# Patient Record
Sex: FEMALE | Race: WHITE | Employment: PART TIME | URBAN - METROPOLITAN AREA
[De-identification: names, ages, dates, MRNs, and addresses within clinical notes are randomized per-mention and may not be internally consistent; named-entity substitution may affect disease eponyms.]

---

## 2017-04-25 ENCOUNTER — TELEPHONE (OUTPATIENT)
Dept: PAIN MANAGEMENT | Age: 62
End: 2017-04-25

## 2017-05-24 ENCOUNTER — HOSPITAL ENCOUNTER (OUTPATIENT)
Dept: OTHER | Age: 62
Discharge: HOME | End: 2017-05-24
Attending: PHYSICAL MEDICINE & REHABILITATION | Admitting: PHYSICAL MEDICINE & REHABILITATION

## 2017-05-24 DIAGNOSIS — M47.816 SPONDYLOSIS WITHOUT MYELOPATHY OR RADICULOPATHY, LUMBAR REGION: ICD-10-CM

## 2017-07-03 ENCOUNTER — HOSPITAL ENCOUNTER (OUTPATIENT)
Dept: OTHER | Age: 62
Discharge: HOME | End: 2017-07-03
Attending: PHYSICAL MEDICINE & REHABILITATION | Admitting: PHYSICAL MEDICINE & REHABILITATION

## 2017-07-03 DIAGNOSIS — M25.561 PAIN IN BOTH KNEES, UNSPECIFIED CHRONICITY: ICD-10-CM

## 2017-07-03 DIAGNOSIS — M25.562 PAIN IN BOTH KNEES, UNSPECIFIED CHRONICITY: ICD-10-CM

## 2021-09-10 ENCOUNTER — OFFICE VISIT (OUTPATIENT)
Dept: ORTHOPEDIC SURGERY | Age: 66
End: 2021-09-10
Payer: MEDICARE

## 2021-09-10 VITALS — WEIGHT: 164 LBS | HEIGHT: 63 IN | BODY MASS INDEX: 29.06 KG/M2 | RESPIRATION RATE: 16 BRPM

## 2021-09-10 DIAGNOSIS — M67.88 RIGHT PERONEAL TENDONOSIS: Primary | ICD-10-CM

## 2021-09-10 DIAGNOSIS — M79.671 RIGHT FOOT PAIN: ICD-10-CM

## 2021-09-10 DIAGNOSIS — F17.200 CURRENT SMOKER: ICD-10-CM

## 2021-09-10 DIAGNOSIS — M25.571 RIGHT ANKLE PAIN, UNSPECIFIED CHRONICITY: ICD-10-CM

## 2021-09-10 PROCEDURE — L1902 AFO ANKLE GAUNTLET PRE OTS: HCPCS | Performed by: ORTHOPAEDIC SURGERY

## 2021-09-10 PROCEDURE — 99406 BEHAV CHNG SMOKING 3-10 MIN: CPT | Performed by: ORTHOPAEDIC SURGERY

## 2021-09-10 PROCEDURE — 99203 OFFICE O/P NEW LOW 30 MIN: CPT | Performed by: ORTHOPAEDIC SURGERY

## 2021-09-10 NOTE — PROGRESS NOTES
CHIEF COMPLAINT: Right posterior-lateral ankle pain/ peroneal tendenosis. HISTORY:  Ms. Nicki Matthews 60 y.o.  female presents today for consultation request from Chelle Jimenez MD for evaluation of right posterior-lateral ankle pain which started to worsen in June 2021 when she slipped and twisted her ankle getting into a truck. She had increased pain at the time, but it improved some. She then did yard work in August and her right foot and ankle started swelling with increased pain on the lateral ankle.  She is complaining of achy  pain. Pain is increase with standing and walking, and decrease with rest. Rates her pain a 3/10VAS, moderate achy burning and worsening. Pain is sharp with first few steps, dull achy pain by the end of the day. The pain radiates to lateral leg, and no numbness and tingling sensation. Treatment to date consisted of Medrol dose pack, Ibuprofen and Naproxen with some improvement. No other complaint. She is a smoker. She has a standing job and has not missed work, but is finding it hard to complete her day. She was told when she was in her 25s that she had a lesion in her tibia and it has been stable since. Denies tibial pain. Past Medical History:   Diagnosis Date    Arthritis     Cancer ([address]     Chronic back pain     Hyperlipidemia     Osteoarthritis        Past Surgical History:   Procedure Laterality Date    CERVIX SURGERY      HERNIA REPAIR      HYSTERECTOMY         Social History     Socioeconomic History    Marital status: Single     Spouse name: Not on file    Number of children: Not on file    Years of education: Not on file    Highest education level: Not on file   Occupational History    Occupation: cook   Tobacco Use    Smoking status: Light Tobacco Smoker     Types: Cigarettes    Smokeless tobacco: Never Used   Substance and Sexual Activity    Alcohol use:  Yes     Alcohol/week: 0.0 standard drinks    Drug use: No    Sexual activity: Never Other Topics Concern    Not on file   Social History Narrative    Not on file     Social Determinants of Health     Financial Resource Strain:     Difficulty of Paying Living Expenses:    Food Insecurity:     Worried About Running Out of Food in the Last Year:     [address] in the Last Year:    Transportation Needs:     Lack of Transportation (Medical):  Lack of Transportation (Non-Medical):    Physical Activity:     Days of Exercise per Week:     Minutes of Exercise per Session:    Stress:     Feeling of Stress :    Social Connections:     Frequency of Communication with Friends and Family:     Frequency of Social Gatherings with Friends and Family:     Attends Jain Services:     Active Member of Clubs or Organizations:     Attends Club or Organization Meetings:     Marital Status:    Intimate Partner Violence:     Fear of Current or Ex-Partner:     Emotionally Abused:     Physically Abused:     Sexually Abused:        Family History   Problem Relation Age of Onset    High Blood Pressure Mother     High Blood Pressure Father     High Blood Pressure Other        Current Outpatient Medications on File Prior to Visit   Medication Sig Dispense Refill    methylPREDNISolone (MEDROL, CARLOTTA,) 4 MG tablet Take by mouth. 1 kit 0    naproxen (NAPROSYN) 500 MG tablet Take 1 tablet by mouth 2 times daily (with meals) 60 tablet 3     No current facility-administered medications on file prior to visit. Pertinent items are noted in HPI  Review of systems reviewed from Patient History Form dated on 9/10/2021 and available in the patient's chart under the Media tab. No change noted. PHYSICAL EXAMINATION:  Ms. Dwayne Umaña is a very pleasant 60 y.o.  female who presents today in no acute distress, awake, alert, and oriented. She is well dressed, nourished and  groomed. Patient with normal affect.   Height is  5' 3\" (1.6 m), weight is 164 lb (74.4 kg), Body mass index is 29.05 kg/m². Resting respiratory rate is 16. Examination of the gait, showed that the patient walks heel-toe with a minimal limp.  Examination of both ankles showing dorsiflexion to about 10 degrees bilaterally, which increased with knee flexion. She has intact sensation and good pedal pulses.  She has weak eversion, and has moderate tenderness on deep palpation over the peroneal tendon with swelling compared to the other side.  The ankles are stable to drawer test bilaterally, equally.   Bilateral bunion. IMAGING: Mackenzie Cortes were reviewed, 3 views of the right ankle and foot taken in office today, and showed no acute fracture. There is a bony lesion in the left distal tibia. IMPRESSION: Right peroneal tendenosis. PLAN: I discussed with the patient all the treatment options, and natural history of peroneal tendenitis. We recommended stretching exercises of the calf which was taught to the patient today. She will take NSAIDS as needed, rx sent. I believe she will benefit from ankle brace, and that was applied in the office today and instructed her in care. We discussed the risk of progression. We will see her  back in 6 weeks and may consider PT if needed. The patient smokes, and we discussed with the patient the risks of smoking on general health and also on bone and soft tissue healing (delay and non-union), and promised to cut down or stop smoking. Smoking: Educated the patient regarding the hazards of smoking and that it harms their body in many ways. It increases the chance of developing heart disease, lung disease, cancer, and other health problems including poor bone and wound healing. The importance of smoking cessation for optimal bone and wound healing was stressed. This was communicated verbally, 5 Minutes. Procedures    Mark Albertsr Ankle Brace     Patient was prescribed a Swedo Ankle Brace.   The right ankle will require stabilization / immobilization from this semi-rigid / rigid orthosis to improve their function. The orthosis will assist in protecting the affected area, provide functional support and facilitate healing. Patient was instructed to progress ambulation weight bearing as tolerated in the device. The patient was educated and fit by a healthcare professional with expert knowledge and specialization in brace application while under the direct supervision of the treating physician. Verbal and written instructions for the use of and application of this item were provided. They were instructed to contact the office immediately should the brace result in increased pain, decreased sensation, increased swelling or worsening of the condition. Thank you very much for the kind consultation and allowing me to participate in this patient's care. I will continue to keep you apprised of her progress.         Fernando Deleon MD

## 2021-09-11 PROBLEM — M67.88: Status: ACTIVE | Noted: 2021-09-11

## 2021-09-11 PROBLEM — F17.200 CURRENT SMOKER: Status: ACTIVE | Noted: 2021-09-11

## 2021-09-17 ENCOUNTER — HOSPITAL ENCOUNTER (OUTPATIENT)
Dept: MRI IMAGING | Age: 66
Discharge: HOME | End: 2021-09-17
Payer: MEDICARE

## 2021-09-17 ENCOUNTER — HOSPITAL ENCOUNTER (OUTPATIENT)
Dept: WOMENS IMAGING | Age: 66
Discharge: HOME | End: 2021-09-17
Payer: MEDICARE

## 2021-09-17 DIAGNOSIS — M25.511 ACUTE PAIN OF RIGHT SHOULDER: ICD-10-CM

## 2021-09-17 DIAGNOSIS — Z12.31 ENCOUNTER FOR SCREENING MAMMOGRAM FOR BREAST CANCER: ICD-10-CM

## 2021-09-17 DIAGNOSIS — M25.571 ACUTE RIGHT ANKLE PAIN: ICD-10-CM

## 2021-09-17 DIAGNOSIS — Z12.31 OTHER SCREENING MAMMOGRAM: ICD-10-CM

## 2021-09-17 DIAGNOSIS — E78.00 HIGH CHOLESTEROL: ICD-10-CM

## 2021-09-17 PROCEDURE — 73221 MRI JOINT UPR EXTREM W/O DYE: CPT

## 2021-09-17 PROCEDURE — 77067 SCR MAMMO BI INCL CAD: CPT

## 2021-09-22 ENCOUNTER — OFFICE VISIT (OUTPATIENT)
Dept: ORTHOPEDIC SURGERY | Age: 66
End: 2021-09-22
Payer: MEDICARE

## 2021-09-22 VITALS — WEIGHT: 164 LBS | HEIGHT: 63 IN | BODY MASS INDEX: 29.06 KG/M2

## 2021-09-22 DIAGNOSIS — M25.511 ACUTE PAIN OF RIGHT SHOULDER: ICD-10-CM

## 2021-09-22 DIAGNOSIS — M19.211 SECONDARY OSTEOARTHRITIS OF RIGHT SHOULDER DUE TO ROTATOR CUFF TEAR: Primary | ICD-10-CM

## 2021-09-22 DIAGNOSIS — M75.101 SECONDARY OSTEOARTHRITIS OF RIGHT SHOULDER DUE TO ROTATOR CUFF TEAR: Primary | ICD-10-CM

## 2021-09-22 DIAGNOSIS — F17.200 CURRENT SMOKER: ICD-10-CM

## 2021-09-22 PROCEDURE — 99213 OFFICE O/P EST LOW 20 MIN: CPT | Performed by: ORTHOPAEDIC SURGERY

## 2021-09-22 PROCEDURE — 99406 BEHAV CHNG SMOKING 3-10 MIN: CPT | Performed by: ORTHOPAEDIC SURGERY

## 2021-09-22 PROCEDURE — G8419 CALC BMI OUT NRM PARAM NOF/U: HCPCS | Performed by: ORTHOPAEDIC SURGERY

## 2021-09-22 PROCEDURE — G8427 DOCREV CUR MEDS BY ELIG CLIN: HCPCS | Performed by: ORTHOPAEDIC SURGERY

## 2021-09-22 NOTE — PROGRESS NOTES
CHIEF COMPLAINT: Right shoulder pain/ cuff tendinopathy/ impingement syndrome. HISTORY:  Ms. Leola Krishna 60 y.o. female right handed presents today for consultation request from Joaquin Grider MD for evaluation of right shoulder pain which started June 2021 with no injuy. She is complaining of achy pain. Rates pain a 8/10 VAS. Pain is increase with elevation and decrease with rest. No radiation and no numbness and tingling sensation. No other complaint. No h/o trauma or gout. She has had no treatment for this problem. She works in a kitchen as a cook and . She is a smoker. She is known to me for right ankle peroneal tendinitis. Past Medical History:   Diagnosis Date    Arthritis     Cancer ([address]     Chronic back pain     Hyperlipidemia     Osteoarthritis        Past Surgical History:   Procedure Laterality Date    CERVIX SURGERY      HERNIA REPAIR      HYSTERECTOMY         Social History     Socioeconomic History    Marital status: Single     Spouse name: Not on file    Number of children: Not on file    Years of education: Not on file    Highest education level: Not on file   Occupational History    Occupation: cook   Tobacco Use    Smoking status: Light Tobacco Smoker     Types: Cigarettes    Smokeless tobacco: Never Used    Tobacco comment: Party smoker, once a week    Vaping Use    Vaping Use: Never used   Substance and Sexual Activity    Alcohol use:  Yes     Alcohol/week: 0.0 standard drinks     Comment: occansionally     Drug use: Yes     Comment: THC gummies to sleep at night not on a regular basis     Sexual activity: Never   Other Topics Concern    Not on file   Social History Narrative    Not on file     Social Determinants of Health     Financial Resource Strain:     Difficulty of Paying Living Expenses:    Food Insecurity:     Worried About Running Out of Food in the Last Year:     [address] in the Last Year:    Transportation Needs:     Lack of Transportation (Medical):  Lack of Transportation (Non-Medical):    Physical Activity:     Days of Exercise per Week:     Minutes of Exercise per Session:    Stress:     Feeling of Stress :    Social Connections:     Frequency of Communication with Friends and Family:     Frequency of Social Gatherings with Friends and Family:     Attends Temple Services:     Active Member of Clubs or Organizations:     Attends Club or Organization Meetings:     Marital Status:    Intimate Partner Violence:     Fear of Current or Ex-Partner:     Emotionally Abused:     Physically Abused:     Sexually Abused:        Family History   Problem Relation Age of Onset    High Blood Pressure Mother     High Blood Pressure Father     High Blood Pressure Other        Current Outpatient Medications on File Prior to Visit   Medication Sig Dispense Refill    methylPREDNISolone (MEDROL, CARLOTTA,) 4 MG tablet Take by mouth. (Patient not taking: Reported on 9/22/2021) 1 kit 0    naproxen (NAPROSYN) 500 MG tablet Take 1 tablet by mouth 2 times daily (with meals) (Patient not taking: Reported on 9/22/2021) 60 tablet 3     No current facility-administered medications on file prior to visit. Pertinent items are noted in HPI  Review of systems reviewed from Patient History Form dated on 9/10/2021 and available in the patient's chart under the Media tab. No change noted. PHYSICAL EXAMINATION:  Ms. Apolonia Gaytan is a very pleasant 60 y.o.  female who presents today in no acute distress, awake, alert, and oriented. She is well dressed, nourished and  groomed. Patient with normal affect. Height is  5' 3\" (1.6 m), weight is 164 lb (74.4 kg), Body mass index is 29.05 kg/m². Resting respiratory rate is 16. Examination of the gait, showed that the patient walks heel-toe with a non-antalgic gait and no limp. On evaluation of the right shoulder, range of motion is 130 degree in flexion and 120 degree in abduction.   There is positve Minutes. Thank you very much for the kind consultation and allowing me to participate in this patient's care. I will continue to keep you apprised of her progress.         Yenny Blum MD

## 2021-09-23 ENCOUNTER — TELEPHONE (OUTPATIENT)
Dept: ORTHOPEDIC SURGERY | Age: 66
End: 2021-09-23

## 2021-09-23 NOTE — TELEPHONE ENCOUNTER
Appointment Request     Patient requesting earlier appointment: Yes  Appointment offered to patient: Yes  Patient Contact Number: [phone]    Patient is requesting a sooner apt at the [facility]. She is being referred by Dr. Romina Marcial.

## 2021-09-25 PROBLEM — M75.101 SECONDARY OSTEOARTHRITIS OF RIGHT SHOULDER DUE TO ROTATOR CUFF TEAR: Status: ACTIVE | Noted: 2021-09-25

## 2021-09-25 PROBLEM — M19.211 SECONDARY OSTEOARTHRITIS OF RIGHT SHOULDER DUE TO ROTATOR CUFF TEAR: Status: ACTIVE | Noted: 2021-09-25

## 2021-10-07 ENCOUNTER — HOSPITAL ENCOUNTER (OUTPATIENT)
Dept: CT IMAGING | Age: 66
Discharge: HOME | End: 2021-10-07
Payer: MEDICARE

## 2021-10-07 DIAGNOSIS — D72.820 LYMPHOCYTOSIS: ICD-10-CM

## 2021-10-07 PROCEDURE — 74176 CT ABD & PELVIS W/O CONTRAST: CPT

## 2021-10-07 PROCEDURE — 71250 CT THORAX DX C-: CPT

## 2021-10-07 PROCEDURE — 6360000004 HC RX CONTRAST MEDICATION: Performed by: INTERNAL MEDICINE

## 2021-10-07 RX ADMIN — IOHEXOL 50 ML: 240 INJECTION, SOLUTION INTRATHECAL; INTRAVASCULAR; INTRAVENOUS; ORAL at 13:30

## 2021-10-21 ENCOUNTER — TELEPHONE (OUTPATIENT)
Dept: ORTHOPEDIC SURGERY | Age: 66
End: 2021-10-21

## 2021-10-28 ENCOUNTER — TELEPHONE (OUTPATIENT)
Dept: ORTHOPEDIC SURGERY | Age: 66
End: 2021-10-28

## 2021-10-28 NOTE — TELEPHONE ENCOUNTER
General Question     Subject: NO SHOW  Patient and /or Facility Request: PATIENT IS UPSET ABOUT HIS NO SHOW STATUS APPOINTMENT ON 10/20, STATES THAT SHE WAS IN THE OFFICE  Contact Number: [phone]

## 2021-10-29 ENCOUNTER — TELEPHONE (OUTPATIENT)
Dept: ORTHOPEDIC SURGERY | Age: 66
End: 2021-10-29

## 2021-10-29 NOTE — TELEPHONE ENCOUNTER
Auth: NPR  Date: 11/16/2021  Reference # None  Spoke with: None  Type of SX: Outpatient  Location: [facility]  CPT: 58530, 46603   DX: M25.511, M75.101, M19.211, M75.101  SX area: Rt shoulder  Insurance: Medicare A&B

## 2021-11-09 ENCOUNTER — OFFICE VISIT (OUTPATIENT)
Dept: ORTHOPEDIC SURGERY | Age: 66
End: 2021-11-09
Payer: MEDICARE

## 2021-11-09 VITALS — BODY MASS INDEX: 30.12 KG/M2 | HEIGHT: 63 IN | WEIGHT: 170 LBS

## 2021-11-09 DIAGNOSIS — M25.511 ACUTE PAIN OF RIGHT SHOULDER: Primary | ICD-10-CM

## 2021-11-09 DIAGNOSIS — M75.101 TEAR OF RIGHT SUPRASPINATUS TENDON: ICD-10-CM

## 2021-11-09 DIAGNOSIS — M75.121 COMPLETE TEAR OF RIGHT ROTATOR CUFF, UNSPECIFIED WHETHER TRAUMATIC: ICD-10-CM

## 2021-11-09 PROBLEM — C91.10 CLL (CHRONIC LYMPHOCYTIC LEUKEMIA) (HCC): Status: ACTIVE | Noted: 2021-11-09

## 2021-11-09 PROCEDURE — G8484 FLU IMMUNIZE NO ADMIN: HCPCS | Performed by: ORTHOPAEDIC SURGERY

## 2021-11-09 PROCEDURE — G8417 CALC BMI ABV UP PARAM F/U: HCPCS | Performed by: ORTHOPAEDIC SURGERY

## 2021-11-09 PROCEDURE — 1123F ACP DISCUSS/DSCN MKR DOCD: CPT | Performed by: ORTHOPAEDIC SURGERY

## 2021-11-09 PROCEDURE — G8400 PT W/DXA NO RESULTS DOC: HCPCS | Performed by: ORTHOPAEDIC SURGERY

## 2021-11-09 PROCEDURE — 4040F PNEUMOC VAC/ADMIN/RCVD: CPT | Performed by: ORTHOPAEDIC SURGERY

## 2021-11-09 PROCEDURE — G8427 DOCREV CUR MEDS BY ELIG CLIN: HCPCS | Performed by: ORTHOPAEDIC SURGERY

## 2021-11-09 PROCEDURE — 99214 OFFICE O/P EST MOD 30 MIN: CPT | Performed by: ORTHOPAEDIC SURGERY

## 2021-11-09 PROCEDURE — 3017F COLORECTAL CA SCREEN DOC REV: CPT | Performed by: ORTHOPAEDIC SURGERY

## 2021-11-09 PROCEDURE — 4004F PT TOBACCO SCREEN RCVD TLK: CPT | Performed by: ORTHOPAEDIC SURGERY

## 2021-11-09 PROCEDURE — L3670 SO ACRO/CLAV CAN WEB PRE OTS: HCPCS | Performed by: ORTHOPAEDIC SURGERY

## 2021-11-09 PROCEDURE — 1090F PRES/ABSN URINE INCON ASSESS: CPT | Performed by: ORTHOPAEDIC SURGERY

## 2021-11-09 NOTE — PROGRESS NOTES
[facility]  History and Physical  Shoulder Pain    Date:  2021    Name:  Catarina Copeland  Address:  [facility] [id number]    :  1955      Age:   73 y.o.    SSN:  [id number]      Medical Record Number:  <[id number]>    Reason for Visit:    Shoulder Pain (pre-op )      HPI:   Catarina Copeland is a 73 y.o. female who presents to our office today for follow up of the right shoulder pain. She is here for her pre op visit. She has been found to have a massive full-thickness rotator cuff tear with very little muscle atrophy. The patient reports that she is still able to move along quite well but just is not having the strength. Also reports that she has a fair amount of pain with any sort of movement especially overhead. She denies any new injuries since her last visit with us. Pain Assessment  Location of Pain: Shoulder  Severity of Pain: 6  Quality of Pain: Other (Comment)  Duration of Pain: Persistent        Review of Systems:  A 14 point review of systems available in the scanned medical record as documented by the patient on 9/10/21. The review is negative with the exception of those things mentioned in the History of Present Illness and Past Medical History. Past Medical History:  Patient's medications, allergies, past medical, surgical, social and family histories were reviewed and updated as appropriate. Allergies: Allergies   Allergen Reactions    Amoxicillin     Iodine        Physical Exam:  There were no vitals filed for this visit. General: Catarina Copeland is a healthy and well appearing 73 y.o. female who is sitting comfortably in our office in acute distress. Skin:  There are no skin lesions, cellulitis, or extreme edema. The patient has warm and well-perfused Bilateral upper extremities with brisk capillary refill.     Eyes: Extra-ocular muscles intact  Mouth: Oral mucosa moist. No perioral lesions  Pulm: Respirations unlabored and regular. Neuro: Alert and oriented x3    right Shoulder Exam:  Inspection:  No gross deformities, no signs of infection. Palpation: He has subacromial crepitus present. She does have tenderness over the rotator cuff footprint. Active Range of Motion: Forward elevation of 160 with struggle and has pain on arm descent. She also has pain at end range. , abduction of 160 with pain on arm descent, external rotation with elbow at the side 50, internal rotation to the back is T12 and with assistance she is able to go to T6    Passive Range of Motion: Similar to active. Strength: External rotation with resistance with elbow at the side -4/5, internal rotation with resistance with elbow at the side 4/5, supraspinatus testing -4/5    Special Tests:   No Taras muscle deformity. Neurovascular: Sensation to light touch is intact, no motor deficits, palpable radial pulses 2+    Additional Examinations:    Examination of the contralateral extremity does not show any tenderness, deformity or injury. Range of motion is unremarkable. There is no gross instability. There are no rashes, ulcerations or lesions. Strength and tone are normal.      Radiographic:  X-rays not obtained today. MRI right shoulder          1. Full-thickness full width supraspinatus tendon tear extending into the   anterior infraspinatus.  Approximately 4 cm retraction.  Mild fatty atrophy   of the muscle bellies. 2. Moderate acromioclavicular degenerative changes.  Significant subacromial   narrowing secondary to superior migration of the humeral head. 3. Mild glenohumeral degenerative changes with a small effusion. 4. Nonvisualization of the long head biceps tendon compatible with a tear and   distal retraction.           Assessment:  Ariadna Boudreaux is a 73 y.o. female with massive rotator cuff tear in the right shoulder. MRI shows very mild muscle atrophy. The tear is worth repairing.     Impression:  Encounter Diagnoses   Name Primary?  Complete tear of right rotator cuff, unspecified whether traumatic     Acute pain of right shoulder Yes    Tear of right supraspinatus tendon        Office Procedures:  Orders Placed This Encounter   Procedures    DJO ultrasling IV Shoulder Sling     Patient was prescribed a DJO Ultrasling IV Shoulder Brace. The right shoulder will require stabilization / immobilization from this orthosis. The orthosis will assist in protecting the affected area, provide functional support and facilitate healing. The device was ordered and fit on 11/9/21. The patient was educated and fit by a healthcare professional with expert knowledge and specialization in brace application while under the direct supervision of the treating physician. Verbal and written instructions for the use of and application of this item were provided. They were instructed to contact the office immediately should the brace result in increased pain, decreased sensation, increased swelling or worsening of the condition. Plan: Mary Alice Miguel is currently scheduled to undergo a right shoulder arthroscopy for repair of massive rotator cuff tear along with biceps tenodesis. The patient will need to be fitted with a sling today. She has already met with her family physician for surgical clearance. We discussed the surgery in complete detail along with risks and benefits along with the postoperative recovery. Risks, benefits and potential complications of arthroscopic shoulder surgery were discussed with the patient. Risks discussed include but are not limited to bleeding, infection, anesthetic risk, injury to nerves and blood vessels, deep vein thrombosis, residual stiffness and weakness, and the need for revision surgery. The patient also understands that anesthetic risks include cardiopulmonary issues, drug reactions and even death.  The patient voices an understanding of the importance of physical therapy and home exercises after surgery. All questions were answered and written informed consent for surgery was obtained today. 11/9/2021  1:30 PM      Tobi De Leon PA-C  Orthopaedic Sports Medicine Physician Assistant    During this examination, I, Tobi De Leon PA-C, functioned as a scribe for Dr. Boo Pena. This dictation was performed with a verbal recognition program (DRAGON) and it was checked for errors. It is possible that there are still dictated errors within this office note. If so, please bring any errors to my attention for an addendum. All efforts were made to ensure that this office note is accurate.  ______________  I, Dr. Boo Pena, personally performed the services described in this documentation as described by Tobi De Leon PA-C in my presence, and it is both accurate and complete. Tennille Prado MD, PhD  11/9/2021

## 2021-11-12 ENCOUNTER — HOSPITAL ENCOUNTER (OUTPATIENT)
Dept: WOMENS IMAGING | Age: 66
Discharge: HOME | End: 2021-11-12
Payer: MEDICARE

## 2021-11-12 DIAGNOSIS — Z78.0 POST-MENOPAUSAL: ICD-10-CM

## 2021-11-12 PROCEDURE — 77080 DXA BONE DENSITY AXIAL: CPT

## 2021-11-12 NOTE — PROGRESS NOTES
Place patient label inside box (if no patient label, complete below)  Name:  :  MR#:   Gabriela Shirley / PROCEDURE  1. I (we), Shena Gerard (Patient Name) authorize [facility] (Provider / Raven Cosme) and/or such assistants as may be selected by him/her, to perform the following operation/procedure(s): RIGHT SHOULDER ARTHROSCOPIC, ROTATOR CUFF REPAIR WITH POSSIBLE PATCH AUGMENTATION, BICEPS TENODESIS        Note: If unable to obtain consent prior to an emergent procedure, document the emergent reason in the medical record. This procedure has been explained to my (our) satisfaction and included in the explanation was:  A) The intended benefit, nature, and extent of the procedure to be performed;  B) The significant risks involved and the probability of success;  C) Alternative procedures and methods of treatment;  D) The dangers and probable consequences of such alternatives (including no procedure or treatment); E) The expected consequences of the procedure on my future health;  F) Whether other qualified individuals would be performing important surgical tasks and/or whether  would be present to advise or support the procedure. I (we) understand that there are other risks of infection and other serious complications in the pre-operative/procedural and postoperative/procedural stages of my (our) care. I (we) have asked all of the questions which I (we) thought were important in deciding whether or not to undergo treatment or diagnosis. These questions have been answered to my (our) satisfaction. I (we) understand that no assurance can be given that the procedure will be a success, and no guarantee or warranty of success has been given to me (us).     2. It has been explained to me (us) that during the course of the operation/procedure, unforeseen conditions may be revealed that necessitate extension of the original procedure(s) or different procedure(s) than those set forth in Paragraph 1. I (we) authorize and request that the above-named physician, his/her assistants or his/her designees, perform procedures as necessary and desirable if deemed to be in my (our) best interest.     Revised 8/2/2021                                                                          Page 1 of 2           3. I acknowledge that health care personnel may be observing this procedure for the purpose of medical education or other specified purposes as may be necessary as requested and/or approved by my (our) physician. 4. I (we) consent to the disposal by the hospital Pathologist of the removed tissue, parts or organs in accordance with hospital policy. 5. I do ____ do not ____ consent to the use of a local infiltration pain blocking agent that will be used by my provider/surgical provider to help alleviate pain during my procedure. 6. I do ____ do not ____ consent to an emergent blood transfusion in the case of a life-threatening situation that requires blood components to be administered. This consent is valid for 24 hours from the beginning of the procedure. 7. This patient does ____ or does not ____ currently have a DNR status/order. If DNR order is in place, obtain Addendum to the Surgical Consent for ALL Patients with a DNR Order to address ruslan-operative status for limited intervention or DNR suspension.      8. I have read and fully understand the above Consent for Operation/Procedure and that all blanks were completed before I signed the consent.   _____________________________       _____________________      ____/____am/pm  Signature of Patient or legal representative      Printed Name / Relationship            Date / Time   ____________________________       _____________________      ____/____am/pm  Witness to Signature                                    Printed Name                    Date / Time     If patient is unable to sign or is a minor,

## 2021-11-15 ENCOUNTER — ANESTHESIA EVENT (OUTPATIENT)
Dept: OPERATING ROOM | Age: 66
End: 2021-11-15
Payer: MEDICARE

## 2021-11-16 ENCOUNTER — ANESTHESIA (OUTPATIENT)
Dept: OPERATING ROOM | Age: 66
End: 2021-11-16
Payer: MEDICARE

## 2021-11-16 ENCOUNTER — HOSPITAL ENCOUNTER (OUTPATIENT)
Age: 66
Setting detail: OUTPATIENT SURGERY
Discharge: HOME | End: 2021-11-16
Attending: ORTHOPAEDIC SURGERY | Admitting: ORTHOPAEDIC SURGERY
Payer: MEDICARE

## 2021-11-16 VITALS
TEMPERATURE: 97.1 F | DIASTOLIC BLOOD PRESSURE: 74 MMHG | WEIGHT: 165 LBS | BODY MASS INDEX: 29.23 KG/M2 | SYSTOLIC BLOOD PRESSURE: 127 MMHG | HEIGHT: 63 IN | OXYGEN SATURATION: 94 % | RESPIRATION RATE: 16 BRPM | HEART RATE: 72 BPM

## 2021-11-16 VITALS — OXYGEN SATURATION: 90 % | TEMPERATURE: 93.2 F | DIASTOLIC BLOOD PRESSURE: 83 MMHG | SYSTOLIC BLOOD PRESSURE: 130 MMHG

## 2021-11-16 DIAGNOSIS — Z98.890 S/P ARTHROSCOPY OF SHOULDER: Primary | ICD-10-CM

## 2021-11-16 LAB
ABO/RH: NORMAL
ANTIBODY SCREEN: NORMAL
HCT VFR BLD CALC: 38.2 % (ref 36–48)
HEMOGLOBIN: 12.6 G/DL (ref 12–16)
MCH RBC QN AUTO: 32.3 PG (ref 26–34)
MCHC RBC AUTO-ENTMCNC: 33 G/DL (ref 31–36)
MCV RBC AUTO: 97.8 FL (ref 80–100)
PDW BLD-RTO: 13.8 % (ref 12.4–15.4)
PLATELET # BLD: 286 K/UL (ref 135–450)
PMV BLD AUTO: 8.3 FL (ref 5–10.5)
RBC # BLD: 3.91 M/UL (ref 4–5.2)
WBC # BLD: 29.9 K/UL (ref 4–11)

## 2021-11-16 PROCEDURE — 7100000010 HC PHASE II RECOVERY - FIRST 15 MIN: Performed by: ORTHOPAEDIC SURGERY

## 2021-11-16 PROCEDURE — 2709999900 HC NON-CHARGEABLE SUPPLY: Performed by: ORTHOPAEDIC SURGERY

## 2021-11-16 PROCEDURE — 2500000003 HC RX 250 WO HCPCS: Performed by: NURSE ANESTHETIST, CERTIFIED REGISTERED

## 2021-11-16 PROCEDURE — 85027 COMPLETE CBC AUTOMATED: CPT

## 2021-11-16 PROCEDURE — 86850 RBC ANTIBODY SCREEN: CPT

## 2021-11-16 PROCEDURE — 64415 NJX AA&/STRD BRCH PLXS IMG: CPT | Performed by: ANESTHESIOLOGY

## 2021-11-16 PROCEDURE — 2720000010 HC SURG SUPPLY STERILE: Performed by: ORTHOPAEDIC SURGERY

## 2021-11-16 PROCEDURE — 2500000003 HC RX 250 WO HCPCS: Performed by: ANESTHESIOLOGY

## 2021-11-16 PROCEDURE — C1763 CONN TISS, NON-HUMAN: HCPCS | Performed by: ORTHOPAEDIC SURGERY

## 2021-11-16 PROCEDURE — 2580000003 HC RX 258: Performed by: ORTHOPAEDIC SURGERY

## 2021-11-16 PROCEDURE — 3700000000 HC ANESTHESIA ATTENDED CARE: Performed by: ORTHOPAEDIC SURGERY

## 2021-11-16 PROCEDURE — 3700000001 HC ADD 15 MINUTES (ANESTHESIA): Performed by: ORTHOPAEDIC SURGERY

## 2021-11-16 PROCEDURE — 7100000000 HC PACU RECOVERY - FIRST 15 MIN: Performed by: ORTHOPAEDIC SURGERY

## 2021-11-16 PROCEDURE — 2580000003 HC RX 258: Performed by: NURSE ANESTHETIST, CERTIFIED REGISTERED

## 2021-11-16 PROCEDURE — 7100000011 HC PHASE II RECOVERY - ADDTL 15 MIN: Performed by: ORTHOPAEDIC SURGERY

## 2021-11-16 PROCEDURE — 6360000002 HC RX W HCPCS: Performed by: ANESTHESIOLOGY

## 2021-11-16 PROCEDURE — 3600000004 HC SURGERY LEVEL 4 BASE: Performed by: ORTHOPAEDIC SURGERY

## 2021-11-16 PROCEDURE — 3600000014 HC SURGERY LEVEL 4 ADDTL 15MIN: Performed by: ORTHOPAEDIC SURGERY

## 2021-11-16 PROCEDURE — C1713 ANCHOR/SCREW BN/BN,TIS/BN: HCPCS | Performed by: ORTHOPAEDIC SURGERY

## 2021-11-16 PROCEDURE — 86900 BLOOD TYPING SEROLOGIC ABO: CPT

## 2021-11-16 PROCEDURE — 2500000003 HC RX 250 WO HCPCS: Performed by: ORTHOPAEDIC SURGERY

## 2021-11-16 PROCEDURE — 86901 BLOOD TYPING SEROLOGIC RH(D): CPT

## 2021-11-16 PROCEDURE — 6360000002 HC RX W HCPCS: Performed by: NURSE ANESTHETIST, CERTIFIED REGISTERED

## 2021-11-16 PROCEDURE — 6360000002 HC RX W HCPCS: Performed by: ORTHOPAEDIC SURGERY

## 2021-11-16 PROCEDURE — 2580000003 HC RX 258: Performed by: ANESTHESIOLOGY

## 2021-11-16 PROCEDURE — C9290 INJ, BUPIVACAINE LIPOSOME: HCPCS | Performed by: ANESTHESIOLOGY

## 2021-11-16 PROCEDURE — 7100000001 HC PACU RECOVERY - ADDTL 15 MIN: Performed by: ORTHOPAEDIC SURGERY

## 2021-11-16 DEVICE — IMPLANTABLE DEVICE
Type: IMPLANTABLE DEVICE | Site: SHOULDER | Status: FUNCTIONAL
Brand: BIOINDUCTIVE IMPLANT WITH ARTHROSCOPIC DELIVERY SYSTEM - LARGE

## 2021-11-16 DEVICE — ANCHOR SUT L14.7MM DIA5.5MM BIOCOMPOSITE W/ 3 SZ 2: Type: IMPLANTABLE DEVICE | Site: SHOULDER | Status: FUNCTIONAL

## 2021-11-16 DEVICE — ANCHOR TEND 8 FOR REGENETEN BIOINDUCTIVE IMPL SYS: Type: IMPLANTABLE DEVICE | Site: SHOULDER | Status: FUNCTIONAL

## 2021-11-16 DEVICE — BONE ANCHORS 3 WITH ARTHROSCOPIC DELIVERY SYSTEM ADVANCED
Type: IMPLANTABLE DEVICE | Site: SHOULDER | Status: FUNCTIONAL
Brand: BONE ANCHORS WITH ARTHROSCOPIC DELIVERY SYSTEM - ADVANCED

## 2021-11-16 RX ORDER — PROPOFOL 10 MG/ML
INJECTION, EMULSION INTRAVENOUS PRN
Status: DISCONTINUED | OUTPATIENT
Start: 2021-11-16 | End: 2021-11-16

## 2021-11-16 RX ORDER — BUPIVACAINE HYDROCHLORIDE 5 MG/ML
30 INJECTION, SOLUTION EPIDURAL; INTRACAUDAL ONCE
Status: DISCONTINUED | OUTPATIENT
Start: 2021-11-16 | End: 2021-11-16 | Stop reason: HOSPADM

## 2021-11-16 RX ORDER — HYDRALAZINE HYDROCHLORIDE 20 MG/ML
5 INJECTION INTRAMUSCULAR; INTRAVENOUS EVERY 10 MIN PRN
Status: DISCONTINUED | OUTPATIENT
Start: 2021-11-16 | End: 2021-11-16 | Stop reason: HOSPADM

## 2021-11-16 RX ORDER — ONDANSETRON 2 MG/ML
4 INJECTION INTRAMUSCULAR; INTRAVENOUS
Status: DISCONTINUED | OUTPATIENT
Start: 2021-11-16 | End: 2021-11-16 | Stop reason: HOSPADM

## 2021-11-16 RX ORDER — ASPIRIN 325 MG
325 TABLET, DELAYED RELEASE (ENTERIC COATED) ORAL 2 TIMES DAILY
Qty: 28 TABLET | Refills: 0 | Status: SHIPPED | OUTPATIENT
Start: 2021-11-16 | End: 2022-01-06

## 2021-11-16 RX ORDER — DIPHENHYDRAMINE HYDROCHLORIDE 50 MG/ML
12.5 INJECTION INTRAMUSCULAR; INTRAVENOUS
Status: DISCONTINUED | OUTPATIENT
Start: 2021-11-16 | End: 2021-11-16 | Stop reason: HOSPADM

## 2021-11-16 RX ORDER — FENTANYL CITRATE 50 UG/ML
25 INJECTION, SOLUTION INTRAMUSCULAR; INTRAVENOUS EVERY 5 MIN PRN
Status: DISCONTINUED | OUTPATIENT
Start: 2021-11-16 | End: 2021-11-16 | Stop reason: HOSPADM

## 2021-11-16 RX ORDER — ONDANSETRON 4 MG/1
4 TABLET, FILM COATED ORAL 3 TIMES DAILY PRN
Qty: 15 TABLET | Refills: 0 | Status: SHIPPED | OUTPATIENT
Start: 2021-11-16 | End: 2021-11-23

## 2021-11-16 RX ORDER — SODIUM CHLORIDE, SODIUM LACTATE, POTASSIUM CHLORIDE, CALCIUM CHLORIDE 600; 310; 30; 20 MG/100ML; MG/100ML; MG/100ML; MG/100ML
INJECTION, SOLUTION INTRAVENOUS CONTINUOUS PRN
Status: DISCONTINUED | OUTPATIENT
Start: 2021-11-16 | End: 2021-11-16 | Stop reason: SDUPTHER

## 2021-11-16 RX ORDER — SENNA PLUS 8.6 MG/1
1 TABLET ORAL 2 TIMES DAILY PRN
Qty: 10 TABLET | Refills: 0 | Status: SHIPPED | OUTPATIENT
Start: 2021-11-16 | End: 2021-11-21

## 2021-11-16 RX ORDER — VANCOMYCIN HYDROCHLORIDE 1 G/20ML
INJECTION, POWDER, LYOPHILIZED, FOR SOLUTION INTRAVENOUS PRN
Status: DISCONTINUED | OUTPATIENT
Start: 2021-11-16 | End: 2021-11-16 | Stop reason: SDUPTHER

## 2021-11-16 RX ORDER — ROCURONIUM BROMIDE 10 MG/ML
INJECTION, SOLUTION INTRAVENOUS PRN
Status: DISCONTINUED | OUTPATIENT
Start: 2021-11-16 | End: 2021-11-16 | Stop reason: SDUPTHER

## 2021-11-16 RX ORDER — OXYCODONE HYDROCHLORIDE AND ACETAMINOPHEN 5; 325 MG/1; MG/1
2 TABLET ORAL PRN
Status: DISCONTINUED | OUTPATIENT
Start: 2021-11-16 | End: 2021-11-16 | Stop reason: HOSPADM

## 2021-11-16 RX ORDER — MIDAZOLAM HYDROCHLORIDE 1 MG/ML
2 INJECTION INTRAMUSCULAR; INTRAVENOUS ONCE
Status: COMPLETED | OUTPATIENT
Start: 2021-11-16 | End: 2021-11-16

## 2021-11-16 RX ORDER — ESMOLOL HYDROCHLORIDE 10 MG/ML
INJECTION INTRAVENOUS PRN
Status: DISCONTINUED | OUTPATIENT
Start: 2021-11-16 | End: 2021-11-16 | Stop reason: SDUPTHER

## 2021-11-16 RX ORDER — CLINDAMYCIN PHOSPHATE 900 MG/50ML
900 INJECTION INTRAVENOUS ONCE
Status: COMPLETED | OUTPATIENT
Start: 2021-11-16 | End: 2021-11-16

## 2021-11-16 RX ORDER — LABETALOL HYDROCHLORIDE 5 MG/ML
5 INJECTION, SOLUTION INTRAVENOUS EVERY 10 MIN PRN
Status: DISCONTINUED | OUTPATIENT
Start: 2021-11-16 | End: 2021-11-16 | Stop reason: HOSPADM

## 2021-11-16 RX ORDER — LIDOCAINE HYDROCHLORIDE 20 MG/ML
INJECTION, SOLUTION INTRAVENOUS PRN
Status: DISCONTINUED | OUTPATIENT
Start: 2021-11-16 | End: 2021-11-16 | Stop reason: SDUPTHER

## 2021-11-16 RX ORDER — BUPIVACAINE HYDROCHLORIDE 5 MG/ML
INJECTION, SOLUTION EPIDURAL; INTRACAUDAL
Status: COMPLETED | OUTPATIENT
Start: 2021-11-16 | End: 2021-11-16

## 2021-11-16 RX ORDER — OXYCODONE HYDROCHLORIDE AND ACETAMINOPHEN 5; 325 MG/1; MG/1
1 TABLET ORAL EVERY 6 HOURS PRN
Qty: 35 TABLET | Refills: 0 | Status: SHIPPED | OUTPATIENT
Start: 2021-11-16 | End: 2021-11-23

## 2021-11-16 RX ORDER — DOXYCYCLINE HYCLATE 100 MG
100 TABLET ORAL 2 TIMES DAILY
Qty: 6 TABLET | Refills: 0 | Status: SHIPPED | OUTPATIENT
Start: 2021-11-16 | End: 2021-11-19

## 2021-11-16 RX ORDER — GLYCOPYRROLATE 1 MG/5 ML
SYRINGE (ML) INTRAVENOUS PRN
Status: DISCONTINUED | OUTPATIENT
Start: 2021-11-16 | End: 2021-11-16 | Stop reason: SDUPTHER

## 2021-11-16 RX ORDER — PROPOFOL 10 MG/ML
INJECTION, EMULSION INTRAVENOUS PRN
Status: DISCONTINUED | OUTPATIENT
Start: 2021-11-16 | End: 2021-11-16 | Stop reason: SDUPTHER

## 2021-11-16 RX ORDER — SODIUM CHLORIDE, SODIUM LACTATE, POTASSIUM CHLORIDE, CALCIUM CHLORIDE 600; 310; 30; 20 MG/100ML; MG/100ML; MG/100ML; MG/100ML
INJECTION, SOLUTION INTRAVENOUS CONTINUOUS
Status: DISCONTINUED | OUTPATIENT
Start: 2021-11-16 | End: 2021-11-16 | Stop reason: HOSPADM

## 2021-11-16 RX ORDER — OXYCODONE HYDROCHLORIDE AND ACETAMINOPHEN 5; 325 MG/1; MG/1
1 TABLET ORAL PRN
Status: DISCONTINUED | OUTPATIENT
Start: 2021-11-16 | End: 2021-11-16 | Stop reason: HOSPADM

## 2021-11-16 RX ORDER — FENTANYL CITRATE 50 UG/ML
100 INJECTION, SOLUTION INTRAMUSCULAR; INTRAVENOUS ONCE
Status: COMPLETED | OUTPATIENT
Start: 2021-11-16 | End: 2021-11-16

## 2021-11-16 RX ORDER — MEPERIDINE HYDROCHLORIDE 25 MG/ML
12.5 INJECTION INTRAMUSCULAR; INTRAVENOUS; SUBCUTANEOUS EVERY 5 MIN PRN
Status: DISCONTINUED | OUTPATIENT
Start: 2021-11-16 | End: 2021-11-16 | Stop reason: HOSPADM

## 2021-11-16 RX ORDER — ONDANSETRON 2 MG/ML
INJECTION INTRAMUSCULAR; INTRAVENOUS PRN
Status: DISCONTINUED | OUTPATIENT
Start: 2021-11-16 | End: 2021-11-16 | Stop reason: SDUPTHER

## 2021-11-16 RX ORDER — LORAZEPAM 2 MG/ML
0.5 INJECTION INTRAMUSCULAR
Status: COMPLETED | OUTPATIENT
Start: 2021-11-16 | End: 2021-11-16

## 2021-11-16 RX ORDER — DEXAMETHASONE SODIUM PHOSPHATE 4 MG/ML
INJECTION, SOLUTION INTRA-ARTICULAR; INTRALESIONAL; INTRAMUSCULAR; INTRAVENOUS; SOFT TISSUE PRN
Status: DISCONTINUED | OUTPATIENT
Start: 2021-11-16 | End: 2021-11-16 | Stop reason: SDUPTHER

## 2021-11-16 RX ORDER — DOCUSATE SODIUM 100 MG/1
100 CAPSULE, LIQUID FILLED ORAL 2 TIMES DAILY PRN
Qty: 10 CAPSULE | Refills: 0 | Status: SHIPPED | OUTPATIENT
Start: 2021-11-16 | End: 2021-11-21

## 2021-11-16 RX ORDER — FENTANYL CITRATE 50 UG/ML
50 INJECTION, SOLUTION INTRAMUSCULAR; INTRAVENOUS EVERY 5 MIN PRN
Status: DISCONTINUED | OUTPATIENT
Start: 2021-11-16 | End: 2021-11-16 | Stop reason: HOSPADM

## 2021-11-16 RX ORDER — PROCHLORPERAZINE EDISYLATE 5 MG/ML
5 INJECTION INTRAMUSCULAR; INTRAVENOUS
Status: DISCONTINUED | OUTPATIENT
Start: 2021-11-16 | End: 2021-11-16 | Stop reason: HOSPADM

## 2021-11-16 RX ADMIN — VANCOMYCIN HYDROCHLORIDE 1250 MG: 1 INJECTION, POWDER, LYOPHILIZED, FOR SOLUTION INTRAVENOUS at 13:01

## 2021-11-16 RX ADMIN — DEXAMETHASONE SODIUM PHOSPHATE 8 MG: 4 INJECTION, SOLUTION INTRAMUSCULAR; INTRAVENOUS at 13:09

## 2021-11-16 RX ADMIN — PHENYLEPHRINE HYDROCHLORIDE 100 MCG: 10 INJECTION, SOLUTION INTRAMUSCULAR; INTRAVENOUS; SUBCUTANEOUS at 13:33

## 2021-11-16 RX ADMIN — PHENYLEPHRINE HYDROCHLORIDE 50 MCG: 10 INJECTION, SOLUTION INTRAMUSCULAR; INTRAVENOUS; SUBCUTANEOUS at 13:59

## 2021-11-16 RX ADMIN — ROCURONIUM BROMIDE 100 MG: 10 INJECTION INTRAVENOUS at 13:09

## 2021-11-16 RX ADMIN — LIDOCAINE HYDROCHLORIDE 100 MG: 20 INJECTION, SOLUTION INTRAVENOUS at 13:09

## 2021-11-16 RX ADMIN — PROPOFOL 200 MG: 10 INJECTION, EMULSION INTRAVENOUS at 13:09

## 2021-11-16 RX ADMIN — FENTANYL CITRATE 100 MCG: 50 INJECTION INTRAMUSCULAR; INTRAVENOUS at 11:57

## 2021-11-16 RX ADMIN — ONDANSETRON 4 MG: 2 INJECTION INTRAMUSCULAR; INTRAVENOUS at 13:09

## 2021-11-16 RX ADMIN — BUPIVACAINE 10 ML: 13.3 INJECTION, SUSPENSION, LIPOSOMAL INFILTRATION at 12:07

## 2021-11-16 RX ADMIN — FENTANYL CITRATE 50 MCG: 50 INJECTION INTRAMUSCULAR; INTRAVENOUS at 14:59

## 2021-11-16 RX ADMIN — PHENYLEPHRINE HYDROCHLORIDE 200 MCG: 10 INJECTION, SOLUTION INTRAMUSCULAR; INTRAVENOUS; SUBCUTANEOUS at 14:01

## 2021-11-16 RX ADMIN — PHENYLEPHRINE HYDROCHLORIDE 100 MCG: 10 INJECTION, SOLUTION INTRAMUSCULAR; INTRAVENOUS; SUBCUTANEOUS at 14:06

## 2021-11-16 RX ADMIN — FENTANYL CITRATE 100 MCG: 50 INJECTION INTRAMUSCULAR; INTRAVENOUS at 13:56

## 2021-11-16 RX ADMIN — CLINDAMYCIN PHOSPHATE 900 MG: 900 INJECTION, SOLUTION INTRAVENOUS at 13:16

## 2021-11-16 RX ADMIN — SODIUM CHLORIDE, SODIUM LACTATE, POTASSIUM CHLORIDE, AND CALCIUM CHLORIDE: .6; .31; .03; .02 INJECTION, SOLUTION INTRAVENOUS at 14:40

## 2021-11-16 RX ADMIN — SODIUM CHLORIDE, POTASSIUM CHLORIDE, SODIUM LACTATE AND CALCIUM CHLORIDE: 600; 310; 30; 20 INJECTION, SOLUTION INTRAVENOUS at 11:30

## 2021-11-16 RX ADMIN — LORAZEPAM 0.5 MG: 2 INJECTION INTRAMUSCULAR; INTRAVENOUS at 16:52

## 2021-11-16 RX ADMIN — FENTANYL CITRATE 50 MCG: 50 INJECTION INTRAMUSCULAR; INTRAVENOUS at 15:01

## 2021-11-16 RX ADMIN — SUGAMMADEX 200 MG: 100 INJECTION, SOLUTION INTRAVENOUS at 15:20

## 2021-11-16 RX ADMIN — Medication 0.2 MG: at 14:05

## 2021-11-16 RX ADMIN — SODIUM CHLORIDE, SODIUM LACTATE, POTASSIUM CHLORIDE, AND CALCIUM CHLORIDE: .6; .31; .03; .02 INJECTION, SOLUTION INTRAVENOUS at 13:01

## 2021-11-16 RX ADMIN — ESMOLOL HYDROCHLORIDE 40 MG: 10 INJECTION, SOLUTION INTRAVENOUS at 14:53

## 2021-11-16 RX ADMIN — PHENYLEPHRINE HYDROCHLORIDE 100 MCG: 10 INJECTION, SOLUTION INTRAMUSCULAR; INTRAVENOUS; SUBCUTANEOUS at 14:14

## 2021-11-16 RX ADMIN — ROCURONIUM BROMIDE 20 MG: 10 INJECTION INTRAVENOUS at 14:51

## 2021-11-16 RX ADMIN — MIDAZOLAM HYDROCHLORIDE 2 MG: 2 INJECTION, SOLUTION INTRAMUSCULAR; INTRAVENOUS at 11:57

## 2021-11-16 RX ADMIN — BUPIVACAINE HYDROCHLORIDE 15 ML: 5 INJECTION, SOLUTION EPIDURAL; INTRACAUDAL; PERINEURAL at 12:07

## 2021-11-16 ASSESSMENT — PULMONARY FUNCTION TESTS
PIF_VALUE: 25
PIF_VALUE: 25
PIF_VALUE: 23
PIF_VALUE: 26
PIF_VALUE: 24
PIF_VALUE: 22
PIF_VALUE: 1
PIF_VALUE: 22
PIF_VALUE: 24
PIF_VALUE: 26
PIF_VALUE: 17
PIF_VALUE: 25
PIF_VALUE: 24
PIF_VALUE: 27
PIF_VALUE: 1
PIF_VALUE: 27
PIF_VALUE: 26
PIF_VALUE: 11
PIF_VALUE: 26
PIF_VALUE: 26
PIF_VALUE: 3
PIF_VALUE: 24
PIF_VALUE: 23
PIF_VALUE: 22
PIF_VALUE: 25
PIF_VALUE: 22
PIF_VALUE: 24
PIF_VALUE: 27
PIF_VALUE: 0
PIF_VALUE: 26
PIF_VALUE: 24
PIF_VALUE: 25
PIF_VALUE: 26
PIF_VALUE: 25
PIF_VALUE: 26
PIF_VALUE: 26
PIF_VALUE: 22
PIF_VALUE: 23
PIF_VALUE: 26
PIF_VALUE: 16
PIF_VALUE: 22
PIF_VALUE: 25
PIF_VALUE: 25
PIF_VALUE: 26
PIF_VALUE: 21
PIF_VALUE: 26
PIF_VALUE: 26
PIF_VALUE: 24
PIF_VALUE: 25
PIF_VALUE: 26
PIF_VALUE: 23
PIF_VALUE: 26
PIF_VALUE: 25
PIF_VALUE: 24
PIF_VALUE: 24
PIF_VALUE: 0
PIF_VALUE: 26
PIF_VALUE: 25
PIF_VALUE: 22
PIF_VALUE: 23
PIF_VALUE: 25
PIF_VALUE: 25
PIF_VALUE: 27
PIF_VALUE: 24
PIF_VALUE: 25
PIF_VALUE: 25
PIF_VALUE: 2
PIF_VALUE: 27
PIF_VALUE: 26
PIF_VALUE: 23
PIF_VALUE: 26
PIF_VALUE: 23
PIF_VALUE: 26
PIF_VALUE: 0
PIF_VALUE: 25
PIF_VALUE: 26
PIF_VALUE: 35
PIF_VALUE: 25
PIF_VALUE: 25
PIF_VALUE: 26
PIF_VALUE: 22
PIF_VALUE: 26
PIF_VALUE: 3
PIF_VALUE: 26
PIF_VALUE: 23
PIF_VALUE: 28
PIF_VALUE: 25
PIF_VALUE: 22
PIF_VALUE: 25
PIF_VALUE: 24
PIF_VALUE: 22
PIF_VALUE: 0
PIF_VALUE: 22
PIF_VALUE: 25
PIF_VALUE: 26
PIF_VALUE: 23
PIF_VALUE: 25
PIF_VALUE: 25
PIF_VALUE: 22
PIF_VALUE: 25
PIF_VALUE: 22
PIF_VALUE: 25
PIF_VALUE: 24
PIF_VALUE: 24
PIF_VALUE: 11
PIF_VALUE: 26
PIF_VALUE: 23
PIF_VALUE: 22
PIF_VALUE: 22
PIF_VALUE: 24
PIF_VALUE: 26
PIF_VALUE: 25
PIF_VALUE: 37
PIF_VALUE: 22
PIF_VALUE: 24
PIF_VALUE: 24
PIF_VALUE: 23
PIF_VALUE: 22
PIF_VALUE: 25
PIF_VALUE: 25
PIF_VALUE: 21
PIF_VALUE: 25
PIF_VALUE: 23
PIF_VALUE: 24
PIF_VALUE: 26
PIF_VALUE: 24
PIF_VALUE: 25
PIF_VALUE: 25
PIF_VALUE: 24
PIF_VALUE: 3
PIF_VALUE: 25
PIF_VALUE: 24
PIF_VALUE: 22
PIF_VALUE: 23
PIF_VALUE: 26
PIF_VALUE: 21
PIF_VALUE: 26
PIF_VALUE: 22
PIF_VALUE: 25
PIF_VALUE: 24
PIF_VALUE: 25
PIF_VALUE: 26
PIF_VALUE: 25
PIF_VALUE: 1
PIF_VALUE: 27
PIF_VALUE: 26

## 2021-11-16 ASSESSMENT — PAIN DESCRIPTION - DESCRIPTORS: DESCRIPTORS: ACHING;SHARP

## 2021-11-16 ASSESSMENT — PAIN SCALES - GENERAL
PAINLEVEL_OUTOF10: 0
PAINLEVEL_OUTOF10: 3
PAINLEVEL_OUTOF10: 0
PAINLEVEL_OUTOF10: 0

## 2021-11-16 ASSESSMENT — PAIN DESCRIPTION - LOCATION
LOCATION: SHOULDER

## 2021-11-16 ASSESSMENT — PAIN - FUNCTIONAL ASSESSMENT: PAIN_FUNCTIONAL_ASSESSMENT: 0-10

## 2021-11-16 ASSESSMENT — ENCOUNTER SYMPTOMS: SHORTNESS OF BREATH: 0

## 2021-11-16 ASSESSMENT — LIFESTYLE VARIABLES: SMOKING_STATUS: 1

## 2021-11-16 ASSESSMENT — PAIN DESCRIPTION - PAIN TYPE
TYPE: ACUTE PAIN;SURGICAL PAIN

## 2021-11-16 ASSESSMENT — PAIN DESCRIPTION - ORIENTATION
ORIENTATION: RIGHT

## 2021-11-16 NOTE — ANESTHESIA POSTPROCEDURE EVALUATION
Department of Anesthesiology  Postprocedure Note    Patient: Caty Dumont  MRN: [id number]  YOB: 1955  Date of evaluation: 11/16/2021  Time:  5:52 PM     Procedure Summary     Date: 11/16/21 Room / Location: [facility] [address]  / [facility]    Anesthesia Start: 1301 Anesthesia Stop: 4381    Procedure: RIGHT SHOULDER EXAM UNDER ANESTHESIA, DIAGNOSTIC ARTHROSCOPY, ARTHROSCOPIC EXTENSIVE DEBRIDEMENT, LYSIS OF ADHESIONS, ARTHROSCOPIC ROTATOR CUFF REPAIR WITH COLLAGEN PATCH AUGMENTATION, (Right Shoulder) Diagnosis:       Tear of right supraspinatus tendon      (Tear of right supraspinatus [M75.101])    Surgeons: Sarah Baum MD Responsible Provider: Suad Bull MD    Anesthesia Type: general, regional ASA Status: 3          Anesthesia Type: general, regional    Jorge Phase I: Jorge Score: 10    Jorge Phase II:      Last vitals: Reviewed and per EMR flowsheets.        Anesthesia Post Evaluation    Patient location during evaluation: PACU  Patient participation: complete - patient participated  Level of consciousness: awake and alert  Pain score: 0  Airway patency: patent  Nausea & Vomiting: no nausea and no vomiting  Cardiovascular status: blood pressure returned to baseline  Respiratory status: acceptable  Hydration status: euvolemic

## 2021-11-16 NOTE — BRIEF OP NOTE
Brief Postoperative Note      Patient: Cheryl Moe  YOB: 1955  MRN: [id number]    Date of Procedure: 11/16/2021    Pre-Op Diagnosis: Tear of right supraspinatus [M75.101]    Post-Op Diagnosis: Same       Procedure(s):  RIGHT SHOULDER EXAM UNDER ANESTHESIA, DIAGNOSTIC ARTHROSCOPY, ARTHROSCOPIC EXTENSIVE DEBRIDEMENT, LYSIS OF ADHESIONS, ARTHROSCOPIC ROTATOR CUFF REPAIR WITH COLLAGEN PATCH AUGMENTATION,    Surgeon(s):  Jenise Sprague MD    Assistant:  Surgical Assistant: Raheem Restrepo;  Angeline Ellis RN  Fellow: Donte Rolle MD    Anesthesia: General    Estimated Blood Loss (mL): 814     Complications: None    Specimens:   * No specimens in log *    Implants:  * No implants in log *      Drains: * No LDAs found *    Findings: see the op note     Electronically signed by Lianna Fish MD on 11/16/2021 at 3:12 PM

## 2021-11-16 NOTE — ANESTHESIA PROCEDURE NOTES
Peripheral Block    Patient location during procedure: pre-op  Start time: 11/16/2021 11:57 AM  End time: 11/16/2021 12:00 PM  Staffing  Performed: anesthesiologist   Anesthesiologist: Margaret Mina MD  Preanesthetic Checklist  Completed: patient identified, IV checked, site marked, risks and benefits discussed, surgical consent, monitors and equipment checked, pre-op evaluation, timeout performed, anesthesia consent given, oxygen available and patient being monitored  Peripheral Block  Patient position: sitting  Prep: ChloraPrep  Patient monitoring: cardiac monitor, continuous pulse ox, frequent blood pressure checks and IV access  Block type: Brachial plexus  Laterality: right  Injection technique: single-shot  Guidance: ultrasound guided  Local infiltration: bupivacaine  Infiltration strength: 1 %  Dose: 3 mL  Supraclavicular  Provider prep: mask and sterile gloves  Local infiltration: bupivacaine  Needle  Needle type: combined needle/nerve stimulator   Needle gauge: 21 G  Needle length: 10 cm  Needle localization: ultrasound guidance  Assessment  Injection assessment: negative aspiration for heme, no paresthesia on injection and local visualized surrounding nerve on ultrasound  Paresthesia pain: none  Slow fractionated injection: yes  Hemodynamics: stable  Additional Notes  Immediately prior to procedure a \"time out\" was called to verify the correct patient, allergies, laterality, procedure and equipment. Time out performed with kurt RN    Local Anesthetic: 0.5 %  Bupivacaine/exparel   Amount: 25 ml  in 5 ml increments after negative aspiration each time. Anterior scalene and middle scalene muscles, 1st rib and pleura, brachial plexus (upper, middle and lower trunk) and Subclavian artery are identified, the tip of the needle and the spread of the local anesthetic around the brachial plexus are visualized.  The Brachial plexus appeared to be anatomically normal and there were no abnormal pathologically findings seen.          Medications Administered  Bupivacaine (MARCAINE) PF injection 0.5%, 15 mL  bupivacaine liposome (EXPAREL) injection 1.3%, 10 mL  Reason for block: post-op pain management and at surgeon's request

## 2021-11-16 NOTE — ANESTHESIA PRE PROCEDURE
Department of Anesthesiology  Preprocedure Note       Name:  Devante Ellington   Age:  73 y.o.  :  1955                                          MRN:  [id number]         Date:  2021      Surgeon: Kip Ochoa):  Quincy Lloyd MD    Procedure: Procedure(s):  RIGHT SHOULDER ARTHROSCOPIC, ROTATOR CUFF REPAIR WITH POSSIBLE PATCH AUGMENTATION, BICEPS TENODESIS    Medications prior to admission:   Prior to Admission medications    Medication Sig Start Date End Date Taking? Authorizing Provider   atorvastatin (LIPITOR) 20 MG tablet TAKE 1 TABLET BY MOUTH DAILY 10/5/21   Louis Velasquez MD   naproxen (NAPROSYN) 500 MG tablet Take 1 tablet by mouth 2 times daily (with meals) 21   Louis Velasquez MD       Current medications:    Current Facility-Administered Medications   Medication Dose Route Frequency Provider Last Rate Last Admin    vancomycin (VANCOCIN) 1,250 mg in dextrose 5 % 250 mL IVPB  15 mg/kg IntraVENous Once Quincy Lloyd MD        lactated ringers infusion   IntraVENous Continuous Fran Dash DO           Allergies: Allergies   Allergen Reactions    Amoxicillin     Iodine        Problem List:    Patient Active Problem List   Diagnosis Code    Primary osteoarthritis of right knee M17.11    Sprain of right knee S83. 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 M75.101, M19.211    CLL (chronic lymphocytic leukemia) (HCC) C91.10       Past Medical History:        Diagnosis Date    Arthritis     Cancer ([address]     Chronic back pain     Hyperlipidemia     Osteoarthritis        Past Surgical History:        Procedure Laterality Date    CERVIX SURGERY      HERNIA REPAIR      HYSTERECTOMY         Social History:    Social History     Tobacco Use    Smoking status: Light Tobacco Smoker     Types: Cigarettes    Smokeless tobacco: Never Used    Tobacco comment: Party smoker, once a week    Substance Use Topics    Alcohol use: Yes     Alcohol/week: 0.0 standard drinks     Comment: occansionally                                 Ready to quit: Not Answered  Counseling given: Not Answered  Comment: Party smoker, once a week       Vital Signs (Current): There were no vitals filed for this visit. BP Readings from Last 3 Encounters:   11/08/21 130/70   10/05/21 130/80   09/02/21 120/70       NPO Status:                                                                                 BMI:   Wt Readings from Last 3 Encounters:   11/09/21 170 lb (77.1 kg)   11/08/21 170 lb (77.1 kg)   10/20/21 167 lb (75.8 kg)     There is no height or weight on file to calculate BMI.    CBC:   Lab Results   Component Value Date    WBC 28.7 10/04/2021    RBC 3.73 10/04/2021    HGB 12.1 10/04/2021    HCT 36.6 10/04/2021    MCV 98.1 10/04/2021    RDW 13.5 10/04/2021     10/04/2021       CMP:   Lab Results   Component Value Date     10/04/2021    K 4.2 10/04/2021     10/04/2021    CO2 20 10/04/2021    BUN 14 10/04/2021    CREATININE 0.6 10/04/2021    GFRAA >60 10/04/2021    AGRATIO 2.2 10/04/2021    LABGLOM >60 10/04/2021    GLUCOSE 100 10/04/2021    PROT 6.4 10/04/2021    CALCIUM 9.4 10/04/2021    BILITOT 0.3 10/04/2021    ALKPHOS 96 10/04/2021    AST 15 10/04/2021    ALT 13 10/04/2021       POC Tests: No results for input(s): POCGLU, POCNA, POCK, POCCL, POCBUN, POCHEMO, POCHCT in the last 72 hours.     Coags: No results found for: PROTIME, INR, APTT    HCG (If Applicable): No results found for: PREGTESTUR, PREGSERUM, HCG, HCGQUANT     ABGs: No results found for: PHART, PO2ART, IPH3WBL, NUT4DEE, BEART, Y3HHNNVL     Type & Screen (If Applicable):  No results found for: LABABO, LABRH    Drug/Infectious Status (If Applicable):  No results found for: HIV, HEPCAB    COVID-19 Screening (If Applicable): No results found for: COVID19        Anesthesia Evaluation    Airway: Mallampati: II  TM distance: >3 FB   Neck ROM: full  Mouth opening: > = 3 FB Dental:          Pulmonary:   (+) COPD:  current smoker    (-) shortness of breath                           Cardiovascular:  Exercise tolerance: good (>4 METS),       (-)  angina and  GANN                Neuro/Psych:      (-) seizures and CVA           GI/Hepatic/Renal:   (+) GERD:,           Endo/Other:    (+) blood dyscrasia::., malignancy/cancer. (-) diabetes mellitus               Abdominal:             Vascular: Other Findings:           Anesthesia Plan      general and regional     ASA 3     (-npo MN  -smoker, states she does not get sob  -CLL- new dx. States she has no trouble with bleeding   -discussed r/b of nerve block, including diaphragm weakness)  Induction: intravenous. MIPS: Postoperative opioids intended. Anesthetic plan and risks discussed with patient. Plan discussed with CRNA.                 Elizabeth Grider MD   11/16/2021

## 2021-11-16 NOTE — PROGRESS NOTES
Timeout for right interscalene block by Dr. Enrique Valle. Pt. On monitor in NSR and stable VS and O2 at 2L per N/C.  1157-Sedation meds. Per Dr. Craig Rubio completed and pt.  tolerated well. See flowsheet for VS.  Siderails up and call light in reach.

## 2021-11-16 NOTE — H&P
Jose Baez    [id number]    [facility] Same Day Surgery Update H & P  Department of General Surgery   Surgical Service   Pre-operative History and Physical  Last H & P within the last 30 days. DIAGNOSIS:   Tear of right supraspinatus tendon [M75.101]    Procedure(s):  RIGHT SHOULDER ARTHROSCOPIC, ROTATOR CUFF REPAIR WITH POSSIBLE PATCH AUGMENTATION, BICEPS TENODESIS     History obtained from: Patient interview and EHR     HISTORY OF PRESENT ILLNESS:   Patient with right shoulder pain, weakness and limited ROM in the setting of MRI demonstrated rotator cuff tear. The symptoms have been recalcitrant to conservative treatment and the patient presents today for the above procedure. Covid 19:  Patient denies fever, chills, worsening cough, or known exposure to Covid-19. Past Medical History:        Diagnosis Date    Arthritis     Cancer ([facility] [address] 11/09/2021    CLL    Chronic back pain     Hyperlipidemia     Osteoarthritis      Past Surgical History:        Procedure Laterality Date    CERVIX SURGERY      HERNIA REPAIR      HYSTERECTOMY       Past Social History:  Social History     Socioeconomic History    Marital status: Single     Spouse name: None    Number of children: None    Years of education: None    Highest education level: None   Occupational History    Occupation: [employer]   Tobacco Use    Smoking status: Light Tobacco Smoker     Types: Cigarettes    Smokeless tobacco: Never Used    Tobacco comment: Party smoker, once a week    Vaping Use    Vaping Use: Never used   Substance and Sexual Activity    Alcohol use:  Yes     Alcohol/week: 0.0 standard drinks     Comment: occansionally     Drug use: Yes     Comment: THC gummies to sleep at night not on a regular basis     Sexual activity: Never   Other Topics Concern    None   Social History Narrative    None     Social Determinants of Health     Financial Resource Strain:     Difficulty of Paying Living Expenses: Not on file   Food Insecurity:     Worried About Running Out of Food in the Last Year: Not on file    Celia of Food in the Last Year: Not on file   Transportation Needs:     Lack of Transportation (Medical): Not on file    Lack of Transportation (Non-Medical): Not on file   Physical Activity:     Days of Exercise per Week: Not on file    Minutes of Exercise per Session: Not on file   Stress:     Feeling of Stress : Not on file   Social Connections:     Frequency of Communication with Friends and Family: Not on file    Frequency of Social Gatherings with Friends and Family: Not on file    Attends Yazidi Services: Not on file    Active Member of [address] [place] [address] [email or web address] or Organizations: Not on file    Attends Club or Organization Meetings: Not on file    Marital Status: Not on file   Intimate Partner Violence:     Fear of Current or Ex-Partner: Not on file    Emotionally Abused: Not on file    Physically Abused: Not on file    Sexually Abused: Not on file   Housing Stability:     Unable to Pay for Housing in the Last Year: Not on file    Number of [place] in the Last Year: Not on file    Unstable Housing in the Last Year: Not on file         Medications Prior to Admission:      Prior to Admission medications    Medication Sig Start Date End Date Taking?  Authorizing Provider   atorvastatin (LIPITOR) 20 MG tablet TAKE 1 TABLET BY MOUTH DAILY 10/5/21  Yes Renée Erickson MD   naproxen (NAPROSYN) 500 MG tablet Take 1 tablet by mouth 2 times daily (with meals) 9/2/21   Renée Erickson MD         Allergies:  Amoxicillin and Iodine    PHYSICAL EXAM:      /85   Pulse 81   Temp 97.6 °F (36.4 °C) (Temporal)   Resp 16   Ht 5' 3\" (1.6 m)   Wt 165 lb (74.8 kg)   SpO2 94%   BMI 29.23 kg/m²      Airway:  Airway patent with no audible stridor    Heart:  Regular rate and rhythm, No murmur noted    Lungs:  No increased work of breathing, good air exchange, clear to auscultation bilaterally, no crackles or wheezing    Abdomen:  Soft, non-distended, non-tender, no masses palpated    ASSESSMENT AND PLAN    Patient is a 73 y.o. female with above specified procedure planned. 1.  The patients history and physical was obtained and signed off by the pre-admission testing department. Patient seen and focused exam done today- no new changes since last physical exam on 11/8/21      2. Access to ancillary services are available per request of the provider.     Alejos Ormond, APRN - [facility]OPE[facility]     11/16/2021

## 2021-11-16 NOTE — PROGRESS NOTES
PACU Transfer to [facility]  Pt's Current Allergies: Amoxicillin and Iodine    Pt meets criteria to transfer to next phase of care per ELSY SCORE and ASPAN standards    No results for input(s): POCGLU in the last 72 hours. Vitals:    11/16/21 1710   BP: 133/76   Pulse: 74   Resp: 18   Temp: 97.2 °F (36.2 °C)   SpO2: 90%         Intake/Output Summary (Last 24 hours) at 11/16/2021 1745  Last data filed at 11/16/2021 1710  Gross per 24 hour   Intake 1510 ml   Output 25 ml   Net 1485 ml     Sling in place on right arm. Right shoulder surgical site is covered with gauze & medipore tape. Right fingers are warm & dry. Cap refill is <3 seconds. Pain assessment:  none  Pain Level: 0    Patient was assessed for unknown alterations to skin integrity. There were no unknown alterations observed. Pt instructed on use of incentive spirometer. Patient transferred to care of Stephan Farris RN.   Family (granddaughter) updated and directed to Stephan Farris    11/16/2021 5:45 PM

## 2021-11-17 NOTE — OP NOTE
[address]                [address]                                OPERATIVE REPORT    PATIENT NAME: Shukri Barrow                         :        1955  MED REC NO:   [id number]                          ROOM:  ACCOUNT NO:   [de-identified]                           ADMIT DATE: 2021  PROVIDER:     Boo Pena MD    DATE OF PROCEDURE:  2021    PREOPERATIVE DIAGNOSES:  Right shoulder impingement, chronic rotator  cuff tear, biceps tendon tear. POSTOPERATIVE DIAGNOSES:  Right shoulder impingement, chronic rotator  cuff tear, biceps tendon tear with chronic biceps tendon tear, early  osteoarthritis, massive three-tendon rotator cuff tear with thinning of  the tendon. OPERATION PERFORMED:  Right shoulder examination under anesthesia;  diagnostic arthroscopy; arthroscopic extensive debridement of arthritis  labrum, rotator cuff, biceps stump, rotator interval subacromial bursa;  arthroscopic lysis of extensive adhesions; arthroscopic subacromial  decompression with acromioplasty; arthroscopic repair of massive  three-tendon rotator cuff tear using suture anchor repair and collagen  patch augmentation. Modifier 22 applies because the patient did have a  large tear requiring repair from separate portals of all three of the  four rotator cuff tendons, this added roughly double the work that would  take to repair an uncomplicated single tendon tear. ANESTHESIA:  General anesthesia, interscalene block. IV FLUIDS:  1000 mL of crystalloid. ESTIMATED BLOOD LOSS:  25 mL. COMPLICATIONS:  None. SURGEON:  Boo Pena MD    ASSISTANT:  Reinaldo Alcaraz MD    IMPLANTS:  Ar[employer]rex [employer] corkscrew anchor 5.5 fully threaded  anchor x5 and one large Smith and Nephew collagen patch graft. FINDINGS:  Examination under anesthesia revealed no focal motion  deficit.   Diagnostic arthroscopy revealed some early arthritis, some  labral fraying, there is a massive rotator cuff tear. Subscapularis is  involved. The comma tissue is medialized. We repaired subscapularis  supraspinatus and infraspinatus tendons using multiple sutures anchors,  a single row was employed. There was a type 2 acromion, amenable for  acromioplasty. Bone quality was fair. BRIEF HISTORY AND PRESENTING ILLNESS:  The patient is a 43-year-old  woman who presented with right shoulder pain and weakness. She had an  MRI showed large rotator cuff tear. Felt to be repairable. The patient  is a habitual smoker, was counseled extensively about smoking cessation. She was told that we would not do any revision surgery if she continues  to smoke; however, I really felt we had to do something. She was likely  going to head towards an irreparable cuff tear. She had a good chance  of healing if she complied with postoperative restrictions and cutting  back on smoking. She understood the potential risks and benefits of  surgery. She understood risks such as bleeding, infection, anesthetic  risks, injury to nerve and blood vessels, stiffness, weakness,  incomplete pain relief, and the need for further surgery. She  understood all of this and wished to proceed. She gave informed  consent. She was scheduled on an elective basis after appropriate  preoperative medical clearance. OPERATIVE PROCEDURE: On the date of the procedure, the patient was taken  to the operating room, placed on the operating room table. General  anesthesia was established. Preoperative antibiotics and interscalene  block had been given in the holding area. Under anesthesia, exam was  carried out with findings as noted above. The patient was positioned  using [employer] beach-chair position in sitting position. All pressure  points were padded. The patient's right shoulder and arm were prepped  and draped for standard arthroscopic shoulder surgery. We used [employer] for arm position. We began diagnostic arthroscopy. The  arthroscope was introduced into the glenohumeral joint through standard  posterior portal.  Systematic diagnostic arthroscopy was performed with  findings as noted above. We established an anterior portal over the  rotator interval.  We inserted our arthroscopic shaver across a metal  cannula. This was used to d'bride the structures noted above including  labrum, rotator cuff, rotator interval, glenoid and humeral cartilage. There were also some adhesions, these were meticulously released to  mobilize the rotator cuff. We released adhesions on the bursal side,  there were quite a bit of adhesions in the deltoid and the anterior  rotator cuff and also posteriorly there was thickened bursa and bursal  adhesions, these were all resected. Type 2 acromion, this was exposed  by resecting the acromion and gently teased off the coracoacromial  ligament. This exposed the type 2 acromion with osteophyte. While  viewing through the lateral portal, we used a cutting block technique,  begin to yoselyn from posterior to anterior. We smoothed out the  undersurface to a flat type 1 acromion and used the yoselyn in reverse to  polish the undersurface. We then placed the arthroscope posteriorly and  lightly debrided the rotator cuff footprint. We wanted to fix the  subscapularis first.  We established accessory anterolateral and  posterolateral portals for suture management and anchor placement. We  placed our first anchor lower than the subscapularis footprint, but  lateral.  We had good fixation with bone close to the bicipital groove  with second anchor placed above the first.  We made a total of six  sutures.   We placed a 30 mm long PassPort cannula anterolaterally to one  limb of one suture from the lower anchor and passed it antegrade using  the FastPass Scorpion suture passer, passed it through low and medial  within the subscapularis, second limb was passed just a little bit  higher up and the third suture had one limb passed above that. We now  had three simple sutures from the lower anchor and free to pass two to  three sutures from the second anchor which was a little bit more  proximal, thus we had a five simple sutures to repair the subscapularis. We brought the arm to maximal external rotation and tied the sutures  sequentially working away from low to high. We used a Revo knot  followed by alternating half hitches. A total of five half hitches or  six half hitches were used, and we cut the tail short. After tying all  five stitches, we still had one more suture from that anterior anchor. We now repositioned the PassPort cannula laterally instead of  anterolateral and started viewing posteriorly. Two additional anchors  were placed posterior and central in the footprint and also middle and  central about 7-8 mm in the articular margin. These were used to repair  the supraspinatus and infraspinatus tears. Again, these were passed in  simple fashion. We worked our way from posterior to anterior and then  our last stitch was using the remaining suture from that subscapularis  anchor. Thus, we had seven simple stitches to repair the supraspinatus  and infraspinatus complex. We tied the sutures sequentially, had very  good fixation bringing the tendon which was quite thin laterally out at  least to the middle of the footprint and without any tension. When we  brought the arm fully internally rotated, we could see there was small  dog ear and a small area of exposed footprint even further posterior  corresponding to the posterior half of the infraspinatus footprint, so  we put our fifth and final anchor and passed through the sutures  antegrade in simple stitch, tied our knots arthroscopically. Thus, we  had a total of nine sutures repairing the supraspinatus and  infraspinatus, five sutures repairing the subscapularis. We are very  happy with this construct. Laterally, the footprint was exposed, we had  already lightly abraded it. We felt that this would be excellent  indication for the Regeneten patch. We deployed the patch from lateral  and fixed it to the tendon using multiple PLLA staples and to bone using  two PEEK bone tendon staples, one anterolateral, one posterolateral and  this completed our repair. We documented our work with arthroscopic  images. We copiously irrigated the subacromial space, withdrew all  arthroscopic instruments. We closed the arthroscopic portals,  anterolateral and posterior as well as accessory anterolateral and  posterolateral portal.  These portals were closed using 4-0 Monocryl  closure and Prineo dressing. Sterile compressive dressing was applied. The arm was placed in a padded soft brace. The patient was repositioned  in the supine position before this and promptly awoken from anesthesia  having tolerated the procedure well and taken from the operating room to  the recovery room in satisfactory condition. PLAN:  The patient will be discharged on oral analgesics with  instructions to begin outpatient physical therapy and follow up in the  office in one week.         Shanti Bull MD    D: 11/16/2021 17:33:51       T: 11/16/2021 22:10:26     [email or web address]  Job#: [id number]     Doc#: [id number]    CC:

## 2021-11-19 ENCOUNTER — HOSPITAL ENCOUNTER (OUTPATIENT)
Dept: PHYSICAL THERAPY | Age: 66
Setting detail: THERAPIES SERIES
Discharge: HOME | End: 2021-11-19
Payer: MEDICARE

## 2021-11-19 PROCEDURE — 97110 THERAPEUTIC EXERCISES: CPT

## 2021-11-19 PROCEDURE — 97161 PT EVAL LOW COMPLEX 20 MIN: CPT

## 2021-11-19 NOTE — FLOWSHEET NOTE
[facility]  Outpatient Rehabilitation and Therapy, [address] [place] [address]  Phone: [phone]   Fax:     [phone]        Physical Therapy Treatment Note/ Progress Report:       Date:  2021    Patient Name:  Roger Buckley    :  1955  MRN: [id number]    Pertinent Medical History:     Medical/Treatment Diagnosis Information:  · Diagnosis: Repair massive tear with patch; delayed rehab; Right shoulder impingement, chronic rotatorcuff tear, biceps tendon tear with chronic biceps tendon tear, earlyosteoarthritis, massive three-tendon rotator cuff tear with thinning ofthe tendon. (F33.472)  · Treatment Diagnosis: Decreased ADL status    Insurance/Certification information:  PT Insurance Information: Medicare/[employer]  Physician Information:  Referring Practitioner: Dr. Jass Aiken of care signed (Y/N): N    Date of Patient follow up with Physician:      Progress Report: []  Yes  [x]  No     Date Range for reporting period:  Beginnin21  Ending:      Progress report due (10 Rx/or 30 days whichever is less):      Recertification due (POC duration/ or 90 days whichever is less):      Visit # POC/Insurance Allowable Auth Needed   1 4 []Yes    []No     Pain level:  /10     History of Injury:Patient stated she had surgery 21. Pt stated she has a lot of pain at times, but Percocet helps control pain. Pt stated she has had shoulder problems for \"a long time. \"  Pt stated she was instructed to wear her sling all the time. \"         SUBJECTIVE:  See eval   21 pt stated she was instructed to come to PT 1 session, wait 3 weeks, then return to PT for 1x/week for 3 weeks.       OBJECTIVE:    Observation:   Quick Dash: 21 49 raw; 86.4% dysfunction      Test measurements:    ROM Left Right   Shoulder Flex   NT   Shoulder Abd   NT   Shoulder ER   NT   Shoulder IR   NT                   Strength  Left Right Shoulder Flex   NT   Shoulder Scap   NT   Shoulder ER   NT   Shoulder IR   NT       RESTRICTIONS/PRECAUTIONS: follow protocol, pt has delayed protocol from Dr. Annabelle Sheridan    Exercises/Interventions:   Therapeutic Ex (29725)  Min: Resistance/Repetitions CUES/Notes                       Manual Intervention  ([id number])  Min:               NMR re-education (35746)  Min:               Therapeutic Activity (83791)  Min:               Modalities:  Min      PT offered pt CP, pt stated she will do at home           Other Therapeutic Activities:  Pt was educated on PT POC, Diagnosis, Prognosis, pathomechanics as well as frequency and duration of scheduling future physical therapy appointments. Time was also taken on this day to answer all patient questions and participation in PT. Reviewed appointment policy in detail with patient and patient verbalized understanding. Home Exercise Program:  Access Code: [facility]  URL: [email or web address]  Date: 11/19/2021  Prepared by: Jossie Still     Exercises  Seated Scapular Retraction - 3 x daily - 7 x weekly - 10 reps - 5 hold  Standing Shoulder Shrugs - 3 x daily - 7 x weekly - 10 reps - 5 hold  Isometric Shoulder Abduction at Wall - 3 x daily - 7 x weekly - 10 reps - 5 hold  Supported Elbow Flexion Extension PROM - 3 x daily - 7 x weekly - 10 reps       Therapeutic Exercise and NMR EXR  [] (73839) Provided verbal/tactile cueing for activities related to strengthening, flexibility, endurance, ROM  for improvements in scapular, scapulothoracic and UE control with self care, reaching, carrying, lifting, house/yardwork, driving/computer work.    [] (73290) Provided verbal/tactile cueing for activities related to improving balance, coordination, kinesthetic sense, posture, motor skill, proprioception  to assist with  scapular, scapulothoracic and UE control with self care, reaching, carrying, lifting, house/yardwork, driving/computer work.     Therapeutic Activities:    [] (70127 or 38200) Provided verbal/tactile cueing for activities related to improving balance, coordination, kinesthetic sense, posture, motor skill, proprioception and motor activation to allow for proper function of scapular, scapulothoracic and UE control with self care, carrying, lifting, driving/computer work.      Home Exercise Program:    [x] (11857) Reviewed/Progressed HEP activities related to strengthening, flexibility, endurance, ROM of scapular, scapulothoracic and UE control with self care, reaching, carrying, lifting, house/yardwork, driving/computer work  [] (50499) Reviewed/Progressed HEP activities related to improving balance, coordination, kinesthetic sense, posture, motor skill, proprioception of scapular, scapulothoracic and UE control with self care, reaching, carrying, lifting, house/yardwork, driving/computer work      Manual Treatments:  PROM / STM / Oscillations-Mobs:  G-I, II, III, IV (PA's, Inf., Post.)  [] (86491) Provided manual therapy to mobilize soft tissue/joints of cervical/CT, scapular GHJ and UE for the purpose of modulating pain, promoting relaxation,  increasing ROM, reducing/eliminating soft tissue swelling/inflammation/restriction, improving soft tissue extensibility and allowing for proper ROM for normal function with self care, reaching, carrying, lifting, house/yardwork, driving/computer work    Charges:  Timed Code Treatment Minutes: 20   Total Treatment Minutes: 40       [x] EVAL (LOW) 10724 (typically 20 minutes face-to-face)  [] EVAL (MOD) 55783 (typically 30 minutes face-to-face)  [] EVAL (HIGH) 41859 (typically 45 minutes face-to-face)  [] RE-EVAL     [x] DH(48418) x     [] Dry needle 1 or 2 Muscles (07903)  [] NMR (66881) x     [] Dry needle 3+ Muscles (45212)  [] Manual (51815) x     [] Ultrasound (34870) x  [] TA (66629) x     [] Mech Traction (32295)  [] ES(attended) (73410)     [] ES (un) (50525):   [] Vasopump (03074) [] Ionto (02261)   [] Other:        GOALS:  Patient stated goal: \"ability to move arm\"  []? Progressing: []? Met: []? Not Met: []? Adjusted     Therapist goals for Patient:   Short Term Goals: To be achieved in: 2 weeks  1. Independent in HEP and progression per patient tolerance, in order to prevent re-injury. []? Progressing: []? Met: []? Not Met: []? Adjusted  2. Patient will have a decrease in pain to facilitate improvement in movement, function, and ADLs as indicated by Functional Deficits. []? Progressing: []? Met: []? Not Met: []? Adjusted     Long Term Goals: To be achieved in: 12 weeks  1. Disability index score of 35% or less for the Quick DASH to assist with reaching prior level of function. []? Progressing: []? Met: []? Not Met: []? Adjusted  2. Patient will demonstrate increased AROM to 140 degrees to allow for proper joint functioning as indicated by Functional Deficits. []? Progressing: []? Met: []? Not Met: []? Adjusted  3. Patient will demonstrate an increase in NM recruitment/activation and overall GH and scapular strength to within n5lbs HHD or WNL for proper functional mobility as indicated by patients Functional Deficits. []? Progressing: []? Met: []? Not Met: []? Adjusted  4. Patient will return to house cleaning activities without increased symptoms or restriction. []? Progressing: []? Met: []? Not Met: []? Adjusted  5. Patient will be able to sleep at night without waking due to pain. []? Progressing: []? Met: []? Not Met: []? Adjusted    ASSESSMENT:  See eval    Treatment/Activity Tolerance:  [x] Patient tolerated treatment well [] Patient limited by fatique  [] Patient limited by pain  [] Patient limited by other medical complications  [] Other:     Overall Progression Towards Functional goals/ Treatment Progress Update:  [] Patient is progressing as expected towards functional goals listed. [] Progression is slowed due to complexities/Impairments listed. [] Progression has been slowed due to co-morbidities.   [x] Plan just

## 2021-11-19 NOTE — PROGRESS NOTES
[facility]  Outpatient Rehabilitation and Therapy, [address] [place]   Phone: [phone]   Fax:     [phone]                                                       Physical Therapy Certification    Dear Referring Practitioner: Dr. Sheridan Cueva,    We had the pleasure of evaluating the following patient for physical therapy services at [facility]. A summary of our findings can be found in the initial assessment below. This includes our plan of care. If you have any questions or concerns regarding these findings, please do not hesitate to contact me at the office phone number checked above. Thank you for the referral.       Physician Signature:_______________________________Date:__________________  By signing above (or electronic signature), therapists plan is approved by physician        Patient: Sara Barnes   : 1955   MRN: [id number]  Referring Physician: Referring Practitioner: Dr. Sheridan Cueva      Evaluation Date: 2021      Medical Diagnosis Information:  Diagnosis: Repair massive tear with patch; delayed rehab; Right shoulder impingement, chronic rotatorcuff tear, biceps tendon tear with chronic biceps tendon tear, earlyosteoarthritis, massive three-tendon rotator cuff tear with thinning ofthe tendon. (M75.120)   Treatment Diagnosis: Decreased ADL status                                         Insurance information:  Medicare    Precautions/ Contra-indications:  Delayed protocol  Latex Allergy:   [x]  NO      []YES  Preferred Language for Healthcare:   [x]English       []other:    C-SSRS Triggered by Intake questionnaire (Past 2 wk assessment ):   [x] No, Questionnaire did not trigger screening.   [] Yes, Patient intake triggered C-SSRS Screening      [] C-SSRS Screening completed  [] PCP notified via Epic     SUBJECTIVE: Patient stated she had surgery 21.   Pt stated she has a lot of pain at times, but Percocet helps control pain. Pt stated she has had shoulder problems for \"a long time. \"  Pt stated she was instructed to wear her sling all the time. \"      Relevant Medical History:see below  Functional Scale/Score: Quick Dash: 11/19/21 49 raw; 86.4% dysfunction    Pain Scale: 3/10-10/10 R shoulder, controlled by Percocet  Easing factors: medicine  Provocative factors: sleeping, position change, reaching, overhead activity     Type: []Constant   []Intermittent  []Radiating []Localized []other:     Numbness/Tingling: none    Occupation/School: retired     Living Status/Prior Level of Function: Independent with ADLs and IADLs    OBJECTIVE:   Posture: wore sling into clinic    Functional Mobility/Transfers: I sit to stand    Palpation: NT    Bandages/Dressings/Incisions: incisions covered with bandage; had lidocaine patch over R shoulder    Gait: (include devices/WB status): WNL     CERV ROM     Cervical Flexion     Cervical Extension     Cervical SB     Cervical rotation          ROM Left Right   Shoulder Flex  NT   Shoulder Abd  NT   Shoulder ER  NT   Shoulder IR  NT             Strength  Left Right   Shoulder Flex  NT   Shoulder Scap  NT   Shoulder ER  NT   Shoulder IR  NT                                  [x] Patient history, allergies, meds reviewed. Medical chart reviewed. See intake form. Review Of Systems (ROS):  [x]Performed Review of systems (Integumentary, CardioPulmonary, Neurological) by intake and observation. Intake form has been scanned into medical record. Patient has been instructed to contact their primary care physician regarding ROS issues if not already being addressed at this time.       Co-morbidities/Complexities (which will affect course of rehabilitation):   []None           Arthritic conditions   []Rheumatoid arthritis (M05.9)  [x]Osteoarthritis (M19.91)   Cardiovascular conditions   []Hypertension (I10)  [x]Hyperlipidemia (E78.5)  []Angina pectoris (I20)  []Atherosclerosis (I70)  []CVA Musculoskeletal conditions   []Disc pathology   []Congenital spine pathologies   []Prior surgical intervention  []Osteoporosis (M81.8)  []Osteopenia (M85.8)   Endocrine conditions   []Hypothyroid (E03.9)  []Hyperthyroid Gastrointestinal conditions   []Constipation (F92.58)   Metabolic conditions   []Morbid obesity (E66.01)  []Diabetes type 1(E10.65) or 2 (E11.65)   []Neuropathy (G60.9)     Pulmonary conditions   []Asthma (J45)  []Coughing   []COPD (J44.9)   Psychological Disorders  []Anxiety (F41.9)  []Depression (F32.9)   []Other:   [x]Other:     CA  Chronic back pain     Barriers to/and or personal factors that will affect rehab potential:              []Age  []Sex   []Smoker              []Motivation/Lack of Motivation                        []Co-Morbidities              []Cognitive Function, education/learning barriers              []Environmental, home barriers              []profession/work barriers  []past PT/medical experience  []other:  Justification:     Falls Risk Assessment (30 days):   [x] Falls Risk assessed and no intervention required.   [] Falls Risk assessed and Patient requires intervention due to being higher risk   TUG score (>12s at risk):     [] Falls education provided, including         ASSESSMENT:    Functional Impairments:     []Noted spinal or UE joint hypomobility   []Noted spinal or UE joint hypermobility   [x]Decreased spinal/UE functional ROM   []Abnormal reflexes/sensation/myotomal/dermatomal deficits   [x]Decreased RC/scapular/core strength and neuromuscular control    [x]Decreased UE functional strength   []other:      Functional Activity Limitations (from functional questionnaire and intake)   [x]Reduced ability to tolerate prolonged functional positions   [x]Reduced ability or difficulty with changes of positions or transfers between positions   [x]Reduced ability to maintain good posture and demonstrate good body mechanics with sitting, bending, and lifting   [x] Reduced ability or tolerance with driving and/or computer work   [x]Reduced ability to perform lifting, reaching, carrying tasks   [x]Reduced ability to reach behind back   [x]Reduced ability to sleep    [x]Reduced ability to tolerate any impact through UE or spine   [x]Reduced ability to  or hold objects   [x]Reduced ability to throw or toss an object   []other:    Participation Restrictions   [x]Reduced participation in self care activities   [x]Reduced participation in home management activities   []Reduced participation in work activities   [x]Reduced participation in social activities. [x]Reduced participation in sport/recreational activities. Classification/Subgrouping:   [x]signs/symptoms consistent with post-surgical status including decreased ROM, strength and function.     []signs/symptoms consistent with joint sprain/strain    []signs/symptoms consistent with shoulder impingement (internal, external, primary or secondary)   []signs/symptoms consistent with shoulder/elbow/wrist tendinopathy   []Signs/symptoms consistent with Rotator cuff tear   []sign/symptoms consistent with labral tear   []signs/symptoms consistent with rib dysfunction   []signs/symptoms consistent with postural dysfunction   []signs/symptoms consistent with Glenohumeral IR Deficit - <45 degrees   []signs/symptoms consistent with facet dysfunction of cervical/thoracic spine   []signs/symptoms consistent with pathology which may benefit from Dry Needling   []signs/symptoms which may limit the use of advanced manual therapy techniques: (Elevated CV risk profile, recent trauma, intolerance to end range positions, prior TIA, visual issues, UE neurological compromise )     Prognosis/Rehab Potential:      []Excellent   [x]Good    []Fair   []Poor    Tolerance of evaluation/treatment:    []Excellent   [x]Good    []Fair   []Poor    Physical Therapy Evaluation Complexity Justification  [x] A history of present problem with:  [] no personal factors and/or comorbidities that impact the plan of care;  [x]1-2 personal factors and/or comorbidities that impact the plan of care  []3 personal factors and/or comorbidities that impact the plan of care  [x] An examination of body systems using standardized tests and measures addressing any of the following: body structures and functions (impairments), activity limitations, and/or participation restrictions;:  [] a total of 1-2 or more elements   [] a total of 3 or more elements   [x] a total of 4 or more elements   [x] A clinical presentation with:  [x] stable and/or uncomplicated characteristics   [] evolving clinical presentation with changing characteristics  [] unstable and unpredictable characteristics;   [x] Clinical decision making of [] low, [] moderate, [] high complexity using standardized patient assessment instrument and/or measurable assessment of functional outcome. [x] EVAL (LOW) 39785 (typically 20 minutes face-to-face)  [] EVAL (MOD) 79336 (typically 30 minutes face-to-face)  [] EVAL (HIGH) 96851 (typically 45 minutes face-to-face)  [] RE-EVAL     PLAN: follow protocol  Frequency/Duration:  1 days per week for 4 Weeks, then additional PT as ordered by MD:  Interventions:  [x]  Therapeutic exercise including: strength training, ROM, for scapula, core and Upper extremity, including postural re-education. [x]  NMR activation and proprioception for UE, periscapular and RC muscles and Core, including postural re-education. [x]  Manual therapy as indicated for shoulder, scapula, spine and associated soft tissue including: Dry Needling/IASTM, STM, PROM, Gr I-IV mobilizations, manipulation. [x] Modalities as needed that may include: thermal agents, E-stim, Biofeedback, US, iontophoresis as indicated  [x] Patient education on joint protection, postural re-education, activity modification, progression of HEP.      HEP instruction:   Access Code: [facility]  URL: [email or web address]  Date: 11/19/2021  Prepared by: Ying Emmanuel    Exercises  Seated Scapular Retraction - 3 x daily - 7 x weekly - 10 reps - 5 hold  Standing Shoulder Shrugs - 3 x daily - 7 x weekly - 10 reps - 5 hold  Isometric Shoulder Abduction at Wall - 3 x daily - 7 x weekly - 10 reps - 5 hold  Supported Elbow Flexion Extension PROM - 3 x daily - 7 x weekly - 10 reps      GOALS:  Patient stated goal: \"ability to move arm\"  [] Progressing: [] Met: [] Not Met: [] Adjusted    Therapist goals for Patient:   Short Term Goals: To be achieved in: 2 weeks  1. Independent in HEP and progression per patient tolerance, in order to prevent re-injury. [] Progressing: [] Met: [] Not Met: [] Adjusted  2. Patient will have a decrease in pain to facilitate improvement in movement, function, and ADLs as indicated by Functional Deficits. [] Progressing: [] Met: [] Not Met: [] Adjusted    Long Term Goals: To be achieved in: 12 weeks  1. Disability index score of 35% or less for the Quick DASH to assist with reaching prior level of function. [] Progressing: [] Met: [] Not Met: [] Adjusted  2. Patient will demonstrate increased AROM to 140 degrees to allow for proper joint functioning as indicated by Functional Deficits. [] Progressing: [] Met: [] Not Met: [] Adjusted  3. Patient will demonstrate an increase in NM recruitment/activation and overall GH and scapular strength to within n5lbs HHD or WNL for proper functional mobility as indicated by patients Functional Deficits. [] Progressing: [] Met: [] Not Met: [] Adjusted  4. Patient will return to house cleaning activities without increased symptoms or restriction. [] Progressing: [] Met: [] Not Met: [] Adjusted  5. Patient will be able to sleep at night without waking due to pain.    [] Progressing: [] Met: [] Not Met: [] Adjusted    Electronically signed by:  Jules Matute PT      Note: If patient does not return for scheduled/recommended follow up visits, this note will serve as a discharge from care along with the most recent update on progress.

## 2021-11-23 ENCOUNTER — OFFICE VISIT (OUTPATIENT)
Dept: ORTHOPEDIC SURGERY | Age: 66
End: 2021-11-23

## 2021-11-23 VITALS — WEIGHT: 165 LBS | BODY MASS INDEX: 29.23 KG/M2 | HEIGHT: 63 IN

## 2021-11-23 DIAGNOSIS — Z98.890 S/P ROTATOR CUFF SURGERY: Primary | ICD-10-CM

## 2021-11-23 DIAGNOSIS — M75.121 COMPLETE TEAR OF RIGHT ROTATOR CUFF, UNSPECIFIED WHETHER TRAUMATIC: ICD-10-CM

## 2021-11-23 DIAGNOSIS — Z98.890 S/P ARTHROSCOPY OF RIGHT SHOULDER: Primary | ICD-10-CM

## 2021-11-23 PROCEDURE — 99024 POSTOP FOLLOW-UP VISIT: CPT | Performed by: ORTHOPAEDIC SURGERY

## 2021-11-23 RX ORDER — OXYCODONE HYDROCHLORIDE AND ACETAMINOPHEN 5; 325 MG/1; MG/1
1 TABLET ORAL EVERY 6 HOURS PRN
Qty: 30 TABLET | Refills: 0 | Status: SHIPPED | OUTPATIENT
Start: 2021-11-23 | End: 2021-11-30

## 2021-11-23 NOTE — LETTER
Shoulder Elbow Rehabilitation Referral    Patient Name: Gold Lyle      YOB: 1955    Diagnosis:   1. S/P rotator cuff surgery    2. Complete tear of right rotator cuff, unspecified whether traumatic        Precautions: RTC    Post Op Instructions:  [] Continuous passive motion (CPM)  [x] Elbow range of motion  [] Exercise in plane of scapula   []  Strengthening     [] Pulley and instruction    [x] Home exercise program (copy to patient)   [] Sling when arm at risk  [x] Sling or brace at all times   [] AAROM: Forward elevation to             [] AAROM: External rotation to     [] Isometric external rotator strengthening [] AAROM: internal rotation: up the back  [x] Isometric abductor strengthening  [] AAROM: Internal abduction     [] Isometric internal rotator strengthening [] AAROM: cross-body adduction             Stretching:     Strengthening:  [] Four quadrant (FE, ER, IR, CBA)  [] Rotator cuff (ER, IR, Abd)  [] Forward Elevation    [] External Rotators     [] External Rotation    [] Internal Rotators  [] Internal Rotation: up/back   [] Abductors     [] Internal Rotation: supine in abduction  [] Flexors  [] Cross-body abduction    [] Extensors  [x] Pendulum (FE, Abd/Add, cw/ccw)  [x] Scapular Stabilizers   [] Wall-walking (FE, Abd)    [x] Shoulder shrugs     [] Table slides      [x] Rhomboid pinch  [] Elbow (flex, ext, pron, sup)    [] Lat.  Pull downs     [] Medial epicondylitis program    [] Forward punch   [] Lateral epicondylitis program    [] Internal rotators     [] Progressive resistive exercises  [] Bench Press        [] Bench press plus  Activities:     [] Lateral pull-downs  [] Rowing     [] Progressive two-hand supine press  [] Stepper/Exercise bike   [] Biceps: curls/supination  [] Swimming  [] Water exercises    Modalities: PRN    Return to Sport:  [] Ultrasound     [] Plyometrics  [] Iontophoresis     [] Rhythmic stabilization  [] Moist heat     [] Core strengthening   [] Massage     [] Sports specific program:   [x] Cryotherapy      [] Electrical stimulation     [] Paraffin  [] Whirlpool  [] TENS    [x] Home exercise program (copy to patient). Perform exercises for:   15     minutes    2-3      times/day  [x] Supervised physical therapy  Frequency: []  1x week  [x] 2x week  [] 3x week  [] Other:   Duration: [] 2 weeks   [] 4 weeks  [x] 6 weeks  [] Other:     Additional Instructions:         Tennille Almanzar MD, PhD

## 2021-11-23 NOTE — PROGRESS NOTES
History of Present Illness: Jose Baez is a pleasant 73 y.o. female who presents for a post operative visit. She is 7 days out following a right shoulder arthroscopic debridement, rotator cuff repair with collagen patch on 11/16/21. Overall She is doing okay and feels that their pain is well controlled with current pain medications. She does say she was having difficulty breathing up until two days ago, however this has since resolved. She has been compliant with wearing the UltraSling brace at all times. She plans to do physical therapy at the [facility] office. She denies fevers, chills, numbness, tingling, and shortness of breath. Medical History:  Patient's medications, allergies, past medical, surgical, social and family histories were reviewed and updated as appropriate. Patient has had no medical changes since last evaluated      Review of Systems  A 14 point review of systems was completed by the patient on 9/10/21 and is available in the media section of the scanned medical record and was reviewed on 11/23/2021. Vital Signs: There were no vitals filed for this visit. General/Appearance: Alert and oriented and in no apparent distress. Skin:  There are no skin lesions, cellulitis, or extreme edema. The patient has warm and well-perfused Bilateral upper extremities with brisk capillary refill. Right Shoulder Exam:    Inspection: Shoulder incision portals are clean, dry and intact and well approximated. The Prineo dressing is still in place. Mild ecchymosis and swelling are present as can be expected. There is no erythema, drainage or other signs of infection    Palpation:  No crepitus to gentle motion    Active Range of Motion: Deferred    Passive Range of Motion:  Deferred    Strength:  Deferred    Special Tests:  Deferred. Neurovascular: Sensation to light touch is intact, no motor deficits, palpable radial pulses 2+    Radiology:     No new XR obtained at this time. Assessment :  Ms. Natan Gamez is a pleasant 73 y.o. patient who is 7 days out following a right shoulder arthroscopic debridement, rotator cuff repair with collagen patch on 11/16/21. She will be on a delayed rehab protocol because of the large tear size. Impression:  Encounter Diagnoses   Name Primary?  S/P rotator cuff surgery Yes    Complete tear of right rotator cuff, unspecified whether traumatic        Office Procedures:  Orders Placed This Encounter   Procedures    [facility] Physical Therapy Eugene Plummer     Referral Priority:   Routine     Referral Type:   Eval and Treat     Referral Reason:   Specialty Services Required     Requested Specialty:   Physical Therapy     Number of Visits Requested:   1       Treatment Plan:    Overall Natan Gamez is doing well. The pain is well-controlled. We recommend that She wear the UltraSling brace at all times with the exception of clothing, bathing and physical therapy. The patient was told that she is restricted from driving for at least 3 weeks postop. We will have her go ahead and begin formal physical therapy next week. We will prescribe Percocet 5mg due to continued postoperative pain. All of her questions were fully answered today. We would like to see Natan Gamez back in 2 weeks for follow-up visit. In addition, the patient was counseled with regard to the benefits of not smoking. Specifically, the adverse consequences that tobacco use has musculoskeletal healing were addressed. The patient was offered referral for resources to assist with these efforts. 11/23/2021  11:43 AM    Vinay Gurrola ATC  [employer] [address]    During this examination, I, Sreekanth Pollackhouse, functioned as a scribe for Dr. Nan Jasso. The history taking and physical examination were performed by Dr. Carmela Weir. All counseling during the appointment was performed between the patient and Dr. Carmela Weir. 11/23/2021  ______________________  I, Dr. William Castaneda, personally performed the services described in this documentation as described by Hayes Carver ATC in my presence, and it is both accurate and complete. Tennille Bird MD, PhD  11/23/2021

## 2021-11-24 ENCOUNTER — APPOINTMENT (OUTPATIENT)
Dept: PHYSICAL THERAPY | Age: 66
End: 2021-11-24
Payer: MEDICARE

## 2021-11-29 ENCOUNTER — APPOINTMENT (OUTPATIENT)
Dept: PHYSICAL THERAPY | Age: 66
End: 2021-11-29
Payer: MEDICARE

## 2021-12-01 ENCOUNTER — APPOINTMENT (OUTPATIENT)
Dept: PHYSICAL THERAPY | Age: 66
End: 2021-12-01
Payer: MEDICARE

## 2021-12-03 ENCOUNTER — HOSPITAL ENCOUNTER (OUTPATIENT)
Dept: PHYSICAL THERAPY | Age: 66
Setting detail: THERAPIES SERIES
Discharge: HOME | End: 2021-12-03
Payer: MEDICARE

## 2021-12-03 PROCEDURE — 97110 THERAPEUTIC EXERCISES: CPT

## 2021-12-03 PROCEDURE — 97140 MANUAL THERAPY 1/> REGIONS: CPT

## 2021-12-03 NOTE — FLOWSHEET NOTE
[facility]  Outpatient Rehabilitation and Therapy, [address] [address]  Phone: [phone]   Fax:     [phone]        Physical Therapy Treatment Note/ Progress Report:       Date:  12/3/2021    Patient Name:  Parvin Sheridan    :  1955  MRN: [id number]    Pertinent Medical History:     Medical/Treatment Diagnosis Information:  · Diagnosis: Repair massive tear with patch; delayed rehab; Right shoulder impingement, chronic rotatorcuff tear, biceps tendon tear with chronic biceps tendon tear, early osteoarthritis, massive three-tendon rotator cuff tear with thinning ofthe tendon. (M25.196)  · Treatment Diagnosis: Decreased ADL status    Insurance/Certification information:  PT Insurance Information: Medicare/[employer] [place]  Physician Information:  Referring Practitioner: Dr. Sherley Frank of care signed (Y/N): N    Date of Patient follow up with Physician:      Progress Report: []  Yes  [x]  No     Date Range for reporting period:  Beginnin21  Ending:      Progress report due (10 Rx/or 30 days whichever is less):      Recertification due (POC duration/ or 90 days whichever is less):      Visit # POC/Insurance Allowable Auth Needed   2 4 []Yes    []No     Pain level:  /10     History of Injury:Patient stated she had surgery 21. Pt stated she has a lot of pain at times, but Percocet helps control pain. Pt stated she has had shoulder problems for \"a long time. \"  Pt stated she was instructed to wear her sling all the time. \"         SUBJECTIVE:  See eval   21 pt stated she was instructed to come to PT 1 session, wait 3 weeks, then return to PT for 1x/week for 3 weeks. 12/3/21 Pt stated she returned to MD last week and \"he said that you look like all my patients should look a week after my surgery. \"  \"It hurts bad. \"  Pt stated she refuses to use percocet because \"it makes me sick. \"  Pt stated she is not using CP often at home. PT recommended CP 20', 4x/day. OBJECTIVE:    Observation:   Quick Dash: 11/19/21 49 raw; 86.4% dysfunction      Test measurements:    ROM Left Right   Shoulder Flex   NT   Shoulder Abd   NT   Shoulder ER   NT   Shoulder IR   NT                   Strength  Left Right   Shoulder Flex   NT   Shoulder Scap   NT   Shoulder ER   NT   Shoulder IR   NT       RESTRICTIONS/PRECAUTIONS: follow protocol, pt has delayed protocol from Dr. Lan Morris    Exercises/Interventions:   Therapeutic Ex (41902)  Min: 10 Resistance/Repetitions CUES/Notes   Shrug 10x    Rhomboid pinch 10x    Isometric ABD vs man resist Light, 10x    Isometric ABD vs wall Light, 10x    AAROM elbow flex/ext 10x    PROM elbow 10x    Manual Intervention  (39226)  Min: 27     STM R upper quarter STM upper trap, levator scap, scalenes         NMR re-education (03807)  Min:               Therapeutic Activity (49403)  Min:               Modalities:  Min     CP R shoulder 15', sitting           Other Therapeutic Activities:  Pt was educated on PT POC, Diagnosis, Prognosis, pathomechanics as well as frequency and duration of scheduling future physical therapy appointments. Time was also taken on this day to answer all patient questions and participation in PT. Reviewed appointment policy in detail with patient and patient verbalized understanding. Home Exercise Program:  Access Code: [facility]  URL: [email or web address]  Date: 11/19/2021  Prepared by: Riaz Aguilar     Exercises  Seated Scapular Retraction - 3 x daily - 7 x weekly - 10 reps - 5 hold  Standing Shoulder Shrugs - 3 x daily - 7 x weekly - 10 reps - 5 hold  Isometric Shoulder Abduction at Wall - 3 x daily - 7 x weekly - 10 reps - 5 hold  Supported Elbow Flexion Extension PROM - 3 x daily - 7 x weekly - 10 reps       Therapeutic Exercise and NMR EXR  [] (83726) Provided verbal/tactile cueing for activities related to strengthening, flexibility, endurance, ROM  for See eval    Treatment/Activity Tolerance:  [x] Patient tolerated treatment well [] Patient limited by fatique  [] Patient limited by pain  [] Patient limited by other medical complications  [] Other:     Overall Progression Towards Functional goals/ Treatment Progress Update:  [] Patient is progressing as expected towards functional goals listed. [] Progression is slowed due to complexities/Impairments listed. [] Progression has been slowed due to co-morbidities. [x] Plan just implemented, too soon to assess goals progression <30days   [] Goals require adjustment due to lack of progress  [] Patient is not progressing as expected and requires additional follow up with physician  [] Other    Prognosis for POC: [x] Good [] Fair  [] Poor    Patient requires continued skilled intervention: [x] Yes  [] No      PLAN: Follow protocol  [] Continue per plan of care [] Alter current plan (see comments)  [x] Plan of care initiated [] Hold pending MD visit [] Discharge    Electronically signed by: Libertad Burton PT     Note: If patient does not return for scheduled/recommended follow up visits, this note will serve as a discharge from care along with the most recent update on progress.

## 2021-12-06 ENCOUNTER — APPOINTMENT (OUTPATIENT)
Dept: PHYSICAL THERAPY | Age: 66
End: 2021-12-06
Payer: MEDICARE

## 2021-12-08 ENCOUNTER — APPOINTMENT (OUTPATIENT)
Dept: PHYSICAL THERAPY | Age: 66
End: 2021-12-08
Payer: MEDICARE

## 2021-12-13 ENCOUNTER — APPOINTMENT (OUTPATIENT)
Dept: PHYSICAL THERAPY | Age: 66
End: 2021-12-13
Payer: MEDICARE

## 2021-12-15 ENCOUNTER — OFFICE VISIT (OUTPATIENT)
Dept: ORTHOPEDIC SURGERY | Age: 66
End: 2021-12-15

## 2021-12-15 ENCOUNTER — APPOINTMENT (OUTPATIENT)
Dept: PHYSICAL THERAPY | Age: 66
End: 2021-12-15
Payer: MEDICARE

## 2021-12-15 VITALS — HEIGHT: 63 IN | WEIGHT: 165 LBS | BODY MASS INDEX: 29.23 KG/M2

## 2021-12-15 DIAGNOSIS — Z98.890 S/P ROTATOR CUFF SURGERY: Primary | ICD-10-CM

## 2021-12-15 PROCEDURE — 99024 POSTOP FOLLOW-UP VISIT: CPT | Performed by: PHYSICIAN ASSISTANT

## 2021-12-15 RX ORDER — HYDROCODONE BITARTRATE AND ACETAMINOPHEN 5; 325 MG/1; MG/1
1 TABLET ORAL EVERY 8 HOURS PRN
Qty: 28 TABLET | Refills: 0 | Status: SHIPPED | OUTPATIENT
Start: 2021-12-15 | End: 2021-12-22

## 2021-12-15 NOTE — LETTER
Physical Therapy Rehabilitation Referral    Patient Name:  Merritt Patel      YOB: 1955    Diagnosis:    1. S/P rotator cuff surgery        Precautions:     [x] Evaluate and Treat    Post Op Instructions:  [] Continuous passive motion (CPM) [x] Elbow ROM  [x] Exercise in plane of scapula  [x]  Strengthening     [x] Pulley and instruction   [x] Home exercise program (copy to patient)   [x] Sling when arm at risk  [] Sling or brace at all times   [x] AAROM: Forward elevation to  140            [x] AAROM: External rotation  To  40    [] Isometric external rotator strengthening [] AAROM: internal rotation: up the back  [x] Isometric abductor strengthening  [] AAROM: Internal abduction   [] Isometric internal rotator strengthening [] AAROM: cross-body adduction             Stretching:     Strengthening:  [] Four quadrant (FE, ER, IR, CBA)  [] Rotator cuff (ER, IR, Abd)  [] Forward Elevation    [] External Rotators     [] External Rotation    [] Internal Rotators  [] Internal Rotation: up/back   [] Abductors     [] Internal Rotation: supine in abduction  [] Sleeper Stretch    [] Flexors  [] Cross-body abduction    [] Extensors  [x] Pendulum (FE, Abd/Add, cw/ccw)  [x] Scapular Stabilizers   [x] Wall-walking (FE, Abd)        [x] Shoulder shrugs     [x] Table slides (FE)                [x] Rhomboid pinch  [] Elbow (flex, ext, pron, sup)        [] Lat.  Pull downs     [] Medial epicondylitis program       [] Forward punch   [] Lateral epicondylitis program       [] Internal rotators     [] Progressive resistive exercises  [] Bench Press        [] Bench press plus  Activities:     [] Lateral pull-downs  [] Rowing     [] Progressive two-hand supine press  [] Stepper/Exercise bike   [] Biceps: curls/supination  [] Swimming  [] Water exercises    Modalities:     Return to Sport:  [x] Of Choice      [] Plyometrics  [] Ultrasound     [] Rhythmic stabilization  [] Iontophoresis    [] Core strengthening   [] Moist heat     [] Sports specific program:   [] Massage         [x] Cryotherapy      [] Electrical stimulation     [] Paraffin  [] Whirlpool  [] TENS    [x] Home exercise program (copy to patient). Perform exercises for:   15     minutes    3      times/day  [x] Supervised physical therapy  Frequency: []  1x week  [x] 2x week  [] 3x week  [] Other:   Duration: [] 2 weeks   [] 4 weeks  [x] 6 weeks  [] Other:     Additional Instructions:     Tennille Walker MD, PhD

## 2021-12-17 ENCOUNTER — HOSPITAL ENCOUNTER (OUTPATIENT)
Dept: PHYSICAL THERAPY | Age: 66
Setting detail: THERAPIES SERIES
Discharge: HOME | End: 2021-12-17
Payer: MEDICARE

## 2021-12-17 PROCEDURE — 97140 MANUAL THERAPY 1/> REGIONS: CPT

## 2021-12-17 PROCEDURE — 97112 NEUROMUSCULAR REEDUCATION: CPT

## 2021-12-17 PROCEDURE — 97110 THERAPEUTIC EXERCISES: CPT

## 2021-12-17 NOTE — FLOWSHEET NOTE
[facility]  Outpatient Rehabilitation and Therapy, [address] [place] [address]  Phone: [phone]   Fax:     [phone]        Physical Therapy Treatment Note/ Progress Report:       Date:  2021    Patient Name:  Berto Rene    :  1955  MRN: [id number]    Pertinent Medical History:     Medical/Treatment Diagnosis Information:  · Diagnosis: Repair massive tear with patch; delayed rehab; Right shoulder impingement, chronic rotatorcuff tear, biceps tendon tear with chronic biceps tendon tear, early osteoarthritis, massive three-tendon rotator cuff tear with thinning ofthe tendon. (M75.120)  · Treatment Diagnosis: Decreased ADL status    Insurance/Certification information:  PT Insurance Information: Medicare/[employer]  Physician Information:  Referring Practitioner: Dr. Jessie Anderson of care signed (Y/N): N    Date of Patient follow up with Physician:      Progress Report: []  Yes  [x]  No     Date Range for reporting period:  Beginnin21  Ending:      Progress report due (10 Rx/or 30 days whichever is less):      Recertification due (POC duration/ or 90 days whichever is less):      Visit # POC/Insurance Allowable Auth Needed   3 14 []Yes    []No     Pain level:  /10     History of Injury:Patient stated she had surgery 21. Pt stated she has a lot of pain at times, but Percocet helps control pain. Pt stated she has had shoulder problems for \"a long time. \"  Pt stated she was instructed to wear her sling all the time. \"         SUBJECTIVE:  See eval   21 pt stated she was instructed to come to PT 1 session, wait 3 weeks, then return to PT for 1x/week for 3 weeks. 12/3/21 Pt stated she returned to MD last week and \"he said that you look like all my patients should look a week after my surgery. \"  \"It hurts bad. \"  Pt stated she refuses to use percocet because \"it makes me sick. \"  Pt stated she is not using CP often at home. PT recommended CP 20', 4x/day. 12/17/21 pt stated she was instructed she can stop wearing her sling unless she will be in a crowded area. OBJECTIVE:    Observation:   Quick Dash: 11/19/21 49 raw; 86.4% dysfunction      Test measurements:    ROM Right  11/19/21    Shoulder Flex NT    Shoulder Abd NT    Shoulder ER NT    Shoulder IR NT                  Strength  Right    Shoulder Flex NT    Shoulder Scap NT    Shoulder ER NT    Shoulder IR NT        RESTRICTIONS/PRECAUTIONS: follow protocol, pt has delayed protocol from Dr. Eva Estevez    Exercises/Interventions:   Therapeutic Ex (96677)  Min: 15 Resistance/Repetitions CUES/Notes   Overhead pulley 10x    Table slides flexion 10x    Pendulum 20x flexion, 30x cw, 30x ccw    Wall walking  Flexion  Scaption add              Isometric ABD vs man resist Light, 10x    Isometric ABD vs wall Light, 10x    AAROM elbow flex/ext 10x         Manual Intervention  (36512)  Min: 15     STM R upper quarter STM upper trap, levator scap, scalenes         NMR re-education (75995)  Min: 15     Roll ball on table Not yet    Shrug 10x    Rhomboid pinch 10x    Isometric ABD vs wall 10x    Marshall from floor     AAROM flexion to 140 10x    AAROM ER to 40 10x                        Therapeutic Activity (61302)  Min:               Modalities:  Min     CP R shoulder Pt elected to do at home           Other Therapeutic Activities:  Pt was educated on PT POC, Diagnosis, Prognosis, pathomechanics as well as frequency and duration of scheduling future physical therapy appointments. Time was also taken on this day to answer all patient questions and participation in PT. Reviewed appointment policy in detail with patient and patient verbalized understanding. Home Exercise Program:  Access Code: [facility]  URL: [email or web address]  Date: 11/19/2021  Prepared by: Keron Ramirez     Exercises  Seated Scapular Retraction - 3 x daily - 7 x weekly - 10 reps - 5 hold  Standing Shoulder Shrugs - 3 x daily - 7 x weekly - 10 reps - 5 hold  Isometric Shoulder Abduction at Wall - 3 x daily - 7 x weekly - 10 reps - 5 hold  Supported Elbow Flexion Extension PROM - 3 x daily - 7 x weekly - 10 reps    Access Code: [facility]  URL: [email or web address]  Date: 12/17/2021  Prepared by: Aarti Sandy    Exercises  Seated Scapular Retraction - 3 x daily - 7 x weekly - 10 reps - 5 hold  Standing Shoulder Shrugs - 3 x daily - 7 x weekly - 10 reps - 5 hold  Isometric Shoulder Abduction at Wall - 3 x daily - 7 x weekly - 10 reps - 5 hold  Supported Elbow Flexion Extension PROM - 3 x daily - 7 x weekly - 10 reps  Seated Shoulder Flexion Towel Slide at Table Top Full Range of Motion - 3 x daily - 7 x weekly - 10 reps - 5 hold  Circular Shoulder Pendulum with Table Support - 3 x daily - 7 x weekly - 10 reps  Supine Shoulder Flexion Extension AAROM with Dowel - 3 x daily - 7 x weekly - 10 reps         Therapeutic Exercise and NMR EXR  [] (38867) Provided verbal/tactile cueing for activities related to strengthening, flexibility, endurance, ROM  for improvements in scapular, scapulothoracic and UE control with self care, reaching, carrying, lifting, house/yardwork, driving/computer work.    [] (96469) Provided verbal/tactile cueing for activities related to improving balance, coordination, kinesthetic sense, posture, motor skill, proprioception  to assist with  scapular, scapulothoracic and UE control with self care, reaching, carrying, lifting, house/yardwork, driving/computer work. Therapeutic Activities:    [] (97762 or 05016) Provided verbal/tactile cueing for activities related to improving balance, coordination, kinesthetic sense, posture, motor skill, proprioception and motor activation to allow for proper function of scapular, scapulothoracic and UE control with self care, carrying, lifting, driving/computer work.      Home Exercise Program:    [x] (71753) Reviewed/Progressed HEP activities related to strengthening, flexibility, endurance, ROM of scapular, scapulothoracic and UE control with self care, reaching, carrying, lifting, house/yardwork, driving/computer work  [] (52466) Reviewed/Progressed HEP activities related to improving balance, coordination, kinesthetic sense, posture, motor skill, proprioception of scapular, scapulothoracic and UE control with self care, reaching, carrying, lifting, house/yardwork, driving/computer work      Manual Treatments:  PROM / STM / Oscillations-Mobs:  G-I, II, III, IV (PA's, Inf., Post.)  [] (56538) Provided manual therapy to mobilize soft tissue/joints of cervical/CT, scapular GHJ and UE for the purpose of modulating pain, promoting relaxation,  increasing ROM, reducing/eliminating soft tissue swelling/inflammation/restriction, improving soft tissue extensibility and allowing for proper ROM for normal function with self care, reaching, carrying, lifting, house/yardwork, driving/computer work    Charges:  Timed Code Treatment Minutes: 40   Total Treatment Minutes: 55       [] EVAL (LOW) 69032 (typically 20 minutes face-to-face)  [] EVAL (MOD) 66528 (typically 30 minutes face-to-face)  [] EVAL (HIGH) 38852 (typically 45 minutes face-to-face)  [] RE-EVAL     [x] WO(67864) x     [] Dry needle 1 or 2 Muscles (61889)  [] NMR (05080) x     [] Dry needle 3+ Muscles (71878)  [x] Manual (44552) x  2   [] Ultrasound (33536) x  [] TA (70078) x     [] Mech Traction (94295)  [] ES(attended) (40976)     [] ES (un) (41469):   [] Vasopump (79571) [] Ionto (29443)   [] Other:        GOALS:  Patient stated goal: \"ability to move arm\"  []? Progressing: []? Met: []? Not Met: []? Adjusted     Therapist goals for Patient:   Short Term Goals: To be achieved in: 2 weeks  1. Independent in HEP and progression per patient tolerance, in order to prevent re-injury. []? Progressing: []? Met: []? Not Met: []? Adjusted  2.  Patient will have a decrease in pain to facilitate improvement in movement, function, and ADLs as indicated by Functional Deficits. []? Progressing: []? Met: []? Not Met: []? Adjusted     Long Term Goals: To be achieved in: 12 weeks  1. Disability index score of 35% or less for the Quick DASH to assist with reaching prior level of function. []? Progressing: []? Met: []? Not Met: []? Adjusted  2. Patient will demonstrate increased AROM to 140 degrees to allow for proper joint functioning as indicated by Functional Deficits. []? Progressing: []? Met: []? Not Met: []? Adjusted  3. Patient will demonstrate an increase in NM recruitment/activation and overall GH and scapular strength to within n5lbs HHD or WNL for proper functional mobility as indicated by patients Functional Deficits. []? Progressing: []? Met: []? Not Met: []? Adjusted  4. Patient will return to house cleaning activities without increased symptoms or restriction. []? Progressing: []? Met: []? Not Met: []? Adjusted  5. Patient will be able to sleep at night without waking due to pain. []? Progressing: []? Met: []? Not Met: []? Adjusted    ASSESSMENT:  See eval    Treatment/Activity Tolerance:  [x] Patient tolerated treatment well [] Patient limited by fatique  [] Patient limited by pain  [] Patient limited by other medical complications  [] Other:     Overall Progression Towards Functional goals/ Treatment Progress Update:  [] Patient is progressing as expected towards functional goals listed. [] Progression is slowed due to complexities/Impairments listed. [] Progression has been slowed due to co-morbidities.   [x] Plan just implemented, too soon to assess goals progression <30days   [] Goals require adjustment due to lack of progress  [] Patient is not progressing as expected and requires additional follow up with physician  [] Other    Prognosis for POC: [x] Good [] Fair  [] Poor    Patient requires continued skilled intervention: [x] Yes  [] No      PLAN: Follow protocol  [] Continue per plan of care [] Alter current plan (see comments)  [x] Plan of care initiated [] Hold pending MD visit [] Discharge    Electronically signed by: Дмитрий Mckeon PT     Note: If patient does not return for scheduled/recommended follow up visits, this note will serve as a discharge from care along with the most recent update on progress.

## 2021-12-20 ENCOUNTER — APPOINTMENT (OUTPATIENT)
Dept: PHYSICAL THERAPY | Age: 66
End: 2021-12-20
Payer: MEDICARE

## 2021-12-22 ENCOUNTER — HOSPITAL ENCOUNTER (OUTPATIENT)
Dept: PHYSICAL THERAPY | Age: 66
Setting detail: THERAPIES SERIES
Discharge: HOME | End: 2021-12-22
Payer: MEDICARE

## 2021-12-22 PROCEDURE — 97140 MANUAL THERAPY 1/> REGIONS: CPT

## 2021-12-22 PROCEDURE — 97112 NEUROMUSCULAR REEDUCATION: CPT

## 2021-12-22 PROCEDURE — 97110 THERAPEUTIC EXERCISES: CPT

## 2021-12-22 NOTE — FLOWSHEET NOTE
[place] [facility] [place] [address] [address]  Phone: [phone]   Fax:     [phone]        Physical Therapy Treatment Note/ Progress Report:       Date:  2021    Patient Name:  Khris Rhoades   \"Lisa\" :  1955  MRN: [id number]    Pertinent Medical History:     Medical/Treatment Diagnosis Information:  · Diagnosis: Repair massive tear with patch; delayed rehab; Right shoulder impingement, chronic rotatorcuff tear, biceps tendon tear with chronic biceps tendon tear, early osteoarthritis, massive three-tendon rotator cuff tear with thinning ofthe tendon. (G08.952)  · Treatment Diagnosis: Decreased ADL status    Insurance/Certification information:  PT Insurance Information: Medicare/[employer]  Physician Information:  Referring Practitioner: Dr. Rivera [facility] of care signed (Y/N): Y    Date of Patient follow up with Physician:      Progress Report: []  Yes  [x]  No     Date Range for reporting period:  Beginnin21  Ending:      Progress report due (10 Rx/or 30 days whichever is less):      Recertification due (POC duration/ or 90 days whichever is less):      Visit # POC/Insurance Allowable Auth Needed   4 14 []Yes    []No     Pain level:  /10     History of Injury:Patient stated she had surgery 21. Pt stated she has a lot of pain at times, but Percocet helps control pain. Pt stated she has had shoulder problems for \"a long time. \"  Pt stated she was instructed to wear her sling all the time. \"         SUBJECTIVE:  See eval   21 pt stated she was instructed to come to PT 1 session, wait 3 weeks, then return to PT for 1x/week for 3 weeks. 12/3/21 Pt stated she returned to MD last week and \"he said that you look like all my patients should look a week after my surgery. \"  \"It hurts bad. \"  Pt stated she refuses to use percocet because \"it makes me sick. \"  Pt stated she is not using CP often at home. PT recommended CP 20', 4x/day. 12/17/21 pt stated she was instructed she can stop wearing her sling unless she will be in a crowded area. 12/22/21 did \"great\" after last PT session, \"its doing really well actually\". HEP is going well. Pt has pain at the shoulder blade    OBJECTIVE:    Observation:   Quick Dash: 11/19/21 49 raw; 86.4% dysfunction      Test measurements:    PROM Right  11/19/21 Right  12/22/21   Shoulder Flex    Shoulder Abd NT    Shoulder ER NT 40   Shoulder IR NT                  Strength  Right    Shoulder Flex NT NT   Shoulder Scap NT NT   Shoulder ER NT NT   Shoulder IR NT NT       RESTRICTIONS/PRECAUTIONS: follow protocol, pt has delayed protocol from Dr. Joanna Davis    Exercises/Interventions:   Therapeutic Ex (21815)  Min: 15 Resistance/Repetitions CUES/Notes   Overhead pulley 10x    Table slides flexion 10x    Pendulum 30x cw, 30x ccw    Wall walking  Flexion  Scaption   10x flexion  Not yet              Isometric ABD vs man resist Light, 10x    Isometric ABD vs wall Light, 10x    AAROM elbow flex/ext 10x         Manual Intervention  (50443)  Min: 15     STM R upper quarter STM rhomboid, levator scap, scalenes         NMR re-education (42873)  Min: 15     Roll ball on table  Flexion Green ball  20x    Shrug 10x    Rhomboid pinch 10x    Isometric ABD vs wall 10x    [place] from floor 15x flexion    AAROM flexion to 140 10x2    AAROM ER to 40 10x2                        Therapeutic Activity (71608)  Min:               Modalities:  Min     CP R shoulder Pt elected to do at home           Other Therapeutic Activities:  Pt was educated on PT POC, Diagnosis, Prognosis, pathomechanics as well as frequency and duration of scheduling future physical therapy appointments. Time was also taken on this day to answer all patient questions and participation in PT. Reviewed appointment policy in detail with patient and patient verbalized understanding.      Home Exercise Program:  Access Code: [facility]  URL: Ma[employer]/  Date: 11/19/2021  Prepared by: Momo Pearson     Exercises  Seated Scapular Retraction - 3 x daily - 7 x weekly - 10 reps - 5 hold  Standing Shoulder Shrugs - 3 x daily - 7 x weekly - 10 reps - 5 hold  Isometric Shoulder Abduction at Wall - 3 x daily - 7 x weekly - 10 reps - 5 hold  Supported Elbow Flexion Extension PROM - 3 x daily - 7 x weekly - 10 reps    Access Code: [facility]  URL: MaPS/  Date: 12/17/2021  Prepared by: Momo Pearson  Seated Scapular Retraction - 3 x daily - 7 x weekly - 10 reps - 5 hold  Standing Shoulder Shrugs - 3 x daily - 7 x weekly - 10 reps - 5 hold  Isometric Shoulder Abduction at Wall - 3 x daily - 7 x weekly - 10 reps - 5 hold  Supported Elbow Flexion Extension PROM - 3 x daily - 7 x weekly - 10 reps  Seated Shoulder Flexion Towel Slide at Table Top Full Range of Motion - 3 x daily - 7 x weekly - 10 reps - 5 hold  Circular Shoulder Pendulum with Table Support - 3 x daily - 7 x weekly - 10 reps  Supine Shoulder Flexion Extension AAROM with Dowel - 3 x daily - 7 x weekly - 10 reps         Therapeutic Exercise and NMR EXR  [] (97327) Provided verbal/tactile cueing for activities related to strengthening, flexibility, endurance, ROM  for improvements in scapular, scapulothoracic and UE control with self care, reaching, carrying, lifting, house/yardwork, driving/computer work.    [] (65132) Provided verbal/tactile cueing for activities related to improving balance, coordination, kinesthetic sense, posture, motor skill, proprioception  to assist with  scapular, scapulothoracic and UE control with self care, reaching, carrying, lifting, house/yardwork, driving/computer work.     Therapeutic Activities:    [] (03511 or 35745) Provided verbal/tactile cueing for activities related to improving balance, coordination, kinesthetic sense, posture, motor skill, proprioception and motor activation to allow for proper function of scapular, scapulothoracic and UE control with self care, carrying, lifting, driving/computer work. Home Exercise Program:    [x] (50270) Reviewed/Progressed HEP activities related to strengthening, flexibility, endurance, ROM of scapular, scapulothoracic and UE control with self care, reaching, carrying, lifting, house/yardwork, driving/computer work  [] (47443) Reviewed/Progressed HEP activities related to improving balance, coordination, kinesthetic sense, posture, motor skill, proprioception of scapular, scapulothoracic and UE control with self care, reaching, carrying, lifting, house/yardwork, driving/computer work      Manual Treatments:  PROM / STM / Oscillations-Mobs:  G-I, II, III, IV (PA's, Inf., Post.)  [] (84815) Provided manual therapy to mobilize soft tissue/joints of cervical/CT, scapular GHJ and UE for the purpose of modulating pain, promoting relaxation,  increasing ROM, reducing/eliminating soft tissue swelling/inflammation/restriction, improving soft tissue extensibility and allowing for proper ROM for normal function with self care, reaching, carrying, lifting, house/yardwork, driving/computer work    Charges:  Timed Code Treatment Minutes: 40   Total Treatment Minutes: 40       [] EVAL (LOW) 91980 (typically 20 minutes face-to-face)  [] EVAL (MOD) 34785 (typically 30 minutes face-to-face)  [] EVAL (HIGH) 46111 (typically 45 minutes face-to-face)  [] RE-EVAL     [x] QQ(70915) x     [] Dry needle 1 or 2 Muscles (77396)  [x] NMR (41965) x     [] Dry needle 3+ Muscles (24973)  [x] Manual (18445) x     [] Ultrasound (53593) x  [] TA (22223) x     [] Mech Traction (66277)  [] ES(attended) (67647)     [] ES (un) (09374):   [] Vasopump (73192) [] Ionto (29432)   [] Other:        GOALS:  Patient stated goal: \"ability to move arm\"  []? Progressing: []? Met: []? Not Met: []? Adjusted     Therapist goals for Patient:   Short Term Goals: To be achieved in: 2 weeks  1.  Independent in up with physician  [] Other    Prognosis for POC: [x] Good [] Fair  [] Poor    Patient requires continued skilled intervention: [x] Yes  [] No      PLAN: Follow protocol  [] Continue per plan of care [] Alter current plan (see comments)  [x] Plan of care initiated [] Hold pending MD visit [] Discharge    Electronically signed by: Cam Chester PT     Note: If patient does not return for scheduled/recommended follow up visits, this note will serve as a discharge from care along with the most recent update on progress.

## 2021-12-27 ENCOUNTER — APPOINTMENT (OUTPATIENT)
Dept: PHYSICAL THERAPY | Age: 66
End: 2021-12-27
Payer: MEDICARE

## 2021-12-29 ENCOUNTER — HOSPITAL ENCOUNTER (OUTPATIENT)
Dept: PHYSICAL THERAPY | Age: 66
Setting detail: THERAPIES SERIES
Discharge: HOME | End: 2021-12-29
Payer: MEDICARE

## 2021-12-29 PROCEDURE — 97140 MANUAL THERAPY 1/> REGIONS: CPT

## 2021-12-29 PROCEDURE — 97110 THERAPEUTIC EXERCISES: CPT

## 2021-12-29 PROCEDURE — 97112 NEUROMUSCULAR REEDUCATION: CPT

## 2021-12-29 NOTE — FLOWSHEET NOTE
[facility]  Outpatient Rehabilitation and Therapy, [place] [address]  Phone: [phone]   Fax:     [phone]        Physical Therapy Treatment Note/ Progress Report:       Date:  2021    Patient Name:  Rosalva Marroquin   \"Lisa\" :  1955  MRN: [id number]    Pertinent Medical History:     Medical/Treatment Diagnosis Information:  · Diagnosis: Repair massive tear with patch; delayed rehab; Right shoulder impingement, chronic rotatorcuff tear, biceps tendon tear with chronic biceps tendon tear, early osteoarthritis, massive three-tendon rotator cuff tear with thinning ofthe tendon. (Z91.660)  · Treatment Diagnosis: Decreased ADL status    Insurance/Certification information:  PT Insurance Information: Medicare/[employer]  Physician Information:  Referring Practitioner: Dr. Aguillon Median of care signed (Y/N): Y    Date of Patient follow up with Physician:      Progress Report: []  Yes  [x]  No     Date Range for reporting period:  Beginnin21  Ending:      Progress report due (10 Rx/or 30 days whichever is less):      Recertification due (POC duration/ or 90 days whichever is less):      Visit # POC/Insurance Allowable Auth Needed   5 14 []Yes    []No     Pain level:  /10     History of Injury:Patient stated she had surgery 21. Pt stated she has a lot of pain at times, but Percocet helps control pain. Pt stated she has had shoulder problems for \"a long time. \"  Pt stated she was instructed to wear her sling all the time. \"         SUBJECTIVE:  See eval   21 pt stated she was instructed to come to PT 1 session, wait 3 weeks, then return to PT for 1x/week for 3 weeks. 12/3/21 Pt stated she returned to MD last week and \"he said that you look like all my patients should look a week after my surgery. \"  \"It hurts bad. \"  Pt stated she refuses to use percocet because \"it makes me sick. \"  Pt stated she is not using CP often at home. PT recommended CP 20', 4x/day. 12/17/21 pt stated she was instructed she can stop wearing her sling unless she will be in a crowded area. 12/22/21 did \"great\" after last PT session, \"its doing really well actually\". HEP is going well. Pt has pain at the shoulder blade  12/29: Last week was really bad after therapy. Muscle spasms all through upper back and shoulders. Felt like she had a knot poking her    OBJECTIVE:    Observation:   Quick Dash: 11/19/21 49 raw; 86.4% dysfunction      Test measurements:    PROM     Right  11/19/21 Right  12/22/21   Shoulder Flex    Shoulder Abd NT    Shoulder ER NT 40   Shoulder IR NT                  Strength  Right    Shoulder Flex NT NT   Shoulder Scap NT NT   Shoulder ER NT NT   Shoulder IR NT NT       RESTRICTIONS/PRECAUTIONS: follow protocol, pt has delayed protocol from Dr. Kiersten Sullivan    Exercises/Interventions:   Therapeutic Ex (24889)  Min: 15 Resistance/Repetitions CUES/Notes   Overhead pulley 10x    Table slides flexion 10x    Pendulum 30x cw, 30x ccw    Wall walking  Flexion  Scaption   10x flexion  Not yet              Isometric ABD vs man resist Light, 10x    Isometric ABD vs wall Light, 10x    AAROM elbow flex/ext 10x         Manual Intervention  (88247)  Min: 10     STM R upper quarter STM rhomboid, levator scap, scalenes         NMR re-education (37147)  Min: 15     Roll ball on table  Flexion Green ball  20x    Shrug 10x    Rhomboid pinch 10x    Isometric ABD vs wall 10x    [place] from floor 15x flexion    AAROM flexion to 140 10x2    AAROM ER to 40 10x2                        Therapeutic Activity (76628)  Min:               Modalities:  Min     CP R shoulder Pt elected to do at home           Other Therapeutic Activities:  Pt was educated on PT POC, Diagnosis, Prognosis, pathomechanics as well as frequency and duration of scheduling future physical therapy appointments.  Time was also taken on this day to answer all patient questions and participation in PT. Reviewed appointment policy in detail with patient and patient verbalized understanding. Home Exercise Program:  Access Code: [facility]  URL: [email or web address]  Date: 11/19/2021  Prepared by: Liana Barth     Exercises  Seated Scapular Retraction - 3 x daily - 7 x weekly - 10 reps - 5 hold  Standing Shoulder Shrugs - 3 x daily - 7 x weekly - 10 reps - 5 hold  Isometric Shoulder Abduction at Wall - 3 x daily - 7 x weekly - 10 reps - 5 hold  Supported Elbow Flexion Extension PROM - 3 x daily - 7 x weekly - 10 reps    Access Code: [facility]  URL: [email or web address]  Date: 12/17/2021  Prepared by: Liana Barth  Seated Scapular Retraction - 3 x daily - 7 x weekly - 10 reps - 5 hold  Standing Shoulder Shrugs - 3 x daily - 7 x weekly - 10 reps - 5 hold  Isometric Shoulder Abduction at Wall - 3 x daily - 7 x weekly - 10 reps - 5 hold  Supported Elbow Flexion Extension PROM - 3 x daily - 7 x weekly - 10 reps  Seated Shoulder Flexion Towel Slide at Table Top Full Range of Motion - 3 x daily - 7 x weekly - 10 reps - 5 hold  Circular Shoulder Pendulum with Table Support - 3 x daily - 7 x weekly - 10 reps  Supine Shoulder Flexion Extension AAROM with Dowel - 3 x daily - 7 x weekly - 10 reps         Therapeutic Exercise and NMR EXR  [] (93486) Provided verbal/tactile cueing for activities related to strengthening, flexibility, endurance, ROM  for improvements in scapular, scapulothoracic and UE control with self care, reaching, carrying, lifting, house/yardwork, driving/computer work.    [] (94750) Provided verbal/tactile cueing for activities related to improving balance, coordination, kinesthetic sense, posture, motor skill, proprioception  to assist with  scapular, scapulothoracic and UE control with self care, reaching, carrying, lifting, house/yardwork, driving/computer work.     Therapeutic Activities:    [] (11983 or 46610) Provided verbal/tactile cueing for activities related to improving balance, coordination, kinesthetic sense, posture, motor skill, proprioception and motor activation to allow for proper function of scapular, scapulothoracic and UE control with self care, carrying, lifting, driving/computer work. Home Exercise Program:    [x] (56340) Reviewed/Progressed HEP activities related to strengthening, flexibility, endurance, ROM of scapular, scapulothoracic and UE control with self care, reaching, carrying, lifting, house/yardwork, driving/computer work  [] (11543) Reviewed/Progressed HEP activities related to improving balance, coordination, kinesthetic sense, posture, motor skill, proprioception of scapular, scapulothoracic and UE control with self care, reaching, carrying, lifting, house/yardwork, driving/computer work      Manual Treatments:  PROM / STM / Oscillations-Mobs:  G-I, II, III, IV (PA's, Inf., Post.)  [] (14165) Provided manual therapy to mobilize soft tissue/joints of cervical/CT, scapular GHJ and UE for the purpose of modulating pain, promoting relaxation,  increasing ROM, reducing/eliminating soft tissue swelling/inflammation/restriction, improving soft tissue extensibility and allowing for proper ROM for normal function with self care, reaching, carrying, lifting, house/yardwork, driving/computer work    Charges:  Timed Code Treatment Minutes: 40   Total Treatment Minutes: 40       [] EVAL (LOW) 50044 (typically 20 minutes face-to-face)  [] EVAL (MOD) 67115 (typically 30 minutes face-to-face)  [] EVAL (HIGH) 96664 (typically 45 minutes face-to-face)  [] RE-EVAL     [x] XV(99546) x     [] Dry needle 1 or 2 Muscles (87184)  [x] NMR (61970) x     [] Dry needle 3+ Muscles (41700)  [x] Manual (63682) x     [] Ultrasound (23735) x  [] TA (80855) x     [] Mech Traction (41965)  [] ES(attended) (88271)     [] ES (un) (11501):   [] Vasopump (32423) [] Ionto (34691)   [] Other:        GOALS:  Patient stated goal: \"ability to move arm\"  []? Progressing: []? Met: []? Not Met: []? Adjusted     Therapist goals for Patient:   Short Term Goals: To be achieved in: 2 weeks  1. Independent in HEP and progression per patient tolerance, in order to prevent re-injury. []? Progressing: []? Met: []? Not Met: []? Adjusted  2. Patient will have a decrease in pain to facilitate improvement in movement, function, and ADLs as indicated by Functional Deficits. []? Progressing: []? Met: []? Not Met: []? Adjusted     Long Term Goals: To be achieved in: 12 weeks  1. Disability index score of 35% or less for the Quick DASH to assist with reaching prior level of function. []? Progressing: []? Met: []? Not Met: []? Adjusted  2. Patient will demonstrate increased AROM to 140 degrees to allow for proper joint functioning as indicated by Functional Deficits. []? Progressing: []? Met: []? Not Met: []? Adjusted  3. Patient will demonstrate an increase in NM recruitment/activation and overall GH and scapular strength to within n5lbs HHD or WNL for proper functional mobility as indicated by patients Functional Deficits. []? Progressing: []? Met: []? Not Met: []? Adjusted  4. Patient will return to house cleaning activities without increased symptoms or restriction. []? Progressing: []? Met: []? Not Met: []? Adjusted  5. Patient will be able to sleep at night without waking due to pain. []? Progressing: []? Met: []? Not Met: []? Adjusted    ASSESSMENT:  See eval    Treatment/Activity Tolerance:  [x] Patient tolerated treatment well [] Patient limited by fatique  [] Patient limited by pain  [] Patient limited by other medical complications  [] Other:     Overall Progression Towards Functional goals/ Treatment Progress Update:  [] Patient is progressing as expected towards functional goals listed. [] Progression is slowed due to complexities/Impairments listed. [] Progression has been slowed due to co-morbidities.   [x] Plan just implemented, too soon to assess goals progression <30days   [] Goals require adjustment due to lack of progress  [] Patient is not progressing as expected and requires additional follow up with physician  [] Other    Prognosis for POC: [x] Good [] Fair  [] Poor    Patient requires continued skilled intervention: [x] Yes  [] No      PLAN: Follow protocol  [x] Continue per plan of care [] Alter current plan (see comments)  [] Plan of care initiated [] Hold pending MD visit [] Discharge    Electronically signed by: Irlanda Marques, PTA [id number]    Note: If patient does not return for scheduled/recommended follow up visits, this note will serve as a discharge from care along with the most recent update on progress.

## 2022-01-03 ENCOUNTER — HOSPITAL ENCOUNTER (OUTPATIENT)
Dept: PHYSICAL THERAPY | Age: 67
Setting detail: THERAPIES SERIES
Discharge: HOME | End: 2022-01-03
Payer: MEDICARE

## 2022-01-03 PROCEDURE — 97112 NEUROMUSCULAR REEDUCATION: CPT

## 2022-01-03 PROCEDURE — 97110 THERAPEUTIC EXERCISES: CPT

## 2022-01-03 PROCEDURE — 97140 MANUAL THERAPY 1/> REGIONS: CPT

## 2022-01-03 NOTE — FLOWSHEET NOTE
[facility], [place] [address]. [place] [address]  Phone: [phone]   Fax:     [phone]        Physical Therapy Treatment Note/ Progress Report:       Date:  1/3/2022    Patient Name:  Orin Sellers   \"Lisa\" :  1955  MRN: [id number]    Pertinent Medical History:     Medical/Treatment Diagnosis Information:  · Diagnosis: Repair massive tear with patch; delayed rehab; Right shoulder impingement, chronic rotatorcuff tear, biceps tendon tear with chronic biceps tendon tear, early osteoarthritis, massive three-tendon rotator cuff tear with thinning ofthe tendon. (W29.408)  · Treatment Diagnosis: Decreased ADL status    Insurance/Certification information:  PT Insurance Information: Medicare/[employer]  Physician Information:  Referring Practitioner: Dr. Jaxson Jha of care signed (Y/N): Y    Date of Patient follow up with Physician:      Progress Report: []  Yes  [x]  No     Date Range for reporting period:  Beginnin21  Ending:      Progress report due (10 Rx/or 30 days whichever is less):      Recertification due (POC duration/ or 90 days whichever is less):      Visit # POC/Insurance Allowable Auth Needed   6 14 []Yes    []No     Pain level:  /10     History of Injury:Patient stated she had surgery 21. Pt stated she has a lot of pain at times, but Percocet helps control pain. Pt stated she has had shoulder problems for \"a long time. \"  Pt stated she was instructed to wear her sling all the time. \"         SUBJECTIVE:  See eval   21 pt stated she was instructed to come to PT 1 session, wait 3 weeks, then return to PT for 1x/week for 3 weeks. 12/3/21 Pt stated she returned to MD last week and \"he said that you look like all my patients should look a week after my surgery. \"  \"It hurts bad. \"  Pt stated she refuses to use percocet because \"it makes me sick. \"  Pt stated she is not using CP often at home. PT recommended CP 20', 4x/day. 12/17/21 pt stated she was instructed she can stop wearing her sling unless she will be in a crowded area. 12/22/21 did \"great\" after last PT session, \"its doing really well actually\". HEP is going well. Pt has pain at the shoulder blade  12/29: Last week was really bad after therapy. Muscle spasms all through upper back and shoulders.  Felt like she had a knot poking her  1/3/22 pt stated her spasms were relieved following last treatment    OBJECTIVE:    Observation:   Quick Dash: 11/19/21 49 raw; 86.4% dysfunction      Test measurements:    PROM     Right  11/19/21 Right  12/22/21   Shoulder Flex    Shoulder Abd NT    Shoulder ER NT 40   Shoulder IR NT                  Strength  Right    Shoulder Flex NT NT   Shoulder Scap NT NT   Shoulder ER NT NT   Shoulder IR NT NT       RESTRICTIONS/PRECAUTIONS: follow protocol, pt has delayed protocol from Dr. Gwendolyn Victor    Exercises/Interventions:   Therapeutic Ex (50606)  Min: 15 Resistance/Repetitions CUES/Notes   Overhead pulley 10x    Table slides flexion 10x    Pendulum 30x cw, 30x ccw    Wall walking  Flexion  Scaption   10x flexion  Not yet    Supine wand  Flexion   ER up to 40 deg   15x  15x         Isometric ABD vs man resist Light, 10x    Isometric ABD vs wall Light, 10x    AAROM elbow flex/ext 10x         Manual Intervention  (59067)  Min: 15     STM R upper quarter STM rhomboid, levator scap, scalenes    PROM R shoulder  Flexion to 140  ER to 40   2x10  2x10    NMR re-education (82808)  Min: 15     Roll ball on table  Flexion Green ball  20x    Shrug 10x    Rhomboid pinch 10x    Isometric ABD vs wall 10x    [place] from floor 15x flexion    AAROM flexion to 140 10x2    AAROM ER to 40 10x2    AAROM scapula  PNF D1  PNF D2   10x  10x                   Therapeutic Activity (74332)  Min:               Modalities:  Min     CP R shoulder Pt elected to do at home           Other Therapeutic Activities:  Pt was educated on PT POC, Diagnosis, Prognosis, pathomechanics as well as frequency and duration of scheduling future physical therapy appointments. Time was also taken on this day to answer all patient questions and participation in PT. Reviewed appointment policy in detail with patient and patient verbalized understanding. Home Exercise Program:  Access Code: [facility]  URL: [email or web address]  Date: 11/19/2021  Prepared by: Madhu Rifhortencia     Exercises  Seated Scapular Retraction - 3 x daily - 7 x weekly - 10 reps - 5 hold  Standing Shoulder Shrugs - 3 x daily - 7 x weekly - 10 reps - 5 hold  Isometric Shoulder Abduction at Wall - 3 x daily - 7 x weekly - 10 reps - 5 hold  Supported Elbow Flexion Extension PROM - 3 x daily - 7 x weekly - 10 reps    Access Code: [facility]  URL: [email or web address]  Date: 12/17/2021  Prepared by: Madhu Riff  Seated Scapular Retraction - 3 x daily - 7 x weekly - 10 reps - 5 hold  Standing Shoulder Shrugs - 3 x daily - 7 x weekly - 10 reps - 5 hold  Isometric Shoulder Abduction at Wall - 3 x daily - 7 x weekly - 10 reps - 5 hold  Supported Elbow Flexion Extension PROM - 3 x daily - 7 x weekly - 10 reps  Seated Shoulder Flexion Towel Slide at Table Top Full Range of Motion - 3 x daily - 7 x weekly - 10 reps - 5 hold  Circular Shoulder Pendulum with Table Support - 3 x daily - 7 x weekly - 10 reps  Supine Shoulder Flexion Extension AAROM with Dowel - 3 x daily - 7 x weekly - 10 reps         Therapeutic Exercise and NMR EXR  [] (20323) Provided verbal/tactile cueing for activities related to strengthening, flexibility, endurance, ROM  for improvements in scapular, scapulothoracic and UE control with self care, reaching, carrying, lifting, house/yardwork, driving/computer work.    [] (30218) Provided verbal/tactile cueing for activities related to improving balance, coordination, kinesthetic sense, posture, motor skill, proprioception  to assist with scapular, scapulothoracic and UE control with self care, reaching, carrying, lifting, house/yardwork, driving/computer work. Therapeutic Activities:    [] (60181 or 02978) Provided verbal/tactile cueing for activities related to improving balance, coordination, kinesthetic sense, posture, motor skill, proprioception and motor activation to allow for proper function of scapular, scapulothoracic and UE control with self care, carrying, lifting, driving/computer work.      Home Exercise Program:    [x] (19903) Reviewed/Progressed HEP activities related to strengthening, flexibility, endurance, ROM of scapular, scapulothoracic and UE control with self care, reaching, carrying, lifting, house/yardwork, driving/computer work  [] (88487) Reviewed/Progressed HEP activities related to improving balance, coordination, kinesthetic sense, posture, motor skill, proprioception of scapular, scapulothoracic and UE control with self care, reaching, carrying, lifting, house/yardwork, driving/computer work      Manual Treatments:  PROM / STM / Oscillations-Mobs:  G-I, II, III, IV (PA's, Inf., Post.)  [] (77310) Provided manual therapy to mobilize soft tissue/joints of cervical/CT, scapular GHJ and UE for the purpose of modulating pain, promoting relaxation,  increasing ROM, reducing/eliminating soft tissue swelling/inflammation/restriction, improving soft tissue extensibility and allowing for proper ROM for normal function with self care, reaching, carrying, lifting, house/yardwork, driving/computer work    Charges:  Timed Code Treatment Minutes: 45   Total Treatment Minutes: 45       [] EVAL (LOW) 01544 (typically 20 minutes face-to-face)  [] EVAL (MOD) 88055 (typically 30 minutes face-to-face)  [] EVAL (HIGH) 70503 (typically 45 minutes face-to-face)  [] RE-EVAL     [x] GE(81464) x     [] Dry needle 1 or 2 Muscles (15998)  [x] NMR (44194) x     [] Dry needle 3+ Muscles (63751)  [x] Manual (90777) x     [] Ultrasound (27531) x  [] TA (23247) x     [] [place] Traction (54064)  [] ES(attended) (31129)     [] ES (un) (38764):   [] Vasopump (48662) [] Ionto (53038)   [] Other:        GOALS:  Patient stated goal: \"ability to move arm\"  []? Progressing: []? Met: []? Not Met: []? Adjusted     Therapist goals for Patient:   Short Term Goals: To be achieved in: 2 weeks  1. Independent in HEP and progression per patient tolerance, in order to prevent re-injury. []? Progressing: []? Met: []? Not Met: []? Adjusted  2. Patient will have a decrease in pain to facilitate improvement in movement, function, and ADLs as indicated by Functional Deficits. []? Progressing: []? Met: []? Not Met: []? Adjusted     Long Term Goals: To be achieved in: 12 weeks  1. Disability index score of 35% or less for the Quick DASH to assist with reaching prior level of function. []? Progressing: []? Met: []? Not Met: []? Adjusted  2. Patient will demonstrate increased AROM to 140 degrees to allow for proper joint functioning as indicated by Functional Deficits. []? Progressing: []? Met: []? Not Met: []? Adjusted  3. Patient will demonstrate an increase in NM recruitment/activation and overall GH and scapular strength to within n5lbs HHD or WNL for proper functional mobility as indicated by patients Functional Deficits. []? Progressing: []? Met: []? Not Met: []? Adjusted  4. Patient will return to house cleaning activities without increased symptoms or restriction. []? Progressing: []? Met: []? Not Met: []? Adjusted  5. Patient will be able to sleep at night without waking due to pain. []? Progressing: []? Met: []? Not Met: []?  Adjusted    ASSESSMENT:  See eval    Treatment/Activity Tolerance:  [x] Patient tolerated treatment well [] Patient limited by fatique  [] Patient limited by pain  [] Patient limited by other medical complications  [] Other:     Overall Progression Towards Functional goals/ Treatment Progress Update:  [] Patient is progressing as expected towards functional goals listed. [] Progression is slowed due to complexities/Impairments listed. [] Progression has been slowed due to co-morbidities. [x] Plan just implemented, too soon to assess goals progression <30days   [] Goals require adjustment due to lack of progress  [] Patient is not progressing as expected and requires additional follow up with physician  [] Other    Prognosis for POC: [x] Good [] Fair  [] Poor    Patient requires continued skilled intervention: [x] Yes  [] No      PLAN: Follow protocol; reassess  [x] Continue per plan of care [] Alter current plan (see comments)  [] Plan of care initiated [] Hold pending MD visit [] Discharge    Electronically signed by: Sho Sanchez PT     Note: If patient does not return for scheduled/recommended follow up visits, this note will serve as a discharge from care along with the most recent update on progress.

## 2022-01-05 ENCOUNTER — HOSPITAL ENCOUNTER (OUTPATIENT)
Dept: PHYSICAL THERAPY | Age: 67
Setting detail: THERAPIES SERIES
Discharge: HOME | End: 2022-01-05
Payer: MEDICARE

## 2022-01-05 PROCEDURE — 97110 THERAPEUTIC EXERCISES: CPT

## 2022-01-05 PROCEDURE — 97140 MANUAL THERAPY 1/> REGIONS: CPT

## 2022-01-05 PROCEDURE — 97112 NEUROMUSCULAR REEDUCATION: CPT

## 2022-01-05 NOTE — FLOWSHEET NOTE
Physical Therapy Re-Certification Plan of Care/MD UPDATE      Dear Dr. Becky Dumas ,    We had the pleasure of treating the following patient for physical therapy services at [address] [place] [address]. A summary of our findings can be found in the updated assessment below. This includes our plan of care. If you have any questions or concerns regarding these findings, please do not hesitate to contact me at the office phone number checked above. Thank you for the referral.     Physician Signature:________________________________Date:__________________  By signing above (or electronic signature), therapists plan is approved by physician    Date Range Of Visits: 21-22  Total Visits to Date: 7  Overall Response to Treatment:   [x]Patient is responding well to treatment and improvement is noted with regards  to goals   []Patient should continue to improve in reasonable time if they continue HEP   []Patient has plateaued and is no longer responding to skilled PT intervention    []Patient is getting worse and would benefit from return to referring MD   []Patient unable to adhere to initial POC   []Other: pain levels fluctuate, but overall the trend is pain is decreasing. ROM is progressing very well, ROM activities have not been aggressive. Recommendation:    [x]Continue PT 1-2x / wk for 4 weeks. []Hold PT, pending MD visit                                                      [facility], [facility] [address]  [place] [address]  Phone: [phone]   Fax:     [phone]        Physical Therapy Treatment Note/ Progress Report:       Date:  2022    Patient Name:  Shantanu Ignacio   \"Lisa\" :  1955  MRN: [id number]    Pertinent Medical History:     Medical/Treatment Diagnosis Information:  · Diagnosis: Repair massive tear with patch; delayed rehab; Right shoulder impingement, chronic rotatorcuff tear, biceps tendon tear with chronic biceps tendon tear, early osteoarthritis, massive three-tendon rotator cuff tear with thinning ofthe tendon. (M75.120)  · Treatment Diagnosis: Decreased ADL status    Insurance/Certification information:  PT Insurance Information: Medicare/[facility]  Physician Information:  Referring Practitioner: Dr. Olivares Media of care signed (Y/N): Y    Date of Patient follow up with Physician:      Progress Report: []  Yes  [x]  No     Date Range for reporting period:  Beginnin21  Ending:      Progress report due (10 Rx/or 30 days whichever is less):      Recertification due (POC duration/ or 90 days whichever is less):      Visit # POC/Insurance Allowable Auth Needed   7 14 []Yes    []No     Pain level:  0/10     History of Injury:Patient stated she had surgery 21. Pt stated she has a lot of pain at times, but Percocet helps control pain. Pt stated she has had shoulder problems for \"a long time. \"  Pt stated she was instructed to wear her sling all the time. \"         SUBJECTIVE:  See eval   21 pt stated she was instructed to come to PT 1 session, wait 3 weeks, then return to PT for 1x/week for 3 weeks. 12/3/21 Pt stated she returned to MD last week and \"he said that you look like all my patients should look a week after my surgery. \"  \"It hurts bad. \"  Pt stated she refuses to use percocet because \"it makes me sick. \"  Pt stated she is not using CP often at home. PT recommended CP 20', 4x/day. 21 pt stated she was instructed she can stop wearing her sling unless she will be in a crowded area. 21 did \"great\" after last PT session, \"its doing really well actually\". HEP is going well. Pt has pain at the shoulder blade  : Last week was really bad after therapy. Muscle spasms all through upper back and shoulders.  Felt like she had a knot poking her  1/3/22 pt stated her spasms were relieved following last treatment  22 pt stated she does not have shoulder pain today, but she feels aches. Pt can use R UE to fix her hair. Because pain has been low she sometimes does not think about using the arm and it catches when she reaches  Pt stated she will see Dr. Tammy Macedo tomorrow    OBJECTIVE:   Candace Observation:   Lexie Else: 11/19/21 49 raw; 86.4% dysfunction      Test measurements:    PROM     Right  11/19/21 Right  12/22/21 Right  1/5/22   Shoulder Flex  150   Shoulder Abd NT     Shoulder ER NT 40 60  (not pushing aggressively)   Shoulder IR NT                     Strength  Right     Shoulder Flex NT NT    Shoulder Scap NT NT    Shoulder ER NT NT    Shoulder IR NT NT        RESTRICTIONS/PRECAUTIONS: follow protocol, pt has delayed protocol from Dr. Tammy Macedo    Exercises/Interventions:   Therapeutic Ex (20257)  Min: 15 Resistance/Repetitions CUES/Notes   Overhead pulley 30x    Table slides flexion 10x    Pendulum 30x cw, 30x ccw    Wall walking  Flexion  Scaption   10x flexion  5x    Supine wand  Flexion   ER up to 40 deg   15x  15x         Isometric ABD vs man resist Light, 10x    Isometric ABD vs wall Light, 10x    AAROM elbow flex/ext 10x         Manual Intervention  (22518)  Min: 15     STM R upper quarter STM rhomboid, levator scap, scalenes    PROM R shoulder  Flexion to 140  ER to 40   2x10  2x10    NMR re-education (79427)  Min: 15     Roll ball on table  Flexion Green ball  20x    Shrug 10x    Rhomboid pinch 10x    Isometric ABD vs wall 10x    [place] 20x flexion    AAROM flexion to 140 10x2    AAROM ER to 40 10x2    AAROM scapula  PNF D1  PNF D2   10x  10x                   Therapeutic Activity (42827)  Min:               Modalities:  Min     CP R shoulder Pt elected to do at home           Other Therapeutic Activities:  Pt was educated on PT POC, Diagnosis, Prognosis, pathomechanics as well as frequency and duration of scheduling future physical therapy appointments. Time was also taken on this day to answer all patient questions and participation in PT.  Reviewed appointment policy in detail with patient and patient verbalized understanding. Home Exercise Program:  Access Code: [facility]  URL: [email or web address]  Date: 11/19/2021  Prepared by: Criselda Romp     Exercises  Seated Scapular Retraction - 3 x daily - 7 x weekly - 10 reps - 5 hold  Standing Shoulder Shrugs - 3 x daily - 7 x weekly - 10 reps - 5 hold  Isometric Shoulder Abduction at Wall - 3 x daily - 7 x weekly - 10 reps - 5 hold  Supported Elbow Flexion Extension PROM - 3 x daily - 7 x weekly - 10 reps    Access Code: [facility]  URL: [email or web address]  Date: 12/17/2021  Prepared by: Criselda Romp  Seated Scapular Retraction - 3 x daily - 7 x weekly - 10 reps - 5 hold  Standing Shoulder Shrugs - 3 x daily - 7 x weekly - 10 reps - 5 hold  Isometric Shoulder Abduction at Wall - 3 x daily - 7 x weekly - 10 reps - 5 hold  Supported Elbow Flexion Extension PROM - 3 x daily - 7 x weekly - 10 reps  Seated Shoulder Flexion Towel Slide at Table Top Full Range of Motion - 3 x daily - 7 x weekly - 10 reps - 5 hold  Circular Shoulder Pendulum with Table Support - 3 x daily - 7 x weekly - 10 reps  Supine Shoulder Flexion Extension AAROM with Dowel - 3 x daily - 7 x weekly - 10 reps         Therapeutic Exercise and NMR EXR  [] (62084) Provided verbal/tactile cueing for activities related to strengthening, flexibility, endurance, ROM  for improvements in scapular, scapulothoracic and UE control with self care, reaching, carrying, lifting, house/yardwork, driving/computer work.    [] (03846) Provided verbal/tactile cueing for activities related to improving balance, coordination, kinesthetic sense, posture, motor skill, proprioception  to assist with  scapular, scapulothoracic and UE control with self care, reaching, carrying, lifting, house/yardwork, driving/computer work.     Therapeutic Activities:    [] (42302 or ) Provided verbal/tactile cueing for activities related to improving balance, coordination, kinesthetic sense, posture, motor skill, proprioception and motor activation to allow for proper function of scapular, scapulothoracic and UE control with self care, carrying, lifting, driving/computer work. Home Exercise Program:    [x] (10656) Reviewed/Progressed HEP activities related to strengthening, flexibility, endurance, ROM of scapular, scapulothoracic and UE control with self care, reaching, carrying, lifting, house/yardwork, driving/computer work  [] (44981) Reviewed/Progressed HEP activities related to improving balance, coordination, kinesthetic sense, posture, motor skill, proprioception of scapular, scapulothoracic and UE control with self care, reaching, carrying, lifting, house/yardwork, driving/computer work      Manual Treatments:  PROM / STM / Oscillations-Mobs:  G-I, II, III, IV (PA's, Inf., Post.)  [] (88735) Provided manual therapy to mobilize soft tissue/joints of cervical/CT, scapular GHJ and UE for the purpose of modulating pain, promoting relaxation,  increasing ROM, reducing/eliminating soft tissue swelling/inflammation/restriction, improving soft tissue extensibility and allowing for proper ROM for normal function with self care, reaching, carrying, lifting, house/yardwork, driving/computer work    Charges:  Timed Code Treatment Minutes: 45   Total Treatment Minutes: 45       [] EVAL (LOW) 36846 (typically 20 minutes face-to-face)  [] EVAL (MOD) 64871 (typically 30 minutes face-to-face)  [] EVAL (HIGH) 27368 (typically 45 minutes face-to-face)  [] RE-EVAL     [x] QY(03436) x     [] Dry needle 1 or 2 Muscles (46833)  [x] NMR (06847) x     [] Dry needle 3+ Muscles (56423)  [x] Manual (36701) x     [] Ultrasound (63699) x  [] TA (59035) x     [] Mech Traction (79536)  [] ES(attended) (16015)     [] ES (un) (89725):   [] Vasopump (27397) [] Ionto (29613)   [] Other:        GOALS:  Patient stated goal: \"ability to move arm\"  []? Progressing: []? Met: []?  Not Met: []? Adjusted     Therapist goals for Patient:   Short Term Goals: To be achieved in: 2 weeks  1. Independent in HEP and progression per patient tolerance, in order to prevent re-injury. []? Progressing: []? Met: []? Not Met: []? Adjusted  2. Patient will have a decrease in pain to facilitate improvement in movement, function, and ADLs as indicated by Functional Deficits. []? Progressing: []? Met: []? Not Met: []? Adjusted     Long Term Goals: To be achieved in: 12 weeks  1. Disability index score of 35% or less for the Quick DASH to assist with reaching prior level of function. []? Progressing: []? Met: []? Not Met: []? Adjusted  2. Patient will demonstrate increased AROM to 140 degrees to allow for proper joint functioning as indicated by Functional Deficits. []? Progressing: []? Met: []? Not Met: []? Adjusted  3. Patient will demonstrate an increase in NM recruitment/activation and overall GH and scapular strength to within n5lbs HHD or WNL for proper functional mobility as indicated by patients Functional Deficits. []? Progressing: []? Met: []? Not Met: []? Adjusted  4. Patient will return to house cleaning activities without increased symptoms or restriction. []? Progressing: []? Met: []? Not Met: []? Adjusted  5. Patient will be able to sleep at night without waking due to pain. []? Progressing: []? Met: []? Not Met: []? Adjusted    ASSESSMENT:  See eval    Treatment/Activity Tolerance:  [x] Patient tolerated treatment well [] Patient limited by fatique  [] Patient limited by pain  [] Patient limited by other medical complications  [] Other:     Overall Progression Towards Functional goals/ Treatment Progress Update:  [] Patient is progressing as expected towards functional goals listed. [] Progression is slowed due to complexities/Impairments listed. [] Progression has been slowed due to co-morbidities.   [x] Plan just implemented, too soon to assess goals progression <30days   [] Goals require adjustment due to lack of progress  [] Patient is not progressing as expected and requires additional follow up with physician  [] Other    Prognosis for POC: [x] Good [] Fair  [] Poor    Patient requires continued skilled intervention: [x] Yes  [] No      PLAN: Follow protocol; reassess  [x] Continue per plan of care [] Alter current plan (see comments)  [] Plan of care initiated [] Hold pending MD visit [] Discharge    Electronically signed by: Lamar Krishna PT     Note: If patient does not return for scheduled/recommended follow up visits, this note will serve as a discharge from care along with the most recent update on progress.

## 2022-01-06 ENCOUNTER — OFFICE VISIT (OUTPATIENT)
Dept: ORTHOPEDIC SURGERY | Age: 67
End: 2022-01-06

## 2022-01-06 VITALS — BODY MASS INDEX: 29.77 KG/M2 | WEIGHT: 168 LBS | HEIGHT: 63 IN

## 2022-01-06 DIAGNOSIS — Z98.890 S/P ROTATOR CUFF SURGERY: Primary | ICD-10-CM

## 2022-01-06 DIAGNOSIS — Z98.890 S/P ARTHROSCOPY OF RIGHT SHOULDER: ICD-10-CM

## 2022-01-06 PROCEDURE — 99024 POSTOP FOLLOW-UP VISIT: CPT | Performed by: ORTHOPAEDIC SURGERY

## 2022-01-06 NOTE — PROGRESS NOTES
History of Present Illness: Johnny Mendoza is a 73 y.o. female who presents for a post operative visit. The patient underwent a right shoulder arthroscopy for a rotator cuff repair arthroscopic repair of massive three-tendon rotator cuff tear with patch augmentation on 11/16/2021. Currently she is 7 weeks postop. Patient is going to [facility] for her physical therapy. She reports that she has been very compliant with her restrictions so far. She also reports that she has been told that she is progressing well. The patient denies any fevers, chills, numbness, tingling, and shortness of breath. Medical History:  Patient's medications, allergies, past medical, surgical, social and family histories were reviewed and updated as appropriate. No notes on file    Review of Systems  A 14 point review of systems was completed by the patient is available in the media section of the scanned medical record and was reviewed on 1/6/2022. Vital Signs: There were no vitals filed for this visit. General/Appearance: Alert and oriented and in no apparent distress. Skin:  There are no skin lesions, cellulitis, or extreme edema. The patient has warm and well-perfused Bilateral upper extremities with brisk capillary refill.      right Shoulder Exam:    Inspection: right shoulder incision that is clean, dry and intact and well approximated. There is no erythema, drainage or other signs of infection    Palpation:  She has mild crepitus to gentle motion over the anterior shoulder    Active Range of Motion: Forward elevation to 140, Abduction to 140, ER of 45 and IR in the back to L5 midline    Passive Range of Motion:  deferred    Strength:  Deferred    Special Tests:  Deferred. Neurovascular: Sensation to light touch is intact, no motor deficits, palpable radial pulses 2+    Radiology:     Plain radiographs not obtained.         Assessment :  Ms. Johnny Mendoza is a 73 y.o. patient  underwent a right shoulder arthroscopy for a rotator cuff repair arthroscopic repair of massive three-tendon rotator cuff tear using suture anchor repair and collagen patch augmentation on 11/16/2021. Impression:  Encounter Diagnoses   Name Primary?  S/P rotator cuff surgery Yes    S/P arthroscopy of right shoulder        Office Procedures:  Orders Placed This Encounter   Procedures    [facility]     Referral Priority:   Routine     Referral Type:   Eval and Treat     Referral Reason:   Specialty Services Required     Requested Specialty:   Physical Therapy     Number of Visits Requested:   1       Treatment Plan:    Overall, Carmen Chaves is doing well. The pain is well-controlled. She was asked to discontinue her sling completely. We will continue to progress her in physical therapy. Patient was told that she is currently ahead of schedule at this point. However still work on her internal rotation in the back. All her questions were fully answered we will see her back in 4 weeks for a follow-up visit. 10:15 AM      Skylar Zacarias PA-C  Orthopaedic Sports Medicine Physician Assistant    During this examination, Skylar LYNCH PA-C, functioned as a scribe for Dr. Claudio Fry. This dictation was performed with a verbal recognition program (DRAGON) and it was checked for errors. It is possible that there are still dictated errors within this office note. If so, please bring any errors to my attention for an addendum. All efforts were made to ensure that this office note is accurate.  ____________  I, Dr. Claudio Fry, personally performed the services described in this documentation as described by Skylar Zacarias PA-C in my presence, and it is both accurate and complete. Tennille Faith MD, PhD  1/6/2022

## 2022-01-06 NOTE — LETTER
Physical Therapy Rehabilitation Referral    Patient Name:  Ulises Perez      YOB: 1955    Diagnosis:    1. S/P rotator cuff surgery    2. S/P arthroscopy of right shoulder        Precautions:     [x] Evaluate and Treat    Post Op Instructions:  [] Continuous passive motion (CPM)  [x] Elbow ROM  [x] Exercise in plane of scapula  [x]  Strengthening     [x] Pulley and instruction   [x] Home exercise program (copy to patient)   [] Sling when arm at risk  [] Sling or brace at all times   [x] AAROM: Forward elevation to  140            [x] AAROM: External rotation  To  40    [] Isometric external rotator strengthening [x] AAROM: internal rotation: up the back  [x] Isometric abductor strengthening  [x] AAROM: Internal abduction   [] Isometric internal rotator strengthening [x] AAROM: cross-body adduction             Stretching:     Strengthening:  [x] Four quadrant (FE, ER, IR, CBA)  [] Rotator cuff (ER, IR, Abd)  [] Forward Elevation    [] External Rotators     [] External Rotation    [] Internal Rotators  [] Internal Rotation: up/back   [] Abductors     [] Internal Rotation: supine in abduction  [] Sleeper Stretch    [] Flexors  [] Cross-body abduction    [] Extensors  [x] Pendulum (FE, Abd/Add, cw/ccw)  [x] Scapular Stabilizers   [x] Wall-walking (FE, Abd)        [x] Shoulder shrugs     [x] Table slides (FE)                [x] Rhomboid pinch  [] Elbow (flex, ext, pron, sup)        [] Lat.  Pull downs     [] Medial epicondylitis program       [] Forward punch   [] Lateral epicondylitis program       [] Internal rotators     [x] Progressive resistive exercises  [] Bench Press        [] Bench press plus  Activities:     [] Lateral pull-downs  [x] Rowing     [x] Progressive two-hand supine press  [] Stepper/Exercise bike   [x] Biceps: curls/supination  [] Swimming  [] Water exercises    Modalities:     Return to Sport:  [x] Of Choice      [] Plyometrics  [] Ultrasound     [] Rhythmic stabilization  [] Iontophoresis    [] Core strengthening   [] Moist heat     [] Sports specific program:   [] Massage         [x] Cryotherapy      [] Electrical stimulation     [] Paraffin  [] Whirlpool  [] TENS    [x] Home exercise program (copy to patient). Perform exercises for:   15     minutes    3      times/day  [x] Supervised physical therapy  Frequency: []  1x week  [x] 2x week  [] 3x week  [] Other:   Duration: [] 2 weeks   [] 4 weeks  [x] 6 weeks  [] Other:     Additional Instructions:     Tennille Segovia MD, PhD

## 2022-01-10 ENCOUNTER — HOSPITAL ENCOUNTER (OUTPATIENT)
Dept: PHYSICAL THERAPY | Age: 67
Setting detail: THERAPIES SERIES
Discharge: HOME | End: 2022-01-10
Payer: MEDICARE

## 2022-01-10 PROCEDURE — 97112 NEUROMUSCULAR REEDUCATION: CPT

## 2022-01-10 PROCEDURE — 97140 MANUAL THERAPY 1/> REGIONS: CPT

## 2022-01-10 PROCEDURE — 97110 THERAPEUTIC EXERCISES: CPT

## 2022-01-10 NOTE — FLOWSHEET NOTE
Physical Therapy Re-Certification Plan of Care/MD [address] [facility]  Outpatient Rehabilitation and Therapy, [address] [place] [address]  Phone: [phone]   Fax:     [phone]        Physical Therapy Treatment Note/ Progress Report:       Date:  1/10/2022    Patient Name:  Kendrick Hastings   \"Lisa\" :  1955  MRN: [id number]    Pertinent Medical History:     Medical/Treatment Diagnosis Information:  · Diagnosis: Repair massive tear with patch; delayed rehab; Right shoulder impingement, chronic rotatorcuff tear, biceps tendon tear with chronic biceps tendon tear, early osteoarthritis, massive three-tendon rotator cuff tear with thinning ofthe tendon. (G66.134)  · Treatment Diagnosis: Decreased ADL status    Insurance/Certification information:  PT [facility] Information: [facility]  Physician Information:  Referring Practitioner: Dr. Sandy Bonner of care signed (Y/N): Y    Date of Patient follow up with Physician:      Progress Report: []  Yes  [x]  No     Date Range for reporting period:  Beginnin21  Ending:      Progress report due (10 Rx/or 30 days whichever is less):      Recertification due (POC duration/ or 90 days whichever is less):      Visit # POC/Insurance Allowable Auth Needed   8 14 []Yes    []No     Pain level:  0/10     History of Injury:Patient stated she had surgery 21. Pt stated she has a lot of pain at times, but Percocet helps control pain. Pt stated she has had shoulder problems for \"a long time. \"  Pt stated she was instructed to wear her sling all the time. \"         SUBJECTIVE:  See eval   21 pt stated she was instructed to come to PT 1 session, wait 3 weeks, then return to PT for 1x/week for 3 weeks. 12/3/21 Pt stated she returned to MD last week and \"he said that you look like all my patients should look a week after my surgery. \"  \"It hurts bad. \"  Pt stated she refuses to use percocet because \"it makes me sick. \"  Pt stated she is not using CP often at home. PT recommended CP 20', 4x/day. 12/17/21 pt stated she was instructed she can stop wearing her sling unless she will be in a crowded area. 12/22/21 did \"great\" after last PT session, \"its doing really well actually\". HEP is going well. Pt has pain at the shoulder blade  12/29: Last week was really bad after therapy. Muscle spasms all through upper back and shoulders. Felt like she had a knot poking her  1/3/22 pt stated her spasms were relieved following last treatment  1/5/22 pt stated she does not have shoulder pain today, but she feels aches. Pt can use R UE to fix her hair. Because pain has been low she sometimes does not think about using the arm and it catches when she reaches  Pt stated she will see Dr. Bharat Hector tomorrow  1/10/22 pt stated she saw Dr. Bharat Hector who informed her that \"I am ahead of schedule\" and \"the rotator cuff is not yet healed. \"  Pt stated she is not allowed to reach out to the side with resistance. Pt stated she is allowed to reach behind her back. Pt stated the anterior shoulder is sore. Pt has not been using oral pain meds.     OBJECTIVE:    Observation:   Quick Dash: 11/19/21 49 raw; 86.4% dysfunction      Test measurements:    PROM     Right  11/19/21 Right  12/22/21 Right  1/5/22   Shoulder Flex  150   Shoulder Abd NT     Shoulder ER NT 40 60  (not pushing aggressively)   Shoulder IR NT                     Strength  Right     Shoulder Flex NT NT    Shoulder Scap NT NT    Shoulder ER NT NT    Shoulder IR NT NT        RESTRICTIONS/PRECAUTIONS: follow protocol, pt has delayed protocol from Dr. Bharat Hector    Exercises/Interventions:   Therapeutic Ex (93086)  Min: 15 Resistance/Repetitions CUES/Notes   Overhead pulley 30x    Table slides flexion 10x    Pendulum 30x cw, 30x ccw    Wall walking  Flexion  Scaption   10x flexion  5x    Supine wand  Flexion   ER up to 40 deg 20x  2x10              Isometric ABD vs wall Light, 10x    AAROM elbow flex/ext 10x         Manual Intervention  (89715)  Min: 10     STM R upper quarter R biceps tendon    Stretching   Flexion  ER  IR  CBA   1x30\"  1x30\"  1x30  1x30\"                   NMR re-education (54793)  Min: 20     Roll ball on table  Flexion Green ball  20x    Shrug 10x    Rhomboid pinch 10x    Isometric ABD vs wall 10x    Jerome 20x flexion    AAROM flexion to 140 10x2    AAROM ER to 40 10x2    AAROM up the back   10x  10x    AAROM internal abduction 10x    AAROM cross-body abduction 10x    Progressive 2-hand supine press Wand press, narrow  10x2       Begin the following  Wand press, wide   Wash cloth press  R unilateral press  Progressive tilt    strengthening     Biceps curls  Supination 0# 10x2  0# 10x2         Therapeutic Activity (91790)  Min:               Modalities:  Min     CP R shoulder Pt elected to do at home           Other Therapeutic Activities:  Pt was educated on PT POC, Diagnosis, Prognosis, pathomechanics as well as frequency and duration of scheduling future physical therapy appointments. Time was also taken on this day to answer all patient questions and participation in PT. Reviewed appointment policy in detail with patient and patient verbalized understanding. Home Exercise Program:  Access Code: [facility]  URL: [email or web address]  Date: 11/19/2021  Prepared by: Melonie Keller     Exercises  Seated Scapular Retraction - 3 x daily - 7 x weekly - 10 reps - 5 hold  Standing Shoulder Shrugs - 3 x daily - 7 x weekly - 10 reps - 5 hold  Isometric Shoulder Abduction at Wall - 3 x daily - 7 x weekly - 10 reps - 5 hold  Supported Elbow Flexion Extension PROM - 3 x daily - 7 x weekly - 10 reps    Access Code: [facility]  URL: [email or web address]  Date: 12/17/2021  Prepared by: Melonie Keller  Seated Scapular Retraction - 3 x daily - 7 x weekly - 10 reps - 5 hold  Standing Shoulder Shrugs - 3 x daily - 7 x weekly - 10 reps - 5 hold  Isometric Shoulder Abduction at Wall - 3 x daily - 7 x weekly - 10 reps - 5 hold  Supported Elbow Flexion Extension PROM - 3 x daily - 7 x weekly - 10 reps  Seated Shoulder Flexion Towel Slide at Table Top Full Range of Motion - 3 x daily - 7 x weekly - 10 reps - 5 hold  Circular Shoulder Pendulum with Table Support - 3 x daily - 7 x weekly - 10 reps  Supine Shoulder Flexion Extension AAROM with Dowel - 3 x daily - 7 x weekly - 10 reps         Therapeutic Exercise and NMR EXR  [] (14539) Provided verbal/tactile cueing for activities related to strengthening, flexibility, endurance, ROM  for improvements in scapular, scapulothoracic and UE control with self care, reaching, carrying, lifting, house/yardwork, driving/computer work.    [] (96113) Provided verbal/tactile cueing for activities related to improving balance, coordination, kinesthetic sense, posture, motor skill, proprioception  to assist with  scapular, scapulothoracic and UE control with self care, reaching, carrying, lifting, house/yardwork, driving/computer work. Therapeutic Activities:    [] (46762 or 53823) Provided verbal/tactile cueing for activities related to improving balance, coordination, kinesthetic sense, posture, motor skill, proprioception and motor activation to allow for proper function of scapular, scapulothoracic and UE control with self care, carrying, lifting, driving/computer work.      Home Exercise Program:    [x] (58314) Reviewed/Progressed HEP activities related to strengthening, flexibility, endurance, ROM of scapular, scapulothoracic and UE control with self care, reaching, carrying, lifting, house/yardwork, driving/computer work  [] (69471) Reviewed/Progressed HEP activities related to improving balance, coordination, kinesthetic sense, posture, motor skill, proprioception of scapular, scapulothoracic and UE control with self care, reaching, carrying, lifting, house/yardwork, driving/computer work      Manual Treatments:  PROM / STM / Oscillations-Mobs:  G-I, II, III, IV (PA's, Inf., Post.)  [] (14474) Provided manual therapy to mobilize soft tissue/joints of cervical/CT, scapular GHJ and UE for the purpose of modulating pain, promoting relaxation,  increasing ROM, reducing/eliminating soft tissue swelling/inflammation/restriction, improving soft tissue extensibility and allowing for proper ROM for normal function with self care, reaching, carrying, lifting, house/yardwork, driving/computer work    Charges:  Timed Code Treatment Minutes: 45   Total Treatment Minutes: 45       [] EVAL (LOW) 75224 (typically 20 minutes face-to-face)  [] EVAL (MOD) 35550 (typically 30 minutes face-to-face)  [] EVAL (HIGH) 16451 (typically 45 minutes face-to-face)  [] RE-EVAL     [x] EU(27550) x     [] Dry needle 1 or 2 Muscles (09154)  [x] NMR (63378) x     [] Dry needle 3+ Muscles (63039)  [x] Manual (52553) x     [] Ultrasound (59203) x  [] TA (41996) x     [] Mech Traction (95805)  [] ES(attended) (42225)     [] ES (un) (05752):   [] Vasopump (12579) [] Ionto (76484)   [] Other:        GOALS:  Patient stated goal: \"ability to move arm\"  []? Progressing: []? Met: []? Not Met: []? Adjusted     Therapist goals for Patient:   Short Term Goals: To be achieved in: 2 weeks  1. Independent in HEP and progression per patient tolerance, in order to prevent re-injury. []? Progressing: []? Met: []? Not Met: []? Adjusted  2. Patient will have a decrease in pain to facilitate improvement in movement, function, and ADLs as indicated by Functional Deficits. []? Progressing: []? Met: []? Not Met: []? Adjusted     Long Term Goals: To be achieved in: 12 weeks  1. Disability index score of 35% or less for the Quick DASH to assist with reaching prior level of function. []? Progressing: []? Met: []? Not Met: []? Adjusted  2.  Patient will demonstrate increased AROM to 140 degrees to allow for proper joint functioning as indicated by Functional Deficits. []? Progressing: []? Met: []? Not Met: []? Adjusted  3. Patient will demonstrate an increase in NM recruitment/activation and overall GH and scapular strength to within n5lbs HHD or WNL for proper functional mobility as indicated by patients Functional Deficits. []? Progressing: []? Met: []? Not Met: []? Adjusted  4. Patient will return to house cleaning activities without increased symptoms or restriction. []? Progressing: []? Met: []? Not Met: []? Adjusted  5. Patient will be able to sleep at night without waking due to pain. []? Progressing: []? Met: []? Not Met: []? Adjusted    ASSESSMENT:  See eval    Treatment/Activity Tolerance:  [x] Patient tolerated treatment well [] Patient limited by fatique  [] Patient limited by pain  [] Patient limited by other medical complications  [] Other:     Overall Progression Towards Functional goals/ Treatment Progress Update:  [] Patient is progressing as expected towards functional goals listed. [] Progression is slowed due to complexities/Impairments listed. [] Progression has been slowed due to co-morbidities. [x] Plan just implemented, too soon to assess goals progression <30days   [] Goals require adjustment due to lack of progress  [] Patient is not progressing as expected and requires additional follow up with physician  [] Other    Prognosis for POC: [x] Good [] Fair  [] Poor    Patient requires continued skilled intervention: [x] Yes  [] No      PLAN: Follow protocol; reassess  [x] Continue per plan of care [] Alter current plan (see comments)  [] Plan of care initiated [] Hold pending MD visit [] Discharge    Electronically signed by: Kermit Freire PT     Note: If patient does not return for scheduled/recommended follow up visits, this note will serve as a discharge from care along with the most recent update on progress.

## 2022-01-12 ENCOUNTER — HOSPITAL ENCOUNTER (OUTPATIENT)
Dept: PHYSICAL THERAPY | Age: 67
Setting detail: THERAPIES SERIES
Discharge: HOME | End: 2022-01-12
Payer: MEDICARE

## 2022-01-12 DIAGNOSIS — Z98.890 S/P ROTATOR CUFF SURGERY: Primary | ICD-10-CM

## 2022-01-12 PROCEDURE — 97110 THERAPEUTIC EXERCISES: CPT

## 2022-01-12 PROCEDURE — 97140 MANUAL THERAPY 1/> REGIONS: CPT

## 2022-01-12 PROCEDURE — 97112 NEUROMUSCULAR REEDUCATION: CPT

## 2022-01-12 RX ORDER — TRAMADOL HYDROCHLORIDE 50 MG/1
50 TABLET ORAL 2 TIMES DAILY PRN
Qty: 28 TABLET | Refills: 0 | Status: SHIPPED | OUTPATIENT
Start: 2022-01-12 | End: 2022-01-19

## 2022-01-12 NOTE — FLOWSHEET NOTE
Physical Therapy Re-Certification Plan of Care/MD [address] [facility]  Outpatient Rehabilitation and Therapy, [address] [facility] [place] [address]  Phone: [phone]   Fax:     [phone]        Physical Therapy Treatment Note/ Progress Report:       Date:  2022    Patient Name:  Carlota Belle   \"Lisa\" :  1955  MRN: [id number]    Pertinent Medical History:     Medical/Treatment Diagnosis Information:  · Diagnosis: Repair massive tear with patch; delayed rehab; Right shoulder impingement, chronic rotator cuff tear, biceps tendon tear with chronic biceps tendon tear, early osteoarthritis, massive three-tendon rotator cuff tear with thinning ofthe tendon. (A38.954)  · Treatment Diagnosis: Decreased ADL status    Insurance/Certification information:  PT Insurance Information: Medicare/[employer]  Physician Information:  Referring Practitioner: Dr. Gin Florez of care signed (Y/N): Y    Date of Patient follow up with Physician:      Progress Report: []  Yes  [x]  No     Date Range for reporting period:  Beginnin21  Ending:      Progress report due (10 Rx/or 30 days whichever is less):      Recertification due (POC duration/ or 90 days whichever is less):      Visit # POC/Insurance Allowable Auth Needed   9 14 []Yes    []No     Pain level:  0/10     History of Injury:Patient stated she had surgery 21. Pt stated she has a lot of pain at times, but Percocet helps control pain. Pt stated she has had shoulder problems for \"a long time. \"  Pt stated she was instructed to wear her sling all the time. \"         SUBJECTIVE:  See eval   21 pt stated she was instructed to come to PT 1 session, wait 3 weeks, then return to PT for 1x/week for 3 weeks. 12/3/21 Pt stated she returned to MD last week and \"he said that you look like all my patients should look a week after my surgery. \"  \"It hurts bad. \"  Pt stated she refuses to use percocet because \"it makes me sick. \"  Pt stated she is not using CP often at home. PT recommended CP 20', 4x/day. 12/17/21 pt stated she was instructed she can stop wearing her sling unless she will be in a crowded area. 12/22/21 did \"great\" after last PT session, \"its doing really well actually\". HEP is going well. Pt has pain at the shoulder blade  12/29: Last week was really bad after therapy. Muscle spasms all through upper back and shoulders. Felt like she had a knot poking her  1/3/22 pt stated her spasms were relieved following last treatment  1/5/22 pt stated she does not have shoulder pain today, but she feels aches. Pt can use R UE to fix her hair. Because pain has been low she sometimes does not think about using the arm and it catches when she reaches  Pt stated she will see Dr. Anita Durán tomorrow  1/10/22 pt stated she saw Dr. Anita Durán who informed her that \"I am ahead of schedule\" and \"the rotator cuff is not yet healed. \"  Pt stated she is not allowed to reach out to the side with resistance. Pt stated she is allowed to reach behind her back. Pt stated the anterior shoulder is sore. Pt has not been using oral pain meds.   1/12/22 c/o R upper arm pain and elbow pain \"like a bad toothache\"- sore from chopping ingredients for [employer]     OBJECTIVE:    Observation:   Quick Dash: 11/19/21 49 raw; 86.4% dysfunction; 1/12/22 32 raw, 47.7% dysfunction      Test measurements:    PROM     Right  11/19/21 Right  12/22/21 Right  1/5/22   Shoulder Flex  150   Shoulder Abd NT     Shoulder ER NT 40 60  (not pushing aggressively)   Shoulder IR NT                     Strength  Right     Shoulder Flex NT NT    Shoulder Scap NT NT    Shoulder ER NT NT    Shoulder IR NT NT        RESTRICTIONS/PRECAUTIONS: follow protocol, pt has delayed protocol from Dr. Anita Durán    Exercises/Interventions:   Therapeutic Ex (83253)  Min: 15 Resistance/Repetitions CUES/Notes   Overhead pulley 30x Table slides flexion 10x    Pendulum 30x cw, 30x ccw    Wall walking  Flexion  Scaption   10x flexion  7x    Supine wand  Flexion   ER up to 40 deg   20x  2x10    IR behind back stretch with towel          Isometric ABD vs wall Light, 10x    AAROM elbow flex/ext 10x         Manual Intervention  (25966)  Min: 10     STM R upper quarter R biceps tendon/pec    Stretching   Flexion  ER  IR  CBA   1x30\"  1x30\"  1x30  1x30\"                   NMR re-education (54397)  Min: 20     Roll ball on table  Flexion Green ball  20x    Shrug 10x    Rhomboid pinch 10x    Isometric ABD vs wall 10x    [place] 20x flexion    AAROM flexion to 140 10x2    AAROM ER to 40 10x2    AAROM up the back   10x  10x    AAROM internal abduction 10x    AAROM cross-body abduction 10x    Progressive 2-hand supine press Wand press, narrow  10x2    Wand press, wide  10x1       Begin the following  Wash cloth press  R unilateral press  Progressive tilt    strengthening     Biceps curls  Supination 1# 10x2  1# 10x2         Therapeutic Activity (87465)  Min:               Modalities:  Min     CP R shoulder Pt elected to do at home           Other Therapeutic Activities:  Pt was educated on PT POC, Diagnosis, Prognosis, pathomechanics as well as frequency and duration of scheduling future physical therapy appointments. Time was also taken on this day to answer all patient questions and participation in PT. Reviewed appointment policy in detail with patient and patient verbalized understanding. Home Exercise Program:  Access Code: [facility]  URL: [email or web address]  Date: 11/19/2021  Prepared by: Amanda Warren     Exercises  Seated Scapular Retraction - 3 x daily - 7 x weekly - 10 reps - 5 hold  Standing Shoulder Shrugs - 3 x daily - 7 x weekly - 10 reps - 5 hold  Isometric Shoulder Abduction at Wall - 3 x daily - 7 x weekly - 10 reps - 5 hold  Supported Elbow Flexion Extension PROM - 3 x daily - 7 x weekly - 10 reps    Access Code: [email or web address]  URL: [email or web address]  Date: 12/17/2021  Prepared by: Sheridan Salinas  Seated Scapular Retraction - 3 x daily - 7 x weekly - 10 reps - 5 hold  Standing Shoulder Shrugs - 3 x daily - 7 x weekly - 10 reps - 5 hold  Isometric Shoulder Abduction at Wall - 3 x daily - 7 x weekly - 10 reps - 5 hold  Supported Elbow Flexion Extension PROM - 3 x daily - 7 x weekly - 10 reps  Seated Shoulder Flexion Towel Slide at Table Top Full Range of Motion - 3 x daily - 7 x weekly - 10 reps - 5 hold  Circular Shoulder Pendulum with Table Support - 3 x daily - 7 x weekly - 10 reps  Supine Shoulder Flexion Extension AAROM with Dowel - 3 x daily - 7 x weekly - 10 reps         Therapeutic Exercise and NMR EXR  [] (40033) Provided verbal/tactile cueing for activities related to strengthening, flexibility, endurance, ROM  for improvements in scapular, scapulothoracic and UE control with self care, reaching, carrying, lifting, house/yardwork, driving/computer work.    [] (51120) Provided verbal/tactile cueing for activities related to improving balance, coordination, kinesthetic sense, posture, motor skill, proprioception  to assist with  scapular, scapulothoracic and UE control with self care, reaching, carrying, lifting, house/yardwork, driving/computer work. Therapeutic Activities:    [] (41784 or 32990) Provided verbal/tactile cueing for activities related to improving balance, coordination, kinesthetic sense, posture, motor skill, proprioception and motor activation to allow for proper function of scapular, scapulothoracic and UE control with self care, carrying, lifting, driving/computer work.      Home Exercise Program:    [x] (40456) Reviewed/Progressed HEP activities related to strengthening, flexibility, endurance, ROM of scapular, scapulothoracic and UE control with self care, reaching, carrying, lifting, house/yardwork, driving/computer work  [] (75818) Reviewed/Progressed HEP activities related to improving balance, coordination, kinesthetic sense, posture, motor skill, proprioception of scapular, scapulothoracic and UE control with self care, reaching, carrying, lifting, house/yardwork, driving/computer work      Manual Treatments:  PROM / STM / Oscillations-Mobs:  G-I, II, III, IV (PA's, Inf., Post.)  [] (10555) Provided manual therapy to mobilize soft tissue/joints of cervical/CT, scapular GHJ and UE for the purpose of modulating pain, promoting relaxation,  increasing ROM, reducing/eliminating soft tissue swelling/inflammation/restriction, improving soft tissue extensibility and allowing for proper ROM for normal function with self care, reaching, carrying, lifting, house/yardwork, driving/computer work    Charges:  Timed Code Treatment Minutes: 45   Total Treatment Minutes: 45       [] EVAL (LOW) 68350 (typically 20 minutes face-to-face)  [] EVAL (MOD) 59667 (typically 30 minutes face-to-face)  [] EVAL (HIGH) 97392 (typically 45 minutes face-to-face)  [] RE-EVAL     [x] YURI(67716) x     [] Dry needle 1 or 2 Muscles (50583)  [x] NMR (48656) x     [] Dry needle 3+ Muscles (29797)  [x] Manual (00544) x     [] Ultrasound (38814) x  [] TA (09885) x     [] Mech Traction (02257)  [] ES(attended) (58955)     [] ES (un) (44361):   [] Vasopump (97101) [] Ionto (03152)   [] Other:        GOALS:  Patient stated goal: \"ability to move arm\"  []? Progressing: []? Met: []? Not Met: []? Adjusted     Therapist goals for Patient:   Short Term Goals: To be achieved in: 2 weeks  1. Independent in HEP and progression per patient tolerance, in order to prevent re-injury. []? Progressing: []? Met: []? Not Met: []? Adjusted  2. Patient will have a decrease in pain to facilitate improvement in movement, function, and ADLs as indicated by Functional Deficits. []? Progressing: []? Met: []? Not Met: []? Adjusted     Long Term Goals: To be achieved in: 12 weeks  1.  Disability index score of 35% or less for the Quick DASH to assist with reaching prior level of function. []? Progressing: []? Met: []? Not Met: []? Adjusted  2. Patient will demonstrate increased AROM to 140 degrees to allow for proper joint functioning as indicated by Functional Deficits. []? Progressing: []? Met: []? Not Met: []? Adjusted  3. Patient will demonstrate an increase in NM recruitment/activation and overall GH and scapular strength to within n5lbs HHD or WNL for proper functional mobility as indicated by patients Functional Deficits. []? Progressing: []? Met: []? Not Met: []? Adjusted  4. Patient will return to house cleaning activities without increased symptoms or restriction. []? Progressing: []? Met: []? Not Met: []? Adjusted  5. Patient will be able to sleep at night without waking due to pain. []? Progressing: []? Met: []? Not Met: []? Adjusted    ASSESSMENT:  See eval    Treatment/Activity Tolerance:  [x] Patient tolerated treatment well [] Patient limited by fatique  [] Patient limited by pain  [] Patient limited by other medical complications  [] Other:     Overall Progression Towards Functional goals/ Treatment Progress Update:  [] Patient is progressing as expected towards functional goals listed. [] Progression is slowed due to complexities/Impairments listed. [] Progression has been slowed due to co-morbidities.   [x] Plan just implemented, too soon to assess goals progression <30days   [] Goals require adjustment due to lack of progress  [] Patient is not progressing as expected and requires additional follow up with physician  [] Other    Prognosis for POC: [x] Good [] Fair  [] Poor    Patient requires continued skilled intervention: [x] Yes  [] No      PLAN: Follow protocol- begin wash cloth press if tolerated; progress HEP- wand press with narrow , wand shoulder flexion in supine, posterior capsule stretch  [x] Continue per plan of care [] Alter current plan (see comments)  [] Plan of care initiated [] Hold pending MD visit [] Discharge    Electronically signed by: Amada Oro PT     Note: If patient does not return for scheduled/recommended follow up visits, this note will serve as a discharge from care along with the most recent update on progress.

## 2022-01-17 ENCOUNTER — HOSPITAL ENCOUNTER (OUTPATIENT)
Dept: PHYSICAL THERAPY | Age: 67
Setting detail: THERAPIES SERIES
Discharge: HOME | End: 2022-01-17
Payer: MEDICARE

## 2022-01-17 PROCEDURE — 97112 NEUROMUSCULAR REEDUCATION: CPT

## 2022-01-17 PROCEDURE — 97110 THERAPEUTIC EXERCISES: CPT

## 2022-01-17 PROCEDURE — 97140 MANUAL THERAPY 1/> REGIONS: CPT

## 2022-01-17 NOTE — FLOWSHEET NOTE
Physical Therapy Re-Certification Plan of Care/MD [address] [facility]  Outpatient [facility] and Therapy, [address] [facility] [place] [address]  Phone: [phone]   Fax:     [phone]        Physical Therapy Treatment Note/ Progress Report:       Date:  2022    Patient Name:  Alexys José   \"Lisa\" :  1955  MRN: [id number]    Pertinent Medical History:     Medical/Treatment Diagnosis Information:  · Diagnosis: Repair massive tear with patch; delayed rehab; Right shoulder impingement, chronic rotator cuff tear, biceps tendon tear with chronic biceps tendon tear, early osteoarthritis, massive three-tendon rotator cuff tear with thinning ofthe tendon. (Z43.888)  · Treatment Diagnosis: Decreased ADL status    Insurance/Certification information:  PT Insurance Information: Medicare/[employer]  Physician Information:  Referring Practitioner: Dr. Altaf Schwarz of care signed (Y/N): Y    Date of Patient follow up with Physician:      Progress Report: []  Yes  [x]  No     Date Range for reporting period:  Beginnin21  Ending:      Progress report due (10 Rx/or 30 days whichever is less):      Recertification due (POC duration/ or 90 days whichever is less):      Visit # POC/Insurance Allowable Auth Needed   10 14 []Yes    []No     Pain level:  0/10     History of Injury:Patient stated she had surgery 21. Pt stated she has a lot of pain at times, but Percocet helps control pain. Pt stated she has had shoulder problems for \"a long time. \"  Pt stated she was instructed to wear her sling all the time. \"         SUBJECTIVE:  See eval   21 pt stated she was instructed to come to PT 1 session, wait 3 weeks, then return to PT for 1x/week for 3 weeks. 12/3/21 Pt stated she returned to MD last week and \"he said that you look like all my patients should look a week after my surgery. \"  \"It hurts bad. \"  Pt stated she refuses to use percocet because \"it makes me sick. \"  Pt stated she is not using CP often at home. PT recommended CP 20', 4x/day. 12/17/21 pt stated she was instructed she can stop wearing her sling unless she will be in a crowded area. 12/22/21 did \"great\" after last PT session, \"its doing really well actually\". HEP is going well. Pt has pain at the shoulder blade  12/29: Last week was really bad after therapy. Muscle spasms all through upper back and shoulders. Felt like she had a knot poking her  1/3/22 pt stated her spasms were relieved following last treatment  1/5/22 pt stated she does not have shoulder pain today, but she feels aches. Pt can use R UE to fix her hair. Because pain has been low she sometimes does not think about using the arm and it catches when she reaches  Pt stated she will see Dr. Macario Mortensen tomorrow  1/10/22 pt stated she saw Dr. Macario Mortensen who informed her that \"I am ahead of schedule\" and \"the rotator cuff is not yet healed. \"  Pt stated she is not allowed to reach out to the side with resistance. Pt stated she is allowed to reach behind her back. Pt stated the anterior shoulder is sore. Pt has not been using oral pain meds. 1/12/22 c/o R upper arm pain and elbow pain \"like a bad toothache\"- sore from chopping ingredients for [employer]   1/17: Still can't get arm up behind her back well.  Gets a lot of painful \"catching\" in shoulder    OBJECTIVE:    Observation:   Quick Dash: 11/19/21 49 raw; 86.4% dysfunction; 1/12/22 32 raw, 47.7% dysfunction      Test measurements:    PROM     Right  11/19/21 Right  12/22/21 Right  1/5/22   Shoulder Flex  150   Shoulder Abd NT     Shoulder ER NT 40 60  (not pushing aggressively)   Shoulder IR NT                     Strength  Right     Shoulder Flex NT NT    Shoulder Scap NT NT    Shoulder ER NT NT    Shoulder IR NT NT        RESTRICTIONS/PRECAUTIONS: follow protocol, pt has delayed protocol from  [facility]    Exercises/Interventions:   Therapeutic Ex (74525)  Min: 15 Resistance/Repetitions CUES/Notes   Overhead pulley 30x    Table slides flexion 10x    Pendulum 30x cw, 30x ccw    Wall walking  Flexion  Scaption   10x  10x    Supine wand  Flexion   ER up to 40 deg   20x  2x10    IR behind back stretch with towel   10x5\"                       Manual Intervention  (45893)  Min: 10     STM R upper quarter R biceps tendon/pec    Stretching   Flexion  ER  IR  CBA   1x30\"  1x30\"  1x30  1x30\"                   NMR re-education (95757)  Min: 20     Roll ball on table  Flexion  Scaption Green ball  30x  30x    Shrug  HEP   Rhomboid pinch  HEP   Isometric ABD vs wall 10x    [place] 20x flexion    AAROM flexion to 140 10x2    AAROM ER to 40 10x2    AAROM up the back   10x  10x    AAROM internal abduction 10x    AAROM cross-body abduction 10x    Progressive 2-hand supine press Wand press, narrow  10x2    Wand press, wide  10x2       Begin the following  Wash cloth press  R unilateral press  Progressive tilt    strengthening     Biceps curls  Supination 1# 10x2  1# 10x2         Therapeutic Activity (16101)  Min:               Modalities:  Min     CP R shoulder Pt elected to do at home           Other Therapeutic Activities:  Pt was educated on PT POC, Diagnosis, Prognosis, pathomechanics as well as frequency and duration of scheduling future physical therapy appointments. Time was also taken on this day to answer all patient questions and participation in PT. Reviewed appointment policy in detail with patient and patient verbalized understanding. Home Exercise Program:  Access Code: [facility]  URL: [email or web address]  Date: 11/19/2021  Prepared by: Judy Sportsman     Exercises  Seated Scapular Retraction - 3 x daily - 7 x weekly - 10 reps - 5 hold  Standing Shoulder Shrugs - 3 x daily - 7 x weekly - 10 reps - 5 hold  Isometric Shoulder Abduction at Wall - 3 x daily - 7 x weekly - 10 reps - 5 hold  Supported Elbow Flexion Extension PROM - 3 x daily - 7 x weekly - 10 reps    Access Code: [facility]  URL: [email or web address]  Date: 12/17/2021  Prepared by: Rondel Boast  Seated Scapular Retraction - 3 x daily - 7 x weekly - 10 reps - 5 hold  Standing Shoulder Shrugs - 3 x daily - 7 x weekly - 10 reps - 5 hold  Isometric Shoulder Abduction at Wall - 3 x daily - 7 x weekly - 10 reps - 5 hold  Supported Elbow Flexion Extension PROM - 3 x daily - 7 x weekly - 10 reps  Seated Shoulder Flexion Towel Slide at Table Top Full Range of Motion - 3 x daily - 7 x weekly - 10 reps - 5 hold  Circular Shoulder Pendulum with Table Support - 3 x daily - 7 x weekly - 10 reps  Supine Shoulder Flexion Extension AAROM with Dowel - 3 x daily - 7 x weekly - 10 reps         Therapeutic Exercise and NMR EXR  [] (58545) Provided verbal/tactile cueing for activities related to strengthening, flexibility, endurance, ROM  for improvements in scapular, scapulothoracic and UE control with self care, reaching, carrying, lifting, house/yardwork, driving/computer work.    [] (59863) Provided verbal/tactile cueing for activities related to improving balance, coordination, kinesthetic sense, posture, motor skill, proprioception  to assist with  scapular, scapulothoracic and UE control with self care, reaching, carrying, lifting, house/yardwork, driving/computer work. Therapeutic Activities:    [] (30239 or 49221) Provided verbal/tactile cueing for activities related to improving balance, coordination, kinesthetic sense, posture, motor skill, proprioception and motor activation to allow for proper function of scapular, scapulothoracic and UE control with self care, carrying, lifting, driving/computer work.      Home Exercise Program:    [x] (39716) Reviewed/Progressed HEP activities related to strengthening, flexibility, endurance, ROM of scapular, scapulothoracic and UE control with self care, reaching, carrying, lifting, be achieved in: 12 weeks  1. Disability index score of 35% or less for the Quick DASH to assist with reaching prior level of function. []? Progressing: []? Met: []? Not Met: []? Adjusted  2. Patient will demonstrate increased AROM to 140 degrees to allow for proper joint functioning as indicated by Functional Deficits. []? Progressing: []? Met: []? Not Met: []? Adjusted  3. Patient will demonstrate an increase in NM recruitment/activation and overall GH and scapular strength to within n5lbs HHD or WNL for proper functional mobility as indicated by patients Functional Deficits. []? Progressing: []? Met: []? Not Met: []? Adjusted  4. Patient will return to house cleaning activities without increased symptoms or restriction. []? Progressing: []? Met: []? Not Met: []? Adjusted  5. Patient will be able to sleep at night without waking due to pain. []? Progressing: []? Met: []? Not Met: []? Adjusted    ASSESSMENT:  See eval    Treatment/Activity Tolerance:  [x] Patient tolerated treatment well [] Patient limited by fatique  [] Patient limited by pain  [] Patient limited by other medical complications  [] Other:     Overall Progression Towards Functional goals/ Treatment Progress Update:  [] Patient is progressing as expected towards functional goals listed. [] Progression is slowed due to complexities/Impairments listed. [] Progression has been slowed due to co-morbidities.   [x] Plan just implemented, too soon to assess goals progression <30days   [] Goals require adjustment due to lack of progress  [] Patient is not progressing as expected and requires additional follow up with physician  [] Other    Prognosis for POC: [x] Good [] Fair  [] Poor    Patient requires continued skilled intervention: [x] Yes  [] No      PLAN: Follow protocol- begin wash cloth press if tolerated; progress HEP- wand press with narrow , wand shoulder flexion in supine, posterior capsule stretch  [x] Continue per plan of care [] Alter current plan (see comments)  [] Plan of care initiated [] Hold pending MD visit [] Discharge    Electronically signed by: Adebayo Oglesby PTA     Note: If patient does not return for scheduled/recommended follow up visits, this note will serve as a discharge from care along with the most recent update on progress.

## 2022-01-19 ENCOUNTER — HOSPITAL ENCOUNTER (OUTPATIENT)
Dept: PHYSICAL THERAPY | Age: 67
Setting detail: THERAPIES SERIES
Discharge: HOME | End: 2022-01-19
Payer: MEDICARE

## 2022-01-19 PROCEDURE — 97112 NEUROMUSCULAR REEDUCATION: CPT

## 2022-01-19 PROCEDURE — 97110 THERAPEUTIC EXERCISES: CPT

## 2022-01-19 PROCEDURE — 97140 MANUAL THERAPY 1/> REGIONS: CPT

## 2022-01-19 NOTE — FLOWSHEET NOTE
Physical Therapy Re-Certification Plan of Care/MD [address] [facility]  Outpatient Rehabilitation and Therapy, [place] [id number]  Phone: [phone]   Fax:     [phone]        Physical Therapy Treatment Note/ Progress Report:       Date:  2022    Patient Name:  Radha Hall   \"Lisa\" :  1955  MRN: [id number]    Pertinent Medical History:     Medical/Treatment Diagnosis Information:  · Diagnosis: Repair massive tear with patch; delayed rehab; Right shoulder impingement, chronic rotator cuff tear, biceps tendon tear with chronic biceps tendon tear, early osteoarthritis, massive three-tendon rotator cuff tear with thinning ofthe tendon. (Q02.855)  · Treatment Diagnosis: Decreased ADL status    Insurance/Certification information:  PT Insurance Information: Medicare/[employer]  Physician Information:  Referring Practitioner: Dr. Yanci Franklin of care signed (Y/N): Y    Date of Patient follow up with Physician:      Progress Report: []  Yes  [x]  No     Date Range for reporting period:  Beginnin21  Ending:      Progress report due (10 Rx/or 30 days whichever is less):      Recertification due (POC duration/ or 90 days whichever is less):      Visit # POC/Insurance Allowable Auth Needed   11 14 []Yes    []No     Pain level:  0/10     History of Injury:Patient stated she had surgery 21. Pt stated she has a lot of pain at times, but Percocet helps control pain. Pt stated she has had shoulder problems for \"a long time. \"  Pt stated she was instructed to wear her sling all the time. \"         SUBJECTIVE:  See eval   21 pt stated she was instructed to come to PT 1 session, wait 3 weeks, then return to PT for 1x/week for 3 weeks. 12/3/21 Pt stated she returned to MD last week and \"he said that you look like all my patients should look a week after my surgery. \"  \"It hurts bad. \"  Pt stated she refuses to use percocet because \"it makes me sick. \"  Pt stated she is not using CP often at home. PT recommended CP 20', 4x/day. 12/17/21 pt stated she was instructed she can stop wearing her sling unless she will be in a crowded area. 12/22/21 did \"great\" after last PT session, \"its doing really well actually\". HEP is going well. Pt has pain at the shoulder blade  12/29: Last week was really bad after therapy. Muscle spasms all through upper back and shoulders. Felt like she had a knot poking her  1/3/22 pt stated her spasms were relieved following last treatment  1/5/22 pt stated she does not have shoulder pain today, but she feels aches. Pt can use R UE to fix her hair. Because pain has been low she sometimes does not think about using the arm and it catches when she reaches  Pt stated she will see Dr. Giancarlo Gonzalez tomorrow  1/10/22 pt stated she saw Dr. Giancarlo Gonzalez who informed her that \"I am ahead of schedule\" and \"the rotator cuff is not yet healed. \"  Pt stated she is not allowed to reach out to the side with resistance. Pt stated she is allowed to reach behind her back. Pt stated the anterior shoulder is sore. Pt has not been using oral pain meds. 1/12/22 c/o R upper arm pain and elbow pain \"like a bad toothache\"- sore from chopping ingredients for [place]   1/17: Still can't get arm up behind her back well.  Gets a lot of painful \"catching\" in shoulder  1/19/22 Life is good, shoulder is sore, stiff reaching behind back    OBJECTIVE:    Observation:   Quick Dash: 11/19/21 49 raw; 86.4% dysfunction; 1/12/22 32 raw, 47.7% dysfunction      Test measurements:    PROM     Right  11/19/21 Right  12/22/21 Right  1/5/22 Right  1/19/22   Shoulder Flex  150 175   Shoulder Abd NT      Shoulder ER NT 40 60  (not pushing aggressively) 75   Shoulder IR NT       IR behind back     To T11            Strength  Right      Shoulder Flex NT NT     Shoulder Scap NT NT     Shoulder ER NT NT     Shoulder IR NT NT         RESTRICTIONS/PRECAUTIONS: follow protocol, pt has delayed protocol from Dr. Srikanth Salinas    Exercises/Interventions:   Therapeutic Ex (84888)  Min: 15 Resistance/Repetitions CUES/Notes   Overhead pulley  Flexion  IR behind back   30x  10x    Table slides flexion 10x    Pendulum 30x cw, 30x ccw    Wall walking  Flexion  Scaption   10x  10x    Supine wand  Flexion   ER up to 40 deg   20x  2x10    IR behind back stretch with towel   10x5\"                       Manual Intervention  (08467)  Min: 15     STM R upper quarter R biceps tendon/pec    Stretching   Flexion  ER  IR  CBA   1x30\"  1x30\"  1x30  1x30\"                   NMR re-education (27326)  Min: 20     Roll ball on table  Flexion  Scaption Green ball  30x  30x    Shrug  HEP   Rhomboid pinch  HEP   Isometric ABD vs wall 10x    Zenda 20x flexion    AAROM flexion to 140 10x2    AAROM ER to 40 10x2    AAROM up the back   10x  10x    AAROM internal abduction 10x    AAROM cross-body abduction 10x    Progressive 2-hand supine press Wand press, narrow  15x    Wand press, wide  15x  Press with wash cloth 15x  Press R unilateral 15x2       Begin the following  Wash cloth press  R unilateral press  Progressive tilt    strengthening     Biceps curls  Supination 1# 10x2; 2# 10x1  1# 10x2; 2# 10x1         Therapeutic Activity (79175)  Min:               Modalities:  Min     CP R shoulder Pt elected to do at home           Other Therapeutic Activities:  Pt was educated on PT POC, Diagnosis, Prognosis, pathomechanics as well as frequency and duration of scheduling future physical therapy appointments. Time was also taken on this day to answer all patient questions and participation in PT. Reviewed appointment policy in detail with patient and patient verbalized understanding. Home Exercise Program:  Access Code: [facility]  URL: [email or web address]  Date: 11/19/2021  Prepared by: Damon Ngo     Exercises  Seated Scapular Retraction - 3 x daily - 7 x weekly - 10 reps - 5 hold  Standing Shoulder Shrugs - 3 x daily - 7 x weekly - 10 reps - 5 hold  Isometric Shoulder Abduction at Wall - 3 x daily - 7 x weekly - 10 reps - 5 hold  Supported Elbow Flexion Extension PROM - 3 x daily - 7 x weekly - 10 reps    Access Code: [facility]  URL: [email or web address]  Date: 12/17/2021  Prepared by: Geri Ing  Seated Scapular Retraction - 3 x daily - 7 x weekly - 10 reps - 5 hold  Standing Shoulder Shrugs - 3 x daily - 7 x weekly - 10 reps - 5 hold  Isometric Shoulder Abduction at Wall - 3 x daily - 7 x weekly - 10 reps - 5 hold  Supported Elbow Flexion Extension PROM - 3 x daily - 7 x weekly - 10 reps  Seated Shoulder Flexion Towel Slide at Table Top Full Range of Motion - 3 x daily - 7 x weekly - 10 reps - 5 hold  Circular Shoulder Pendulum with Table Support - 3 x daily - 7 x weekly - 10 reps  Supine Shoulder Flexion Extension AAROM with Dowel - 3 x daily - 7 x weekly - 10 reps         Therapeutic Exercise and NMR EXR  [] (88244) Provided verbal/tactile cueing for activities related to strengthening, flexibility, endurance, ROM  for improvements in scapular, scapulothoracic and UE control with self care, reaching, carrying, lifting, house/yardwork, driving/computer work.    [] (17768) Provided verbal/tactile cueing for activities related to improving balance, coordination, kinesthetic sense, posture, motor skill, proprioception  to assist with  scapular, scapulothoracic and UE control with self care, reaching, carrying, lifting, house/yardwork, driving/computer work. Therapeutic Activities:    [] (18663 or 18283) Provided verbal/tactile cueing for activities related to improving balance, coordination, kinesthetic sense, posture, motor skill, proprioception and motor activation to allow for proper function of scapular, scapulothoracic and UE control with self care, carrying, lifting, driving/computer work.      Home Exercise Program:    [x] (27256) Reviewed/Progressed HEP activities related to strengthening, flexibility, endurance, ROM of scapular, scapulothoracic and UE control with self care, reaching, carrying, lifting, house/yardwork, driving/computer work  [] (92474) Reviewed/Progressed HEP activities related to improving balance, coordination, kinesthetic sense, posture, motor skill, proprioception of scapular, scapulothoracic and UE control with self care, reaching, carrying, lifting, house/yardwork, driving/computer work      Manual Treatments:  PROM / STM / Oscillations-Mobs:  G-I, II, III, IV (PA's, Inf., Post.)  [] (42959) Provided manual therapy to mobilize soft tissue/joints of cervical/CT, scapular GHJ and UE for the purpose of modulating pain, promoting relaxation,  increasing ROM, reducing/eliminating soft tissue swelling/inflammation/restriction, improving soft tissue extensibility and allowing for proper ROM for normal function with self care, reaching, carrying, lifting, house/yardwork, driving/computer work    Charges:  Timed Code Treatment Minutes: 45   Total Treatment Minutes: 45       [] EVAL (LOW) 55676 (typically 20 minutes face-to-face)  [] EVAL (MOD) 19985 (typically 30 minutes face-to-face)  [] EVAL (HIGH) 32763 (typically 45 minutes face-to-face)  [] RE-EVAL     [x] JW(64728) x     [] Dry needle 1 or 2 Muscles (14593)  [x] NMR (92137) x     [] Dry needle 3+ Muscles (39554)  [x] Manual (69572) x     [] Ultrasound (04194) x  [] TA (45241) x     [] Mech Traction (37023)  [] ES(attended) (63286)     [] ES (un) (97758):   [] Vasopump (91095) [] Ionto (89241)   [] Other:        GOALS:  Patient stated goal: \"ability to move arm\"  []? Progressing: []? Met: []? Not Met: []? Adjusted     Therapist goals for Patient:   Short Term Goals: To be achieved in: 2 weeks  1. Independent in HEP and progression per patient tolerance, in order to prevent re-injury. []? Progressing: []? Met: []? Not Met: []? Adjusted  2.  Patient will have a decrease in pain to facilitate improvement in movement, function, and ADLs as indicated by Functional Deficits. []? Progressing: []? Met: []? Not Met: []? Adjusted     Long Term Goals: To be achieved in: 12 weeks  1. Disability index score of 35% or less for the Quick DASH to assist with reaching prior level of function. []? Progressing: []? Met: []? Not Met: []? Adjusted  2. Patient will demonstrate increased AROM to 140 degrees to allow for proper joint functioning as indicated by Functional Deficits. []? Progressing: []? Met: []? Not Met: []? Adjusted  3. Patient will demonstrate an increase in NM recruitment/activation and overall GH and scapular strength to within n5lbs HHD or WNL for proper functional mobility as indicated by patients Functional Deficits. []? Progressing: []? Met: []? Not Met: []? Adjusted  4. Patient will return to house cleaning activities without increased symptoms or restriction. []? Progressing: []? Met: []? Not Met: []? Adjusted  5. Patient will be able to sleep at night without waking due to pain. []? Progressing: []? Met: []? Not Met: []? Adjusted    ASSESSMENT:  See eval    Treatment/Activity Tolerance:  [x] Patient tolerated treatment well [] Patient limited by fatique  [] Patient limited by pain  [] Patient limited by other medical complications  [] Other:     Overall Progression Towards Functional goals/ Treatment Progress Update:  [] Patient is progressing as expected towards functional goals listed. [] Progression is slowed due to complexities/Impairments listed. [] Progression has been slowed due to co-morbidities.   [x] Plan just implemented, too soon to assess goals progression <30days   [] Goals require adjustment due to lack of progress  [] Patient is not progressing as expected and requires additional follow up with physician  [] Other    Prognosis for POC: [x] Good [] Fair  [] Poor    Patient requires continued skilled intervention: [x] Yes  [] No      PLAN: Follow protocol- begin wash cloth press if tolerated; progress HEP- wand press with narrow , wand shoulder flexion in supine, posterior capsule stretch  [x] Continue per plan of care [] Alter current plan (see comments)  [] Plan of care initiated [] Hold pending MD visit [] Discharge    Electronically signed by: Maribell Baltazar PT     Note: If patient does not return for scheduled/recommended follow up visits, this note will serve as a discharge from care along with the most recent update on progress.

## 2022-01-24 ENCOUNTER — HOSPITAL ENCOUNTER (OUTPATIENT)
Dept: PHYSICAL THERAPY | Age: 67
Setting detail: THERAPIES SERIES
Discharge: HOME | End: 2022-01-24
Payer: MEDICARE

## 2022-01-24 PROCEDURE — 97140 MANUAL THERAPY 1/> REGIONS: CPT

## 2022-01-24 PROCEDURE — 97112 NEUROMUSCULAR REEDUCATION: CPT

## 2022-01-24 PROCEDURE — 97110 THERAPEUTIC EXERCISES: CPT

## 2022-01-24 NOTE — FLOWSHEET NOTE
Physical Therapy Re-Certification Plan of Care/MD [address] [place] [address], [place] [address]. Nigel Escalera [id number]  Phone: [phone]   Fax:     [phone]        Physical Therapy Treatment Note/ Progress Report:       Date:  2022    Patient Name:  Carlota Belle   \"Lisa\" :  1955  MRN: [id number]    Pertinent Medical History:     Medical/Treatment Diagnosis Information:  · Diagnosis: Repair massive tear with patch; delayed rehab; Right shoulder impingement, chronic rotator cuff tear, biceps tendon tear with chronic biceps tendon tear, early osteoarthritis, massive three-tendon rotator cuff tear with thinning ofthe tendon. (R97.718)  · Treatment Diagnosis: Decreased ADL status    Insurance/Certification information:  PT Insurance Information: Medicare/[employer]  Physician Information:  Referring Practitioner: Dr. Gin Florez of care signed (Y/N): Y    Date of Patient follow up with Physician:      Progress Report: []  Yes  [x]  No     Date Range for reporting period:  Beginnin21  Ending:      Progress report due (10 Rx/or 30 days whichever is less):      Recertification due (POC duration/ or 90 days whichever is less):      Visit # POC/Insurance Allowable Auth Needed   12 14 []Yes    []No     Pain level:  0/10     History of Injury:Patient stated she had surgery 21. Pt stated she has a lot of pain at times, but Percocet helps control pain. Pt stated she has had shoulder problems for \"a long time. \"  Pt stated she was instructed to wear her sling all the time. \"         SUBJECTIVE:  See eval   21 pt stated she was instructed to come to PT 1 session, wait 3 weeks, then return to PT for 1x/week for 3 weeks. 12/3/21 Pt stated she returned to MD last week and \"he said that you look like all my patients should look a week after my surgery. \"  \"It hurts bad. \"  Pt stated she refuses to use percocet because \"it makes me sick. \"  Pt stated she is not using CP often at home. PT recommended CP 20', 4x/day. 12/17/21 pt stated she was instructed she can stop wearing her sling unless she will be in a crowded area. 12/22/21 did \"great\" after last PT session, \"its doing really well actually\". HEP is going well. Pt has pain at the shoulder blade  12/29: Last week was really bad after therapy. Muscle spasms all through upper back and shoulders. Felt like she had a knot poking her  1/3/22 pt stated her spasms were relieved following last treatment  1/5/22 pt stated she does not have shoulder pain today, but she feels aches. Pt can use R UE to fix her hair. Because pain has been low she sometimes does not think about using the arm and it catches when she reaches  Pt stated she will see Dr. Duane Menjivar tomorrow  1/10/22 pt stated she saw Dr. Duane Menjivar who informed her that \"I am ahead of schedule\" and \"the rotator cuff is not yet healed. \"  Pt stated she is not allowed to reach out to the side with resistance. Pt stated she is allowed to reach behind her back. Pt stated the anterior shoulder is sore. Pt has not been using oral pain meds. 1/12/22 c/o R upper arm pain and elbow pain \"like a bad toothache\"- sore from chopping ingredients for [place]   1/17: Still can't get arm up behind her back well. Gets a lot of painful \"catching\" in shoulder  1/19/22 Life is good, shoulder is sore, stiff reaching behind back  1/24: Doing good except for behind her back.  Anterior shoulder is sore and it shoots down and around to posterior elbow    OBJECTIVE:    Observation:   Quick Dash: 11/19/21 49 raw; 86.4% dysfunction; 1/12/22 32 raw, 47.7% dysfunction      Test measurements:    PROM     Right  11/19/21 Right  12/22/21 Right  1/5/22 Right  1/19/22   Shoulder Flex  150 175   Shoulder Abd NT      Shoulder ER NT 40 60  (not pushing aggressively) 75   Shoulder IR NT       IR behind back     To T11            Strength  Right      Shoulder Flex NT NT     Shoulder Scap NT NT     Shoulder ER NT NT     Shoulder IR NT NT         RESTRICTIONS/PRECAUTIONS: follow protocol, pt has delayed protocol from Dr. Beni Adams    Exercises/Interventions:   Therapeutic Ex (03008)  Min: 15 Resistance/Repetitions CUES/Notes   Overhead pulley  Flexion  IR behind back   30x  10x    Table slides flexion 10x    Pendulum 30x cw, 30x ccw    Wall walking  Flexion  Scaption   10x  10x    Supine wand  Flexion   ER up to 40 deg   20x  2x10    IR behind back stretch with towel   10x5\"                       Manual Intervention  (66462)  Min: 15     STM R upper quarter R biceps tendon/pec    Stretching   Flexion  ER  IR  CBA   1x30\"  1x30\"  1x30  1x30\"                   NMR re-education (90602)  Min: 20     Roll ball on table  Flexion  Scaption Green ball  30x  30x    Shrug  HEP   Rhomboid pinch  HEP   Isometric ABD vs wall 10x    [place] 20x flexion    AAROM flexion to 140 10x2    AAROM ER to 40 10x2    AAROM up the back   10x  10x    AAROM internal abduction 10x    AAROM cross-body abduction 10x    Progressive 2-hand supine press Wand press, narrow  15x    Wand press, wide  15x  Press with wash cloth 15x  Press R unilateral 15x       Begin the following  Wash cloth press  R unilateral press  Progressive tilt    strengthening     Biceps curls  Supination 1# 10x2; 2# 10x1  1# 10x2; 2# 10x1         Therapeutic Activity (05585)  Min:               Modalities:  Min     CP R shoulder Pt elected to do at home           Other Therapeutic Activities:  Pt was educated on PT POC, Diagnosis, Prognosis, pathomechanics as well as frequency and duration of scheduling future physical therapy appointments. Time was also taken on this day to answer all patient questions and participation in PT. Reviewed appointment policy in detail with patient and patient verbalized understanding.      Home Exercise Program:  Access Code: [facility]  URL: [email or web address]  Date: 11/19/2021  Prepared by: Brodieeda Juan     Exercises  Seated Scapular Retraction - 3 x daily - 7 x weekly - 10 reps - 5 hold  Standing Shoulder Shrugs - 3 x daily - 7 x weekly - 10 reps - 5 hold  Isometric Shoulder Abduction at Wall - 3 x daily - 7 x weekly - 10 reps - 5 hold  Supported Elbow Flexion Extension PROM - 3 x daily - 7 x weekly - 10 reps    Access Code: [facility]  URL: [email or web address]  Date: 12/17/2021  Prepared by: Merceda Juan  Seated Scapular Retraction - 3 x daily - 7 x weekly - 10 reps - 5 hold  Standing Shoulder Shrugs - 3 x daily - 7 x weekly - 10 reps - 5 hold  Isometric Shoulder Abduction at Wall - 3 x daily - 7 x weekly - 10 reps - 5 hold  Supported Elbow Flexion Extension PROM - 3 x daily - 7 x weekly - 10 reps  Seated Shoulder Flexion Towel Slide at Table Top Full Range of Motion - 3 x daily - 7 x weekly - 10 reps - 5 hold  Circular Shoulder Pendulum with Table Support - 3 x daily - 7 x weekly - 10 reps  Supine Shoulder Flexion Extension AAROM with [place] - 3 x daily - 7 x weekly - 10 reps         Therapeutic Exercise and NMR EXR  [] (33720) Provided verbal/tactile cueing for activities related to strengthening, flexibility, endurance, ROM  for improvements in scapular, scapulothoracic and UE control with self care, reaching, carrying, lifting, house/yardwork, driving/computer work.    [] (22332) Provided verbal/tactile cueing for activities related to improving balance, coordination, kinesthetic sense, posture, motor skill, proprioception  to assist with  scapular, scapulothoracic and UE control with self care, reaching, carrying, lifting, house/yardwork, driving/computer work.     Therapeutic Activities:    [] (44499 or 23236) Provided verbal/tactile cueing for activities related to improving balance, coordination, kinesthetic sense, posture, motor skill, proprioception and motor activation to allow for proper function of scapular, scapulothoracic and UE control with self care, carrying, lifting, driving/computer work. Home Exercise Program:    [x] (59254) Reviewed/Progressed HEP activities related to strengthening, flexibility, endurance, ROM of scapular, scapulothoracic and UE control with self care, reaching, carrying, lifting, house/yardwork, driving/computer work  [] (42297) Reviewed/Progressed HEP activities related to improving balance, coordination, kinesthetic sense, posture, motor skill, proprioception of scapular, scapulothoracic and UE control with self care, reaching, carrying, lifting, house/yardwork, driving/computer work      Manual Treatments:  PROM / STM / Oscillations-Mobs:  G-I, II, III, IV (PA's, Inf., Post.)  [] (78662) Provided manual therapy to mobilize soft tissue/joints of cervical/CT, scapular GHJ and UE for the purpose of modulating pain, promoting relaxation,  increasing ROM, reducing/eliminating soft tissue swelling/inflammation/restriction, improving soft tissue extensibility and allowing for proper ROM for normal function with self care, reaching, carrying, lifting, house/yardwork, driving/computer work    Charges:  Timed Code Treatment Minutes: 50   Total Treatment Minutes: 50       [] EVAL (LOW) 53348 (typically 20 minutes face-to-face)  [] EVAL (MOD) 83841 (typically 30 minutes face-to-face)  [] EVAL (HIGH) 42434 (typically 45 minutes face-to-face)  [] RE-EVAL     [x] BJ(51193) x     [] Dry needle 1 or 2 Muscles (99983)  [x] NMR (54226) x     [] Dry needle 3+ Muscles (42396)  [x] Manual (94426) x     [] Ultrasound (10612) x  [] TA (93477) x     [] Mech Traction (03283)  [] ES(attended) (52597)     [] ES (un) (57575):   [] Vasopump (86303) [] Ionto (93859)   [] Other:        GOALS:  Patient stated goal: \"ability to move arm\"  []? Progressing: []? Met: []? Not Met: []? Adjusted     Therapist goals for Patient:   Short Term Goals: To be achieved in: 2 weeks  1.  Independent in HEP and progression per patient tolerance, in order to prevent re-injury. []? Progressing: []? Met: []? Not Met: []? Adjusted  2. Patient will have a decrease in pain to facilitate improvement in movement, function, and ADLs as indicated by Functional Deficits. []? Progressing: []? Met: []? Not Met: []? Adjusted     Long Term Goals: To be achieved in: 12 weeks  1. Disability index score of 35% or less for the Quick DASH to assist with reaching prior level of function. []? Progressing: []? Met: []? Not Met: []? Adjusted  2. Patient will demonstrate increased AROM to 140 degrees to allow for proper joint functioning as indicated by Functional Deficits. []? Progressing: []? Met: []? Not Met: []? Adjusted  3. Patient will demonstrate an increase in NM recruitment/activation and overall GH and scapular strength to within n5lbs HHD or WNL for proper functional mobility as indicated by patients Functional Deficits. []? Progressing: []? Met: []? Not Met: []? Adjusted  4. Patient will return to house cleaning activities without increased symptoms or restriction. []? Progressing: []? Met: []? Not Met: []? Adjusted  5. Patient will be able to sleep at night without waking due to pain. []? Progressing: []? Met: []? Not Met: []? Adjusted    ASSESSMENT:  See eval    Treatment/Activity Tolerance:  [x] Patient tolerated treatment well [] Patient limited by fatique  [] Patient limited by pain  [] Patient limited by other medical complications  [] Other:     Overall Progression Towards Functional goals/ Treatment Progress Update:  [] Patient is progressing as expected towards functional goals listed. [] Progression is slowed due to complexities/Impairments listed. [] Progression has been slowed due to co-morbidities.   [x] Plan just implemented, too soon to assess goals progression <30days   [] Goals require adjustment due to lack of progress  [] Patient is not progressing as expected and requires additional follow up with physician  [] Other    Prognosis for POC: [x] Good [] Fair  [] Poor    Patient requires continued skilled intervention: [x] Yes  [] No      PLAN: Follow protocol- begin wash cloth press if tolerated; progress HEP- wand press with narrow , wand shoulder flexion in supine, posterior capsule stretch  [x] Continue per plan of care [] Alter current plan (see comments)  [] Plan of care initiated [] Hold pending MD visit [] Discharge    Electronically signed by: Cheryl English PTA     Note: If patient does not return for scheduled/recommended follow up visits, this note will serve as a discharge from care along with the most recent update on progress.

## 2022-01-26 ENCOUNTER — HOSPITAL ENCOUNTER (OUTPATIENT)
Dept: PHYSICAL THERAPY | Age: 67
Setting detail: THERAPIES SERIES
Discharge: HOME | End: 2022-01-26
Payer: MEDICARE

## 2022-01-26 PROCEDURE — 97140 MANUAL THERAPY 1/> REGIONS: CPT

## 2022-01-26 PROCEDURE — 97112 NEUROMUSCULAR REEDUCATION: CPT

## 2022-01-26 PROCEDURE — 97110 THERAPEUTIC EXERCISES: CPT

## 2022-01-26 NOTE — FLOWSHEET NOTE
Physical Therapy Re-Certification Plan of Care/MD UPDATE        Physical Therapy Re-Certification Plan of Care/MD UPDATE      Dear Dr. Sarah Paul,    We had the pleasure of treating the following patient for physical therapy services at [address] [place] [address]. A summary of our findings can be found in the updated assessment below. This includes our plan of care. If you have any questions or concerns regarding these findings, please do not hesitate to contact me at the office phone number checked above. Thank you for the referral.     Physician Signature:________________________________Date:__________________  By signing above (or electronic signature), therapists plan is approved by physician    Date Range Of Visits: 21-22  Total Visits to Date: 15  Overall Response to Treatment:   [x]Patient is responding well to treatment and improvement is noted with regards  to goals   []Patient should continue to improve in reasonable time if they continue HEP   []Patient has plateaued and is no longer responding to skilled PT intervention    []Patient is getting worse and would benefit from return to referring MD   []Patient unable to adhere to initial POC   [x]Other: pt has good ROM. Pt has low to moderate level pain. PT is following treatment protocol. Recommendation:    [x]Continue PT 1-2x / wk for 4 weeks. []Hold PT, pending MD visit                                                        [address] and [facility], [facility] [address]  [place] [address]  Phone: [phone]   Fax:     [phone]        Physical Therapy Treatment Note/ Progress Report:       Date:  2022    Patient Name:  Patricio Griffin   \"Lisa\" :  1955  MRN: [id number]    Pertinent Medical History:     Medical/Treatment Diagnosis Information:  · Diagnosis: Repair massive tear with patch; delayed rehab; Right shoulder impingement, chronic rotator cuff tear, biceps tendon tear with chronic biceps tendon tear, early osteoarthritis, massive three-tendon rotator cuff tear with thinning ofthe tendon. (M75.120)  · Treatment Diagnosis: Decreased ADL status    Insurance/Certification information:  PT Insurance Information: Medicare/[facility]  Physician Information:  Referring Practitioner: Dr. Greg Manley of care signed (Y/N): Y    Date of Patient follow up with Physician:      Progress Report: []  Yes  [x]  No     Date Range for reporting period:  Beginnin21  Ending:      Progress report due (10 Rx/or 30 days whichever is less):      Recertification due (POC duration/ or 90 days whichever is less):      Visit # POC/Insurance Allowable Auth Needed   13 14 []Yes    []No     Pain level:  0/10     History of Injury:Patient stated she had surgery 21. Pt stated she has a lot of pain at times, but Percocet helps control pain. Pt stated she has had shoulder problems for \"a long time. \"  Pt stated she was instructed to wear her sling all the time. \"         SUBJECTIVE:  See eval   21 pt stated she was instructed to come to PT 1 session, wait 3 weeks, then return to PT for 1x/week for 3 weeks. 12/3/21 Pt stated she returned to MD last week and \"he said that you look like all my patients should look a week after my surgery. \"  \"It hurts bad. \"  Pt stated she refuses to use percocet because \"it makes me sick. \"  Pt stated she is not using CP often at home. PT recommended CP 20', 4x/day. 21 pt stated she was instructed she can stop wearing her sling unless she will be in a crowded area. 21 did \"great\" after last PT session, \"its doing really well actually\". HEP is going well. Pt has pain at the shoulder blade  : Last week was really bad after therapy. Muscle spasms all through upper back and shoulders.  Felt like she had a knot poking her  1/3/22 pt stated her spasms were relieved following last treatment  22 pt stated she does not have shoulder pain today, but she feels aches. Pt can use R UE to fix her hair. Because pain has been low she sometimes does not think about using the arm and it catches when she reaches  Pt stated she will see Dr. Hang Stephen tomorrow  1/10/22 pt stated she saw Dr. Hang Stephen who informed her that \"I am ahead of schedule\" and \"the rotator cuff is not yet healed. \"  Pt stated she is not allowed to reach out to the side with resistance. Pt stated she is allowed to reach behind her back. Pt stated the anterior shoulder is sore. Pt has not been using oral pain meds. 1/12/22 c/o R upper arm pain and elbow pain \"like a bad toothache\"- sore from chopping ingredients for [place]   1/17: Still can't get arm up behind her back well. Gets a lot of painful \"catching\" in shoulder  1/19/22 Life is good, shoulder is sore, stiff reaching behind back  1/24: Doing good except for behind her back.  Anterior shoulder is sore and it shoots down and around to posterior elbow  1/26/22 pt c/o stiffness when reaching behind the back    OBJECTIVE:    Observation:   Quick Dash: 11/19/21 49 raw; 86.4% dysfunction; 1/12/22 32 raw, 47.7% dysfunction      Test measurements:    PROM     Right  11/19/21 Right  12/22/21 Right  1/5/22 Right  1/19/22 Right  1/26/22   Shoulder Flex  150 175 175   Shoulder Abd NT       Shoulder ER NT 40 60  (not pushing aggressively) 75 80   Shoulder IR NT        IR behind back     To T11              Strength  Right       Shoulder Flex NT NT      Shoulder Scap NT NT      Shoulder ER NT NT      Shoulder IR NT NT          RESTRICTIONS/PRECAUTIONS: follow protocol, pt has delayed protocol from Dr. Hang Stephen    Exercises/Interventions:   Therapeutic Ex (61669)  Min: 15 Resistance/Repetitions CUES/Notes   Overhead pulley  Flexion  IR behind back   30x  10x    Table slides flexion 10x    Pendulum 30x cw, 30x ccw    Wall walking  Flexion  Scaption   10x  10x    Supine wand  Flexion   ER up to 40 deg   20x  2x10    IR behind back stretch with towel   10x5\"                       Manual Intervention  ([id number])  Min: 15     STM R upper quarter R biceps tendon/pec    Stretching   Flexion  ER  IR  CBA   1x30\"  1x30\"  1x30  1x30\"                   NMR re-education (89398)  Min: 20     Roll ball on table  Flexion  Scaption Green ball  30x  30x    Shrug  HEP   Rhomboid pinch  HEP   Isometric ABD vs wall 10x    [place] 20x flexion    AAROM flexion to 140 10x2    AAROM ER to 40 10x2    AAROM up the back   10x  10x    AAROM internal abduction 10x    AAROM cross-body abduction 10x    Progressive 2-hand supine press Wand press, narrow  15x  Wand press, wide  15x  Press with wash cloth 15x  Press R unilateral 15x       Begin the following  Wash cloth press  R unilateral press  Progressive tilt    strengthening     Biceps curls  Supination 1# 10x2; 2# 10x1  1# 10x2; 2# 10x1         Therapeutic Activity (71868)  Min:               Modalities:  Min     CP R shoulder Pt elected to do at home           Other Therapeutic Activities:  Pt was educated on PT POC, Diagnosis, Prognosis, pathomechanics as well as frequency and duration of scheduling future physical therapy appointments. Time was also taken on this day to answer all patient questions and participation in PT. Reviewed appointment policy in detail with patient and patient verbalized understanding. Home Exercise Program:    Access Code: [facility]  URL: [email or web address]  Date: 01/26/2022  Prepared by: Shelly Newell    Exercises  Seated Scapular Retraction - 3 x daily - 7 x weekly - 10 reps - 5 hold  Standing Shoulder Shrugs - 3 x daily - 7 x weekly - 10 reps - 5 hold  Isometric Shoulder Abduction at Wall - 3 x daily - 7 x weekly - 10 reps - 5 hold  Supported Elbow Flexion Extension PROM - 3 x daily - 7 x weekly - 10 reps  Seated Shoulder Flexion Towel Slide at Table Top Full Range of Motion - 3 x daily - 7 x weekly - 10 reps - 5 hold  Circular Shoulder Pendulum with Table Support - 3 x daily - 7 x weekly - 10 reps  Supine Shoulder Flexion Extension AAROM with Dowel - 3 x daily - 7 x weekly - 10 reps  Supine Shoulder Press AAROM in Abduction with Dowel - 3 x daily - 7 x weekly - 2 sets - 10 reps  Supine Shoulder Press - 7 x weekly - 1 sets - 10 reps  Standing Shoulder Posterior Capsule Stretch - 3 x daily - 7 x weekly - 1 reps - 30 hold  Supine Shoulder Internal Rotation - 3 x daily - 7 x weekly - 10 reps  Standing Shoulder Internal Rotation Stretch with Towel - 3 x daily - 7 x weekly - 5 reps - 5 hold           Therapeutic Exercise and NMR EXR  [] (17217) Provided verbal/tactile cueing for activities related to strengthening, flexibility, endurance, ROM  for improvements in scapular, scapulothoracic and UE control with self care, reaching, carrying, lifting, house/yardwork, driving/computer work.    [] (79354) Provided verbal/tactile cueing for activities related to improving balance, coordination, kinesthetic sense, posture, motor skill, proprioception  to assist with  scapular, scapulothoracic and UE control with self care, reaching, carrying, lifting, house/yardwork, driving/computer work. Therapeutic Activities:    [] (49787 or 39478) Provided verbal/tactile cueing for activities related to improving balance, coordination, kinesthetic sense, posture, motor skill, proprioception and motor activation to allow for proper function of scapular, scapulothoracic and UE control with self care, carrying, lifting, driving/computer work.      Home Exercise Program:    [x] (24368) Reviewed/Progressed HEP activities related to strengthening, flexibility, endurance, ROM of scapular, scapulothoracic and UE control with self care, reaching, carrying, lifting, house/yardwork, driving/computer work  [] (46924) Reviewed/Progressed HEP activities related to improving balance, coordination, kinesthetic sense, posture, motor skill, proprioception of scapular, scapulothoracic and UE control with self care, reaching, carrying, lifting, house/yardwork, driving/computer work      Manual Treatments:  PROM / STM / Oscillations-Mobs:  G-I, II, III, IV (PA's, Inf., Post.)  [] (88334) Provided manual therapy to mobilize soft tissue/joints of cervical/CT, scapular GHJ and UE for the purpose of modulating pain, promoting relaxation,  increasing ROM, reducing/eliminating soft tissue swelling/inflammation/restriction, improving soft tissue extensibility and allowing for proper ROM for normal function with self care, reaching, carrying, lifting, house/yardwork, driving/computer work    Charges:  Timed Code Treatment Minutes: 50   Total Treatment Minutes: 50       [] EVAL (LOW) 29847 (typically 20 minutes face-to-face)  [] EVAL (MOD) 61371 (typically 30 minutes face-to-face)  [] EVAL (HIGH) 18936 (typically 45 minutes face-to-face)  [] RE-EVAL     [x] UK(95792) x     [] Dry needle 1 or 2 Muscles (97336)  [x] NMR (70725) x     [] Dry needle 3+ Muscles (65701)  [x] Manual (71435) x     [] Ultrasound (13478) x  [] TA (69257) x     [] Mech Traction (89113)  [] ES(attended) (29764)     [] ES (un) (15281):   [] Vasopump (35738) [] Ionto (17642)   [] Other:        GOALS:  Patient stated goal: \"ability to move arm\"  []? Progressing: []? Met: []? Not Met: []? Adjusted     Therapist goals for Patient:   Short Term Goals: To be achieved in: 2 weeks  1. Independent in HEP and progression per patient tolerance, in order to prevent re-injury. []? Progressing: []? Met: []? Not Met: []? Adjusted  2. Patient will have a decrease in pain to facilitate improvement in movement, function, and ADLs as indicated by Functional Deficits. []? Progressing: []? Met: []? Not Met: []? Adjusted     Long Term Goals: To be achieved in: 12 weeks  1. Disability index score of 35% or less for the Quick DASH to assist with reaching prior level of function. []? Progressing: []? Met: []? Not Met: []? Adjusted  2.  Patient will demonstrate increased AROM to 140 degrees to allow for proper joint functioning as indicated by Functional Deficits. []? Progressing: []? Met: []? Not Met: []? Adjusted  3. Patient will demonstrate an increase in NM recruitment/activation and overall GH and scapular strength to within n5lbs HHD or WNL for proper functional mobility as indicated by patients Functional Deficits. []? Progressing: []? Met: []? Not Met: []? Adjusted  4. Patient will return to house cleaning activities without increased symptoms or restriction. []? Progressing: []? Met: []? Not Met: []? Adjusted  5. Patient will be able to sleep at night without waking due to pain. []? Progressing: []? Met: []? Not Met: []? Adjusted    ASSESSMENT:  See eval    Treatment/Activity Tolerance:  [x] Patient tolerated treatment well [] Patient limited by fatique  [] Patient limited by pain  [] Patient limited by other medical complications  [] Other:     Overall Progression Towards Functional goals/ Treatment Progress Update:  [] Patient is progressing as expected towards functional goals listed. [] Progression is slowed due to complexities/Impairments listed. [] Progression has been slowed due to co-morbidities.   [x] Plan just implemented, too soon to assess goals progression <30days   [] Goals require adjustment due to lack of progress  [] Patient is not progressing as expected and requires additional follow up with physician  [] Other    Prognosis for POC: [x] Good [] Fair  [] Poor    Patient requires continued skilled intervention: [x] Yes  [] No      PLAN: Follow protocol- begin wash cloth press if tolerated; progress HEP- wand press with narrow , wand shoulder flexion in supine, posterior capsule stretch  [x] Continue per plan of care [] Alter current plan (see comments)  [] Plan of care initiated [] Hold pending MD visit [] Discharge    Electronically signed by: Paul Hartley PT     Note: If patient does not return for scheduled/recommended follow up visits, this note will serve as a discharge from care along with the most recent update on progress.

## 2022-02-02 ENCOUNTER — HOSPITAL ENCOUNTER (OUTPATIENT)
Dept: PHYSICAL THERAPY | Age: 67
Setting detail: THERAPIES SERIES
Discharge: HOME | End: 2022-02-02
Payer: MEDICARE

## 2022-02-02 NOTE — FLOWSHEET NOTE
[facility], [facility] [address] [place] [address]  Phone: [phone]   Fax:     [phone]     Physical Therapy  Cancellation/No-show Note  Patient Name:  Ulises Perez  :  1955   Date:  2022  Cancelled visits to date: 0  No-shows to date: 0    Patient status for today's appointment patient:  []  Cancelled  []  Rescheduled appointment  []  No-show     Reason given by patient:  []  Patient ill  []  Conflicting appointment  []  No transportation    []  Conflict with work  []  No reason given  []  Other:     Comments:  Pt did not arrive for PT appointment. Office called pt, pt stated she asked previously to be removed from the schedule today.     Phone call information:   []  Phone call made today to patient at _ time at number provided:      []  Patient answered, conversation as follows:    []  Patient did not answer, message left as follows:  []  Phone call not made today    Electronically signed by:  Alireza Purcell, PT

## 2022-02-09 ENCOUNTER — HOSPITAL ENCOUNTER (OUTPATIENT)
Dept: PHYSICAL THERAPY | Age: 67
Setting detail: THERAPIES SERIES
Discharge: HOME | End: 2022-02-09
Payer: MEDICARE

## 2022-02-09 PROCEDURE — 97112 NEUROMUSCULAR REEDUCATION: CPT

## 2022-02-09 PROCEDURE — 97110 THERAPEUTIC EXERCISES: CPT

## 2022-02-09 PROCEDURE — 97140 MANUAL THERAPY 1/> REGIONS: CPT

## 2022-02-09 NOTE — FLOWSHEET NOTE
[place] [facility] [place] [address]. [place] [address]  Phone: [phone]   Fax:     [phone]        Physical Therapy Treatment Note/ Progress Report:       Date:  2022    Patient Name:  Gibsno Cates   \"Lisa\" :  1955  MRN: [id number]    Pertinent Medical History:     Medical/Treatment Diagnosis Information:  · Diagnosis: Repair massive tear with patch; delayed rehab; Right shoulder impingement, chronic rotator cuff tear, biceps tendon tear with chronic biceps tendon tear, early osteoarthritis, massive three-tendon rotator cuff tear with thinning ofthe tendon. (F28.663)  · Treatment Diagnosis: Decreased ADL status    Insurance/Certification information:  PT Insurance Information: Medicare/[employer]  Physician Information:  Referring Practitioner: Dr. Lainey Hogan of care signed (Y/N): Y    Date of Patient follow up with Physician:      Progress Report: []  Yes  [x]  No     Date Range for reporting period:  Beginnin21  Ending:      Progress report due (10 Rx/or 30 days whichever is less):      Recertification due (POC duration/ or 90 days whichever is less):      Visit # POC/Insurance Allowable Auth Needed   14 22 []Yes    []No     Pain level:  0/10     History of Injury:Patient stated she had surgery 21. Pt stated she has a lot of pain at times, but Percocet helps control pain. Pt stated she has had shoulder problems for \"a long time. \"  Pt stated she was instructed to wear her sling all the time. \"         SUBJECTIVE:  See eval   21 pt stated she was instructed to come to PT 1 session, wait 3 weeks, then return to PT for 1x/week for 3 weeks. 12/3/21 Pt stated she returned to MD last week and \"he said that you look like all my patients should look a week after my surgery. \"  \"It hurts bad. \"  Pt stated she refuses to use percocet because \"it makes me sick. \"  Pt stated she is not using CP often at home. PT recommended CP 20', 4x/day. 12/17/21 pt stated she was instructed she can stop wearing her sling unless she will be in a crowded area. 12/22/21 did \"great\" after last PT session, \"its doing really well actually\". HEP is going well. Pt has pain at the shoulder blade  12/29: Last week was really bad after therapy. Muscle spasms all through upper back and shoulders. Felt like she had a knot poking her  1/3/22 pt stated her spasms were relieved following last treatment  1/5/22 pt stated she does not have shoulder pain today, but she feels aches. Pt can use R UE to fix her hair. Because pain has been low she sometimes does not think about using the arm and it catches when she reaches  Pt stated she will see Dr. Tim Griffith tomorrow  1/10/22 pt stated she saw Dr. Tim Griffith who informed her that \"I am ahead of schedule\" and \"the rotator cuff is not yet healed. \"  Pt stated she is not allowed to reach out to the side with resistance. Pt stated she is allowed to reach behind her back. Pt stated the anterior shoulder is sore. Pt has not been using oral pain meds. 1/12/22 c/o R upper arm pain and elbow pain \"like a bad toothache\"- sore from chopping ingredients for [place]   1/17: Still can't get arm up behind her back well. Gets a lot of painful \"catching\" in shoulder  1/19/22 Life is good, shoulder is sore, stiff reaching behind back  1/24: Doing good except for behind her back. Anterior shoulder is sore and it shoots down and around to posterior elbow  1/26/22 pt c/o stiffness when reaching behind the back  2/9/22 pt stated she has not yet seen her doctor for a follow up due to weather. Pt stated her shoulder is \"great\" but continues to have difficulty reaching behind her back. Pt returns to Dr. Stacia Guardado office tomorrow.       OBJECTIVE:    Observation:   Quick Dash: 11/19/21 49 raw; 86.4% dysfunction; 1/12/22 32 raw, 47.7% dysfunction      Test measurements:    PROM Right  11/19/21 Right  12/22/21 Right  1/5/22 Right  1/19/22 Right  1/26/22   Shoulder Flex  150 175 175   Shoulder Abd NT       Shoulder ER NT 40 60  (not pushing aggressively) 75 80   Shoulder IR NT        IR behind back     To T11              Strength  Right       Shoulder Flex NT NT      Shoulder Scap NT NT      Shoulder ER NT NT      Shoulder IR NT NT          RESTRICTIONS/PRECAUTIONS: follow protocol, pt has delayed protocol from Dr. Fe Aguilar    Exercises/Interventions:   Therapeutic Ex (98095)  Min: 15 Resistance/Repetitions CUES/Notes   Overhead pulley  Flexion  IR behind back   30x  10x    Table slides flexion 10x    Pendulum 30x cw, 30x ccw    Wall walking  Flexion  Scaption   10x  10x    Supine wand  Flexion   ER up to 40 deg   20x  2x10    IR behind back stretch with towel   10x5\"                       Manual Intervention  (46438)  Min: 15     STM R upper quarter R biceps tendon/pec    Stretching   Flexion  ER  IR  CBA   1x30\"  1x30\"  1x30  1x30\"                   NMR re-education (43032)  Min: 20     Roll ball on table  Flexion  Scaption Green ball  30x  30x    Shrug  HEP   Rhomboid pinch  HEP   Isometric ABD vs wall 10x    [place]ball 20x flexion    AAROM flexion to 140 10x2    AAROM ER to 40 10x2    AAROM up the back   10x  10x    AAROM internal abduction 10x    AAROM cross-body abduction 10x    Progressive 2-hand supine press Wand press, narrow  15x  Wand press, wide  15x  Press with wash cloth 15x  Press R unilateral 15x       Begin the following  Wash cloth press  R unilateral press  Progressive tilt    strengthening     Biceps curls  Supination 1# 10x2; 2# 10x1  1# 10x2; 2# 10x1         Therapeutic Activity (42782)  Min:               Modalities:  Min     CP R shoulder Pt elected to do at home           Other Therapeutic Activities:  Pt was educated on PT POC, Diagnosis, Prognosis, pathomechanics as well as frequency and duration of scheduling future physical therapy appointments. Time was also taken on this day to answer all patient questions and participation in PT. Reviewed appointment policy in detail with patient and patient verbalized understanding. Home Exercise Program:    Access Code: [facility]  URL: [email or web address]  Date: 01/26/2022  Prepared by: Shruthi Haynes  Exercises  Seated Scapular Retraction - 3 x daily - 7 x weekly - 10 reps - 5 hold  Standing Shoulder Shrugs - 3 x daily - 7 x weekly - 10 reps - 5 hold  Isometric Shoulder Abduction at Wall - 3 x daily - 7 x weekly - 10 reps - 5 hold  Supported Elbow Flexion Extension PROM - 3 x daily - 7 x weekly - 10 reps  Seated Shoulder Flexion Towel Slide at Table Top Full Range of Motion - 3 x daily - 7 x weekly - 10 reps - 5 hold  Circular Shoulder Pendulum with Table Support - 3 x daily - 7 x weekly - 10 reps  Supine Shoulder Flexion Extension AAROM with Dowel - 3 x daily - 7 x weekly - 10 reps  Supine Shoulder Press AAROM in Abduction with Dowel - 3 x daily - 7 x weekly - 2 sets - 10 reps  Supine Shoulder Press - 7 x weekly - 1 sets - 10 reps  Standing Shoulder Posterior Capsule Stretch - 3 x daily - 7 x weekly - 1 reps - 30 hold  Supine Shoulder Internal Rotation - 3 x daily - 7 x weekly - 10 reps  Standing Shoulder Internal Rotation Stretch with Towel - 3 x daily - 7 x weekly - 5 reps - 5 hold           Therapeutic Exercise and NMR EXR  [] (89992) Provided verbal/tactile cueing for activities related to strengthening, flexibility, endurance, ROM  for improvements in scapular, scapulothoracic and UE control with self care, reaching, carrying, lifting, house/yardwork, driving/computer work.    [] (52183) Provided verbal/tactile cueing for activities related to improving balance, coordination, kinesthetic sense, posture, motor skill, proprioception  to assist with  scapular, scapulothoracic and UE control with self care, reaching, carrying, lifting, house/yardwork, driving/computer work.     Therapeutic Activities:    [] (97559 or ) Provided verbal/tactile cueing for activities related to improving balance, coordination, kinesthetic sense, posture, motor skill, proprioception and motor activation to allow for proper function of scapular, scapulothoracic and UE control with self care, carrying, lifting, driving/computer work.      Home Exercise Program:    [x] (16863) Reviewed/Progressed HEP activities related to strengthening, flexibility, endurance, ROM of scapular, scapulothoracic and UE control with self care, reaching, carrying, lifting, house/yardwork, driving/computer work  [] (97402) Reviewed/Progressed HEP activities related to improving balance, coordination, kinesthetic sense, posture, motor skill, proprioception of scapular, scapulothoracic and UE control with self care, reaching, carrying, lifting, house/yardwork, driving/computer work      Manual Treatments:  PROM / STM / Oscillations-Mobs:  G-I, II, III, IV (PA's, Inf., Post.)  [] (10836) Provided manual therapy to mobilize soft tissue/joints of cervical/CT, scapular GHJ and UE for the purpose of modulating pain, promoting relaxation,  increasing ROM, reducing/eliminating soft tissue swelling/inflammation/restriction, improving soft tissue extensibility and allowing for proper ROM for normal function with self care, reaching, carrying, lifting, house/yardwork, driving/computer work    Charges:  Timed Code Treatment Minutes: 45   Total Treatment Minutes: 45       [] EVAL (LOW) 22968 (typically 20 minutes face-to-face)  [] EVAL (MOD) 46910 (typically 30 minutes face-to-face)  [] EVAL (HIGH) 51933 (typically 45 minutes face-to-face)  [] RE-EVAL     [x] BY(77323) x     [] Dry needle 1 or 2 Muscles (26039)  [x] NMR (42595) x     [] Dry needle 3+ Muscles (10242)  [x] Manual (57423) x     [] Ultrasound (65062) x  [] TA (54495) x     [] Mech Traction (62663)  [] ES(attended) (77119)     [] ES (un) (78880):   [] Vasopump (69471) [] Ionto (71059)   [] Other:        GOALS:  Patient stated goal: \"ability to move arm\"  []? Progressing: []? Met: []? Not Met: []? Adjusted     Therapist goals for Patient:   Short Term Goals: To be achieved in: 2 weeks  1. Independent in HEP and progression per patient tolerance, in order to prevent re-injury. []? Progressing: []? Met: []? Not Met: []? Adjusted  2. Patient will have a decrease in pain to facilitate improvement in movement, function, and ADLs as indicated by Functional Deficits. []? Progressing: []? Met: []? Not Met: []? Adjusted     Long Term Goals: To be achieved in: 12 weeks  1. Disability index score of 35% or less for the Quick DASH to assist with reaching prior level of function. []? Progressing: []? Met: []? Not Met: []? Adjusted  2. Patient will demonstrate increased AROM to 140 degrees to allow for proper joint functioning as indicated by Functional Deficits. []? Progressing: []? Met: []? Not Met: []? Adjusted  3. Patient will demonstrate an increase in NM recruitment/activation and overall GH and scapular strength to within n5lbs HHD or WNL for proper functional mobility as indicated by patients Functional Deficits. []? Progressing: []? Met: []? Not Met: []? Adjusted  4. Patient will return to house cleaning activities without increased symptoms or restriction. []? Progressing: []? Met: []? Not Met: []? Adjusted  5. Patient will be able to sleep at night without waking due to pain. []? Progressing: []? Met: []? Not Met: []? Adjusted    ASSESSMENT:  See eval    Treatment/Activity Tolerance:  [x] Patient tolerated treatment well [] Patient limited by fatique  [] Patient limited by pain  [] Patient limited by other medical complications  [] Other:     Overall Progression Towards Functional goals/ Treatment Progress Update:  [] Patient is progressing as expected towards functional goals listed. [] Progression is slowed due to complexities/Impairments listed.   [] Progression has been slowed due to co-morbidities. [x] Plan just implemented, too soon to assess goals progression <30days   [] Goals require adjustment due to lack of progress  [] Patient is not progressing as expected and requires additional follow up with physician  [] Other    Prognosis for POC: [x] Good [] Fair  [] Poor    Patient requires continued skilled intervention: [x] Yes  [] No      PLAN: Follow protocol- begin wash cloth press if tolerated; progress HEP- wand press with narrow , wand shoulder flexion in supine, posterior capsule stretch  [x] Continue per plan of care [] Alter current plan (see comments)  [] Plan of care initiated [] Hold pending MD visit [] Discharge    Electronically signed by: Lizabeth Stock PT     Note: If patient does not return for scheduled/recommended follow up visits, this note will serve as a discharge from care along with the most recent update on progress.

## 2022-02-10 ENCOUNTER — OFFICE VISIT (OUTPATIENT)
Dept: ORTHOPEDIC SURGERY | Age: 67
End: 2022-02-10

## 2022-02-10 VITALS — HEIGHT: 63 IN | WEIGHT: 168 LBS | BODY MASS INDEX: 29.77 KG/M2

## 2022-02-10 DIAGNOSIS — Z98.890 S/P ARTHROSCOPY OF RIGHT SHOULDER: ICD-10-CM

## 2022-02-10 DIAGNOSIS — Z98.890 S/P ROTATOR CUFF SURGERY: ICD-10-CM

## 2022-02-10 DIAGNOSIS — Z98.890 S/P ROTATOR CUFF SURGERY: Primary | ICD-10-CM

## 2022-02-10 PROCEDURE — 99024 POSTOP FOLLOW-UP VISIT: CPT | Performed by: PHYSICIAN ASSISTANT

## 2022-02-10 RX ORDER — TRAMADOL HYDROCHLORIDE 50 MG/1
50 TABLET ORAL EVERY 8 HOURS
Qty: 22 TABLET | Refills: 0 | OUTPATIENT
Start: 2022-02-10 | End: 2022-02-17

## 2022-02-10 RX ORDER — TRAMADOL HYDROCHLORIDE 50 MG/1
50 TABLET ORAL EVERY 8 HOURS
Qty: 22 TABLET | Refills: 0 | OUTPATIENT
Start: 2022-02-10 | End: 2022-02-10 | Stop reason: CLARIF

## 2022-02-10 NOTE — LETTER
Physical Therapy Rehabilitation Referral    Patient Name:  Robert Jackson      YOB: 1955    Diagnosis:    1. S/P rotator cuff surgery    2. S/P arthroscopy of right shoulder    arthroscopic repair of massive three-tendon rotator cuff tear using suture anchor repair and collagen patch augmentation. Precautions:     [x] Evaluate and Treat    Post Op Instructions:  [] Continuous passive motion (CPM) [x] Elbow ROM  [x] Exercise in plane of scapula  [x]  Strengthening     [x] Pulley and instruction   [x] Home exercise program (copy to patient)   [] Sling when arm at risk  [] Sling or brace at all times   [x] AAROM: Forward elevation to  140            [x] AAROM: External rotation  To  40    [] Isometric external rotator strengthening [x] AAROM: internal rotation: up the back  [x] Isometric abductor strengthening  [x] AAROM: Internal abduction   [] Isometric internal rotator strengthening [x] AAROM: cross-body adduction             Stretching:     Strengthening:  [x] Four quadrant (FE, ER, IR, CBA)  [] Rotator cuff (ER, IR, Abd) at 13 weeks  [x] Forward Elevation    [] External Rotators     [x] External Rotation    [] Internal Rotators  [x] Internal Rotation: up/back   [] Abductors     [x] Internal Rotation: supine in abduction  [x] Sleeper Stretch    [] Flexors  [x] Cross-body abduction    [] Extensors  [x] Pendulum (FE, Abd/Add, cw/ccw)  [x] Scapular Stabilizers   [x] Wall-walking (FE, Abd)        [x] Shoulder shrugs     [x] Table slides (FE)                [x] Rhomboid pinch  [] Elbow (flex, ext, pron, sup)        [] Lat.  Pull downs     [] Medial epicondylitis program       [] Forward punch   [] Lateral epicondylitis program       [] Internal rotators     [x] Progressive resistive exercises  [] Bench Press        [] Bench press plus  Activities:     [] Lateral pull-downs  [x] Rowing     [] Progressive two-hand supine press  [] Stepper/Exercise bike   [x] Biceps: curls/supination  [] Swimming  []
Swimming  [] Water exercises    Modalities:     Return to Sport:  [x] Of Choice      [] Plyometrics  [] Ultrasound     [] Rhythmic stabilization  [] Iontophoresis    [] Core strengthening   [] Moist heat     [] Sports specific program:   [] Massage         [x] Cryotherapy      [] Electrical stimulation     [] Paraffin  [] Whirlpool  [] TENS    [x] Home exercise program (copy to patient). Perform exercises for:   15     minutes    3      times/day  [x] Supervised physical therapy  Frequency: []  1x week  [x] 2x week  [] 3x week  [] Other:   Duration: [] 2 weeks   [] 4 weeks  [x] 6 weeks  [] Other:     Additional Instructions: she may start light rotator cuff strengthening at 13 weeks postop. Tennille Rolon MD, PhD

## 2022-02-10 NOTE — PROGRESS NOTES
patient  underwent a right shoulder arthroscopy for repair of a massive 3 tendon repair on 11/16/2022. I feel that she is currently ahead of schedule. Impression:  Encounter Diagnoses   Name Primary?  S/P rotator cuff surgery Yes    S/P arthroscopy of right shoulder        Office Procedures:  No orders of the defined types were placed in this encounter. Treatment Plan:    Overall the patient is doing well. The pain is well-controlled. She is doing really well in her overall recovery. Patient was told that she may start working on rotator cuff strengthening next week. Her therapy orders were updated. She she was again told to not let the pain be her guide and to limit some of the heavy housework activities that she has been doing. We will go ahead and update her physical therapy orders today. All of her questions were fully answered today. We would like to see her back in 6 weeks for follow-up visit. 1:36 PM      Bubba Roberts PA-C  Orthopaedic Sports Medicine Physician Assistant    This dictation was performed with a verbal recognition program [facility]) and it was checked for errors. It is possible that there are still dictated errors within this office note. If so, please bring any errors to my attention for an addendum. All efforts were made to ensure that this office note is accurate.

## 2022-02-16 ENCOUNTER — HOSPITAL ENCOUNTER (OUTPATIENT)
Dept: PHYSICAL THERAPY | Age: 67
Setting detail: THERAPIES SERIES
Discharge: HOME | End: 2022-02-16
Payer: MEDICARE

## 2022-02-16 PROCEDURE — 97530 THERAPEUTIC ACTIVITIES: CPT

## 2022-02-16 PROCEDURE — 97110 THERAPEUTIC EXERCISES: CPT

## 2022-02-16 PROCEDURE — 97112 NEUROMUSCULAR REEDUCATION: CPT

## 2022-02-16 NOTE — FLOWSHEET NOTE
stated she is not using CP often at home. PT recommended CP 20', 4x/day. 12/17/21 pt stated she was instructed she can stop wearing her sling unless she will be in a crowded area. 12/22/21 did \"great\" after last PT session, \"its doing really well actually\". HEP is going well. Pt has pain at the shoulder blade  12/29: Last week was really bad after therapy. Muscle spasms all through upper back and shoulders. Felt like she had a knot poking her  1/3/22 pt stated her spasms were relieved following last treatment  1/5/22 pt stated she does not have shoulder pain today, but she feels aches. Pt can use R UE to fix her hair. Because pain has been low she sometimes does not think about using the arm and it catches when she reaches  Pt stated she will see Dr. Aimee Gross tomorrow  1/10/22 pt stated she saw Dr. Aimee Gross who informed her that \"I am ahead of schedule\" and \"the rotator cuff is not yet healed. \"  Pt stated she is not allowed to reach out to the side with resistance. Pt stated she is allowed to reach behind her back. Pt stated the anterior shoulder is sore. Pt has not been using oral pain meds. 1/12/22 c/o R upper arm pain and elbow pain \"like a bad toothache\"- sore from chopping ingredients for [place]   1/17: Still can't get arm up behind her back well. Gets a lot of painful \"catching\" in shoulder  1/19/22 Life is good, shoulder is sore, stiff reaching behind back  1/24: Doing good except for behind her back. Anterior shoulder is sore and it shoots down and around to posterior elbow  1/26/22 pt c/o stiffness when reaching behind the back  2/9/22 pt stated she has not yet seen her doctor for a follow up due to weather. Pt stated her shoulder is \"great\" but continues to have difficulty reaching behind her back. Pt returns to Dr. Sara Estevez office tomorrow. 2/16/22 POD 13 weeks, 1 day. Pt was given new protocol to follow for PT treatment. Pt saw Dr. Sara Estevez office last week.   Pt stated she was informed \"to stop letting pain be my guide\" as \"tendons and stuff are in a healing mode\" and \"don't  a gallon of milk\" and to avoid weight bearing through the arm. Pt stated she was instructed to \"avoid being so hard on myself. \"  Pt will be reassessed in 6 weeks by her doctor and might be discharged at that point. OBJECTIVE:    Observation:   Quick Dash: 11/19/21 49 raw; 86.4% dysfunction; 1/12/22 32 raw, 47.7% dysfunction      Test measurements:    PROM     Right  11/19/21 Right  12/22/21 Right  1/5/22 Right  1/19/22 Right  1/26/22   Shoulder Flex  150 175 175   Shoulder Abd NT       Shoulder ER NT 40 60  (not pushing aggressively) 75 80   Shoulder IR NT        IR behind back     To T11              Strength  Right       Shoulder Flex NT NT      Shoulder Scap NT NT      Shoulder ER NT NT      Shoulder IR NT NT          RESTRICTIONS/PRECAUTIONS: follow protocol. At week 13 post op may begin light rotator cuff strengthening.     Exercises/Interventions:   Therapeutic Ex (66608)  Min: 15 Resistance/Repetitions CUES/Notes   Overhead pulley Flexion  IR behind back 30x  10x    Table slides flexion 10x    Pendulum 30x cw, 30x ccw    Wall walking  Flexion  Scaption   10x  10x    Supine wand  Flexion   ER up to 40 deg   20x  2x10    IR behind back stretch with towel   10x5\"                        Manual Intervention  (85970)  Min: 5     STM R upper quarter     Stretching   Flexion  ER  IR  CBA   1x30\"  1x30\"  2x30\"  2x30\"    Sleeper stretch 3x30\"              NMR re-education (44550)  Min: 15     Roll ball on table Flexion  Scaption 30x  30x    Shrug 10x    Rhomboid pinch 10x    Isometric ABD vs wall 10x    [place] 20x flexion    AAROM flexion to 140 10x2    AAROM ER to 40 10x2    AAROM up the back   10x  10x    AAROM internal abduction 10x    AAROM cross-body abduction 10x                        Therapeutic Activity (71826)  Min: 10     Isometric IR at 0 deg ABD  At 45 deg ABD  Isometric ER at 0 deg ABD  At 45 deg ABD 10x  10x  10x  10x Supine     Supine IR/ER AROM at ABD 45 deg ABD 2x10    Supine ER with light dumbbell, UE ABD 30 deg     Theraslide  IR  ER  ABD (waist high)     Biceps curls 1# 10x, 2# 10x    Supination 1# 10x, 2# 10x         Modalities:  Min     CP R shoulder Pt elected to do at home           Other Therapeutic Activities:  Pt was educated on PT POC, Diagnosis, Prognosis, pathomechanics as well as frequency and duration of scheduling future physical therapy appointments. Time was also taken on this day to answer all patient questions and participation in PT. Reviewed appointment policy in detail with patient and patient verbalized understanding. Home Exercise Program:    Access Code: [facility]  URL: [email or web address]  Date: 01/26/2022  Prepared by: Richard Gamez  Exercises  Seated Scapular Retraction - 3 x daily - 7 x weekly - 10 reps - 5 hold  Standing Shoulder Shrugs - 3 x daily - 7 x weekly - 10 reps - 5 hold  Isometric Shoulder Abduction at Wall - 3 x daily - 7 x weekly - 10 reps - 5 hold  Supported Elbow Flexion Extension PROM - 3 x daily - 7 x weekly - 10 reps  Seated Shoulder Flexion Towel Slide at Table Top Full Range of Motion - 3 x daily - 7 x weekly - 10 reps - 5 hold  Circular Shoulder Pendulum with Table Support - 3 x daily - 7 x weekly - 10 reps  Supine Shoulder Flexion Extension AAROM with Dowel - 3 x daily - 7 x weekly - 10 reps  Supine Shoulder Press AAROM in Abduction with Dowel - 3 x daily - 7 x weekly - 2 sets - 10 reps  Supine Shoulder Press - 7 x weekly - 1 sets - 10 reps  Standing Shoulder Posterior Capsule Stretch - 3 x daily - 7 x weekly - 1 reps - 30 hold  Supine Shoulder Internal Rotation - 3 x daily - 7 x weekly - 10 reps  Standing Shoulder Internal Rotation Stretch with Towel - 3 x daily - 7 x weekly - 5 reps - 5 hold      Access Code: [facility]  URL: [email or web address]  Date: 02/16/2022  Standing Isometric Shoulder External Rotation with Doorway - 1 x daily - 7 x weekly - 10 reps - 5 hold  Standing Isometric Shoulder Internal Rotation at Doorway - 1 x daily - 7 x weekly - 10 reps - 5 hold           Therapeutic Exercise and NMR EXR  [] (82765) Provided verbal/tactile cueing for activities related to strengthening, flexibility, endurance, ROM  for improvements in scapular, scapulothoracic and UE control with self care, reaching, carrying, lifting, house/yardwork, driving/computer work.    [] (16789) Provided verbal/tactile cueing for activities related to improving balance, coordination, kinesthetic sense, posture, motor skill, proprioception  to assist with  scapular, scapulothoracic and UE control with self care, reaching, carrying, lifting, house/yardwork, driving/computer work. Therapeutic Activities:    [] (41104 or 77014) Provided verbal/tactile cueing for activities related to improving balance, coordination, kinesthetic sense, posture, motor skill, proprioception and motor activation to allow for proper function of scapular, scapulothoracic and UE control with self care, carrying, lifting, driving/computer work.      Home Exercise Program:    [x] (87223) Reviewed/Progressed HEP activities related to strengthening, flexibility, endurance, ROM of scapular, scapulothoracic and UE control with self care, reaching, carrying, lifting, house/yardwork, driving/computer work  [] (12006) Reviewed/Progressed HEP activities related to improving balance, coordination, kinesthetic sense, posture, motor skill, proprioception of scapular, scapulothoracic and UE control with self care, reaching, carrying, lifting, house/yardwork, driving/computer work      Manual Treatments:  PROM / STM / Oscillations-Mobs:  G-I, II, III, IV (PA's, Inf., Post.)  [] (15751) Provided manual therapy to mobilize soft tissue/joints of cervical/CT, scapular GHJ and UE for the purpose of modulating pain, promoting relaxation,  increasing ROM, reducing/eliminating soft tissue swelling/inflammation/restriction, improving soft tissue extensibility and allowing for proper ROM for normal function with self care, reaching, carrying, lifting, house/yardwork, driving/computer work    Charges:  Timed Code Treatment Minutes: 45   Total Treatment Minutes: 45       [] EVAL (LOW) 07894 (typically 20 minutes face-to-face)  [] EVAL (MOD) 66216 (typically 30 minutes face-to-face)  [] EVAL (HIGH) 46701 (typically 45 minutes face-to-face)  [] RE-EVAL     [x] BM(95547) x     [] Dry needle 1 or 2 Muscles (84239)  [x] NMR (21829) x     [] Dry needle 3+ Muscles (23587)  [] Manual (10684) x     [] Ultrasound (28752) x  [x] TA (64018) x     [] Mech Traction (67276)  [] ES(attended) (11236)     [] ES (un) (43747):   [] Vasopump (07837) [] Ionto (75653)   [] Other:        GOALS:  Patient stated goal: \"ability to move arm\"  []? Progressing: []? Met: []? Not Met: []? Adjusted     Therapist goals for Patient:   Short Term Goals: To be achieved in: 2 weeks  1. Independent in HEP and progression per patient tolerance, in order to prevent re-injury. []? Progressing: []? Met: []? Not Met: []? Adjusted  2. Patient will have a decrease in pain to facilitate improvement in movement, function, and ADLs as indicated by Functional Deficits. []? Progressing: []? Met: []? Not Met: []? Adjusted     Long Term Goals: To be achieved in: 12 weeks  1. Disability index score of 35% or less for the Quick DASH to assist with reaching prior level of function. []? Progressing: []? Met: []? Not Met: []? Adjusted  2. Patient will demonstrate increased AROM to 140 degrees to allow for proper joint functioning as indicated by Functional Deficits. []? Progressing: []? Met: []? Not Met: []? Adjusted  3. Patient will demonstrate an increase in NM recruitment/activation and overall GH and scapular strength to within n5lbs HHD or WNL for proper functional mobility as indicated by patients Functional Deficits. []? Progressing: []?  Met: []? Not Met: []? Adjusted  4. Patient will return to house cleaning activities without increased symptoms or restriction. []? Progressing: []? Met: []? Not Met: []? Adjusted  5. Patient will be able to sleep at night without waking due to pain. []? Progressing: []? Met: []? Not Met: []? Adjusted    ASSESSMENT:  See eval    Treatment/Activity Tolerance:  [x] Patient tolerated treatment well [] Patient limited by fatique  [] Patient limited by pain  [] Patient limited by other medical complications  [] Other:     Overall Progression Towards Functional goals/ Treatment Progress Update:  [] Patient is progressing as expected towards functional goals listed. [] Progression is slowed due to complexities/Impairments listed. [] Progression has been slowed due to co-morbidities. [x] Plan just implemented, too soon to assess goals progression <30days   [] Goals require adjustment due to lack of progress  [] Patient is not progressing as expected and requires additional follow up with physician  [] Other    Prognosis for POC: [x] Good [] Fair  [] Poor    Patient requires continued skilled intervention: [x] Yes  [] No      PLAN: Follow protocol- begin wash cloth press if tolerated; progress HEP- wand press with narrow , wand shoulder flexion in supine, posterior capsule stretch  [x] Continue per plan of care [] Alter current plan (see comments)  [] Plan of care initiated [] Hold pending MD visit [] Discharge    Electronically signed by: Martha Guardado PT     Note: If patient does not return for scheduled/recommended follow up visits, this note will serve as a discharge from care along with the most recent update on progress.

## 2022-02-23 ENCOUNTER — HOSPITAL ENCOUNTER (OUTPATIENT)
Dept: PHYSICAL THERAPY | Age: 67
Setting detail: THERAPIES SERIES
Discharge: HOME | End: 2022-02-23
Payer: MEDICARE

## 2022-02-23 NOTE — FLOWSHEET NOTE
[address] [facility], [facility] [address]  [place] [address]  Phone: [phone]   Fax:     [phone]     Physical Therapy  Cancellation/No-show Note  Patient Name:  Patrick Porter  :  1955   Date:  2022  Cancelled visits to date: 1  No-shows to date: 0    Patient status for today's appointment patient:  [x]  Cancelled  []  Rescheduled appointment  []  No-show     Reason given by patient:  [x]  Patient ill  []  Conflicting appointment  []  No transportation    []  Conflict with work  []  No reason given  []  Other:     Comments:      Phone call information:   []  Phone call made today to patient at _ time at number provided:      []  Patient answered, conversation as follows:    []  Patient did not answer, message left as follows:  []  Phone call not made today    Electronically signed by:  Nancy Iqbal, PT

## 2022-03-02 ENCOUNTER — HOSPITAL ENCOUNTER (OUTPATIENT)
Dept: PHYSICAL THERAPY | Age: 67
Setting detail: THERAPIES SERIES
Discharge: HOME | End: 2022-03-02
Payer: MEDICARE

## 2022-03-02 PROCEDURE — 97110 THERAPEUTIC EXERCISES: CPT

## 2022-03-02 PROCEDURE — 97140 MANUAL THERAPY 1/> REGIONS: CPT

## 2022-03-02 PROCEDURE — 97530 THERAPEUTIC ACTIVITIES: CPT

## 2022-03-02 NOTE — FLOWSHEET NOTE
[facility]  Outpatient Rehabilitation and Therapy, [address] [facility] AT [place] [address]  Phone: [phone]   Fax:     [phone]        Physical Therapy Treatment Note/ Progress Report:       Date:  3/2/2022    Patient Name:  Robert Jackson   \"Lisa\" :  1955  MRN: [id number]    Pertinent Medical History:     Medical/Treatment Diagnosis Information:  · Diagnosis: Repair massive tear with patch; delayed rehab; Right shoulder impingement, chronic rotator cuff tear, biceps tendon tear with chronic biceps tendon tear, early osteoarthritis, massive three-tendon rotator cuff tear with thinning ofthe tendon. (P27.810)  · Treatment Diagnosis: Decreased ADL status    Insurance/Certification information:  PT Insurance Information: Medicare/[employer]  Physician Information:  Referring Practitioner: Dr. Suly Hammonds of care signed (Y/N): Y    Date of Patient follow up with Physician:      Progress Report: []  Yes  [x]  No     Date Range for reporting period:  Beginnin21  Ending:      Progress report due (10 Rx/or 30 days whichever is less):      Recertification due (POC duration/ or 90 days whichever is less):      Visit # POC/Insurance Allowable Auth Needed   16 22 []Yes    []No     Pain level:  0/10     History of Injury:Patient stated she had surgery 21. Pt stated she has a lot of pain at times, but Percocet helps control pain. Pt stated she has had shoulder problems for \"a long time. \"  Pt stated she was instructed to wear her sling all the time. \"         SUBJECTIVE:  See eval   21 pt stated she was instructed to come to PT 1 session, wait 3 weeks, then return to PT for 1x/week for 3 weeks. 12/3/21 Pt stated she returned to MD last week and \"he said that you look like all my patients should look a week after my surgery. \"  \"It hurts bad. \"  Pt stated she refuses to use percocet because \"it makes me sick. \"  Pt stated she is not using CP often at home. PT recommended CP 20', 4x/day. 12/17/21 pt stated she was instructed she can stop wearing her sling unless she will be in a crowded area. 12/22/21 did \"great\" after last PT session, \"its doing really well actually\". HEP is going well. Pt has pain at the shoulder blade  12/29: Last week was really bad after therapy. Muscle spasms all through upper back and shoulders. Felt like she had a knot poking her  1/3/22 pt stated her spasms were relieved following last treatment  1/5/22 pt stated she does not have shoulder pain today, but she feels aches. Pt can use R UE to fix her hair. Because pain has been low she sometimes does not think about using the arm and it catches when she reaches  Pt stated she will see Dr. Jus Mitchell tomorrow  1/10/22 pt stated she saw Dr. Jus Mitchell who informed her that \"I am ahead of schedule\" and \"the rotator cuff is not yet healed. \"  Pt stated she is not allowed to reach out to the side with resistance. Pt stated she is allowed to reach behind her back. Pt stated the anterior shoulder is sore. Pt has not been using oral pain meds. 1/12/22 c/o R upper arm pain and elbow pain \"like a bad toothache\"- sore from chopping ingredients for [place]   1/17: Still can't get arm up behind her back well. Gets a lot of painful \"catching\" in shoulder  1/19/22 Life is good, shoulder is sore, stiff reaching behind back  1/24: Doing good except for behind her back. Anterior shoulder is sore and it shoots down and around to posterior elbow  1/26/22 pt c/o stiffness when reaching behind the back  2/9/22 pt stated she has not yet seen her doctor for a follow up due to weather. Pt stated her shoulder is \"great\" but continues to have difficulty reaching behind her back. Pt returns to Dr. Nasim Lin office tomorrow. 2/16/22 POD 13 weeks, 1 day. Pt was given new protocol to follow for PT treatment. Pt saw Dr. Nasim Lin office last week.   Pt stated she was informed \"to SLER 1#, 2x10                   Therapeutic Activity (58590)  Min: 10     Isometric IR at 0 deg ABD  At 45 deg ABD  Isometric ER at 0 deg ABD  At 45 deg ABD   10x    10x Supine          Supine ER with light dumbbell, UE ABD 30 deg 1#, 30x Pillow under elbow   Theraslide  IR  ER  ABD (waist high)   Yellow, 10x2  Yellow, 10x2  Yellow, 10x2    Biceps curls 2# 10x2    Supination  2# 10x2         Modalities:  Min     CP R shoulder Pt elected to do at home           Other Therapeutic Activities:  Pt was educated on PT POC, Diagnosis, Prognosis, pathomechanics as well as frequency and duration of scheduling future physical therapy appointments. Time was also taken on this day to answer all patient questions and participation in PT. Reviewed appointment policy in detail with patient and patient verbalized understanding. Home Exercise Program:    Access Code: [facility]  URL: [email or web address]  Date: 01/26/2022  Prepared by: Vidya Emery  Exercises  Seated Scapular Retraction - 3 x daily - 7 x weekly - 10 reps - 5 hold  Standing Shoulder Shrugs - 3 x daily - 7 x weekly - 10 reps - 5 hold  Isometric Shoulder Abduction at Wall - 3 x daily - 7 x weekly - 10 reps - 5 hold  Supported Elbow Flexion Extension PROM - 3 x daily - 7 x weekly - 10 reps  Seated Shoulder Flexion Towel Slide at Table Top Full Range of Motion - 3 x daily - 7 x weekly - 10 reps - 5 hold  Circular Shoulder Pendulum with Table Support - 3 x daily - 7 x weekly - 10 reps  Supine Shoulder Flexion Extension AAROM with Dowel - 3 x daily - 7 x weekly - 10 reps  Supine Shoulder Press AAROM in Abduction with Dowel - 3 x daily - 7 x weekly - 2 sets - 10 reps  Supine Shoulder Press - 7 x weekly - 1 sets - 10 reps  Standing Shoulder Posterior Capsule Stretch - 3 x daily - 7 x weekly - 1 reps - 30 hold  Supine Shoulder Internal Rotation - 3 x daily - 7 x weekly - 10 reps  Standing Shoulder Internal Rotation Stretch with Towel - 3 x daily - 7 x weekly - 5 reps - 5 hold      Access Code: [facility]  URL: [email or web address]  Date: 02/16/2022  Standing Isometric Shoulder External Rotation with Doorway - 1 x daily - 7 x weekly - 10 reps - 5 hold  Standing Isometric Shoulder Internal Rotation at Doorway - 1 x daily - 7 x weekly - 10 reps - 5 hold     Access Code: [facility]  URL: [email or web address]  Date: 03/02/2022  Prepared by: Sheridan Exon    Exercises  Seated Scapular Retraction - 3 x daily - 7 x weekly - 10 reps - 5 hold  Standing Shoulder Shrugs - 3 x daily - 7 x weekly - 10 reps - 5 hold  Isometric Shoulder Abduction at Wall - 3 x daily - 7 x weekly - 10 reps - 5 hold  Supported Elbow Flexion Extension PROM - 3 x daily - 7 x weekly - 10 reps  Seated Shoulder Flexion Towel Slide at Table Top Full Range of Motion - 3 x daily - 7 x weekly - 10 reps - 5 hold  Circular Shoulder Pendulum with Table Support - 3 x daily - 7 x weekly - 10 reps  Supine Shoulder Flexion Extension AAROM with Dowel - 3 x daily - 7 x weekly - 10 reps  Supine Shoulder Press AAROM in Abduction with Dowel - 3 x daily - 7 x weekly - 2 sets - 10 reps  Supine Shoulder Press - 7 x weekly - 1 sets - 10 reps  Standing Shoulder Posterior Capsule Stretch - 3 x daily - 7 x weekly - 1 reps - 30 hold  Supine Shoulder Internal Rotation - 3 x daily - 7 x weekly - 10 reps  Standing Shoulder Internal Rotation Stretch with Towel - 3 x daily - 7 x weekly - 5 reps - 5 hold  Standing Isometric Shoulder External Rotation with Doorway - 1 x daily - 7 x weekly - 10 reps - 5 hold  Standing Isometric Shoulder Internal Rotation at Doorway - 1 x daily - 7 x weekly - 10 reps - 5 hold  Shoulder External Rotation with Anchored Resistance - 1 x daily - 7 x weekly - 2 sets - 10 reps  Standing Shoulder Internal Rotation with Anchored Resistance - 1 x daily - 7 x weekly - 2 sets - 10 reps     Access Code: [facility]  URL: [email or web address]  Date: 03/02/2022  Shoulder External Rotation with Anchored Resistance - 1 x daily - 7 x weekly - 2 sets - 10 reps  Standing Shoulder Internal Rotation with Anchored Resistance - 1 x daily - 7 x weekly - 2 sets - 10 reps                Therapeutic Exercise and NMR EXR  [] (25507) Provided verbal/tactile cueing for activities related to strengthening, flexibility, endurance, ROM  for improvements in scapular, scapulothoracic and UE control with self care, reaching, carrying, lifting, house/yardwork, driving/computer work.    [] (05896) Provided verbal/tactile cueing for activities related to improving balance, coordination, kinesthetic sense, posture, motor skill, proprioception  to assist with  scapular, scapulothoracic and UE control with self care, reaching, carrying, lifting, house/yardwork, driving/computer work. Therapeutic Activities:    [] (32725 or 35202) Provided verbal/tactile cueing for activities related to improving balance, coordination, kinesthetic sense, posture, motor skill, proprioception and motor activation to allow for proper function of scapular, scapulothoracic and UE control with self care, carrying, lifting, driving/computer work.      Home Exercise Program:    [x] (76830) Reviewed/Progressed HEP activities related to strengthening, flexibility, endurance, ROM of scapular, scapulothoracic and UE control with self care, reaching, carrying, lifting, house/yardwork, driving/computer work  [] (65392) Reviewed/Progressed HEP activities related to improving balance, coordination, kinesthetic sense, posture, motor skill, proprioception of scapular, scapulothoracic and UE control with self care, reaching, carrying, lifting, house/yardwork, driving/computer work      Manual Treatments:  PROM / STM / Oscillations-Mobs:  G-I, II, III, IV (PA's, Inf., Post.)  [] (10390) Provided manual therapy to mobilize soft tissue/joints of cervical/CT, scapular GHJ and UE for the purpose of modulating pain, promoting relaxation,  increasing ROM, reducing/eliminating soft tissue swelling/inflammation/restriction, improving soft tissue extensibility and allowing for proper ROM for normal function with self care, reaching, carrying, lifting, house/yardwork, driving/computer work    Charges:  Timed Code Treatment Minutes: 45   Total Treatment Minutes: 45       [] EVAL (LOW) 24881 (typically 20 minutes face-to-face)  [] EVAL (MOD) 48632 (typically 30 minutes face-to-face)  [] EVAL (HIGH) 23575 (typically 45 minutes face-to-face)  [] RE-EVAL     [x] CZ(58052) x     [] Dry needle 1 or 2 Muscles (36068)  [x] NMR (32431) x     [] Dry needle 3+ Muscles (48921)  [] Manual (64556) x     [] Ultrasound (65365) x  [x] TA (92696) x     [] Mech Traction (00195)  [] ES(attended) (16633)     [] ES (un) (43750):   [] Vasopump (40482) [] Ionto (50844)   [] Other:        GOALS:  Patient stated goal: \"ability to move arm\"  []? Progressing: []? Met: []? Not Met: []? Adjusted     Therapist goals for Patient:   Short Term Goals: To be achieved in: 2 weeks  1. Independent in HEP and progression per patient tolerance, in order to prevent re-injury. []? Progressing: []? Met: []? Not Met: []? Adjusted  2. Patient will have a decrease in pain to facilitate improvement in movement, function, and ADLs as indicated by Functional Deficits. []? Progressing: []? Met: []? Not Met: []? Adjusted     Long Term Goals: To be achieved in: 12 weeks  1. Disability index score of 35% or less for the Quick DASH to assist with reaching prior level of function. []? Progressing: []? Met: []? Not Met: []? Adjusted  2. Patient will demonstrate increased AROM to 140 degrees to allow for proper joint functioning as indicated by Functional Deficits. []? Progressing: []? Met: []? Not Met: []? Adjusted  3. Patient will demonstrate an increase in NM recruitment/activation and overall GH and scapular strength to within n5lbs HHD or WNL for proper functional mobility as indicated by patients Functional Deficits. []?  Progressing: []? Met: []? Not Met: []? Adjusted  4. Patient will return to house cleaning activities without increased symptoms or restriction. []? Progressing: []? Met: []? Not Met: []? Adjusted  5. Patient will be able to sleep at night without waking due to pain. []? Progressing: []? Met: []? Not Met: []? Adjusted    ASSESSMENT:  See eval    Treatment/Activity Tolerance:  [x] Patient tolerated treatment well [] Patient limited by fatique  [] Patient limited by pain  [] Patient limited by other medical complications  [] Other:     Overall Progression Towards Functional goals/ Treatment Progress Update:  [] Patient is progressing as expected towards functional goals listed. [] Progression is slowed due to complexities/Impairments listed. [] Progression has been slowed due to co-morbidities. [x] Plan just implemented, too soon to assess goals progression <30days   [] Goals require adjustment due to lack of progress  [] Patient is not progressing as expected and requires additional follow up with physician  [] Other    Prognosis for POC: [x] Good [] Fair  [] Poor    Patient requires continued skilled intervention: [x] Yes  [] No      PLAN: Follow protocol- begin wash cloth press if tolerated; progress HEP- wand press with narrow , wand shoulder flexion in supine, posterior capsule stretch  [x] Continue per plan of care [] Alter current plan (see comments)  [] Plan of care initiated [] Hold pending MD visit [] Discharge    Electronically signed by: Martha Guardado PT     Note: If patient does not return for scheduled/recommended follow up visits, this note will serve as a discharge from care along with the most recent update on progress.

## 2022-03-09 ENCOUNTER — HOSPITAL ENCOUNTER (OUTPATIENT)
Dept: PHYSICAL THERAPY | Age: 67
Setting detail: THERAPIES SERIES
Discharge: HOME | End: 2022-03-09
Payer: MEDICARE

## 2022-03-09 PROCEDURE — 97110 THERAPEUTIC EXERCISES: CPT

## 2022-03-09 PROCEDURE — 97530 THERAPEUTIC ACTIVITIES: CPT

## 2022-03-09 PROCEDURE — 97112 NEUROMUSCULAR REEDUCATION: CPT

## 2022-03-09 NOTE — FLOWSHEET NOTE
[facility]  Outpatient Rehabilitation [facility] Therapy, [place] [address]  Phone: [phone]   Fax:     [phone]        Physical Therapy Treatment Note/ Progress Report:       Date:  3/9/2022    Patient Name:  Orin Sellers   \"Lisa\" :  1955  MRN: [id number]    Pertinent Medical History:     Medical/Treatment Diagnosis Information:  · Diagnosis: Repair massive tear with patch; delayed rehab; Right shoulder impingement, chronic rotator cuff tear, biceps tendon tear with chronic biceps tendon tear, early osteoarthritis, massive three-tendon rotator cuff tear with thinning ofthe tendon. (H18.705)  · Treatment Diagnosis: Decreased ADL status    Insurance/Certification information:  PT Insurance Information: Medicare/[employer]  Physician Information:  Referring Practitioner: Dr. Jaxson Jha of care signed (Y/N): Y    Date of Patient follow up with Physician:      Progress Report: []  Yes  [x]  No     Date Range for reporting period:  Beginnin21  Ending:      Progress report due (10 Rx/or 30 days whichever is less):      Recertification due (POC duration/ or 90 days whichever is less):      Visit # POC/Insurance Allowable Auth Needed   17 22 []Yes    []No     Pain level:  0/10     History of Injury:Patient stated she had surgery 21. Pt stated she has a lot of pain at times, but Percocet helps control pain. Pt stated she has had shoulder problems for \"a long time. \"  Pt stated she was instructed to wear her sling all the time. \"         SUBJECTIVE:  See eval   21 pt stated she was instructed to come to PT 1 session, wait 3 weeks, then return to PT for 1x/week for 3 weeks. 12/3/21 Pt stated she returned to MD last week and \"he said that you look like all my patients should look a week after my surgery. \"  \"It hurts bad. \"  Pt stated she refuses to use percocet because \"it makes me sick. \"  Pt stated she is not using CP often at home. PT recommended CP 20', 4x/day. 12/17/21 pt stated she was instructed she can stop wearing her sling unless she will be in a crowded area. 12/22/21 did \"great\" after last PT session, \"its doing really well actually\". HEP is going well. Pt has pain at the shoulder blade  12/29: Last week was really bad after therapy. Muscle spasms all through upper back and shoulders. Felt like she had a knot poking her  1/3/22 pt stated her spasms were relieved following last treatment  1/5/22 pt stated she does not have shoulder pain today, but she feels aches. Pt can use R UE to fix her hair. Because pain has been low she sometimes does not think about using the arm and it catches when she reaches  Pt stated she will see Dr. Giancarlo Gonzalez tomorrow  1/10/22 pt stated she saw Dr. Giancarlo Gonzalez who informed her that \"I am ahead of schedule\" and \"the rotator cuff is not yet healed. \"  Pt stated she is not allowed to reach out to the side with resistance. Pt stated she is allowed to reach behind her back. Pt stated the anterior shoulder is sore. Pt has not been using oral pain meds. 1/12/22 c/o R upper arm pain and elbow pain \"like a bad toothache\"- sore from chopping ingredients for [place]   1/17: Still can't get arm up behind her back well. Gets a lot of painful \"catching\" in shoulder  1/19/22 Life is good, shoulder is sore, stiff reaching behind back  1/24: Doing good except for behind her back. Anterior shoulder is sore and it shoots down and around to posterior elbow  1/26/22 pt c/o stiffness when reaching behind the back  2/9/22 pt stated she has not yet seen her doctor for a follow up due to weather. Pt stated her shoulder is \"great\" but continues to have difficulty reaching behind her back. Pt returns to Dr. Zach Patel office tomorrow. 2/16/22 POD 13 weeks, 1 day. Pt was given new protocol to follow for PT treatment. Pt saw Dr. Zach Patel office last week.   Pt stated she was informed \"to stop letting pain be my guide\" as \"tendons and stuff are in a healing mode\" and \"don't  a gallon of milk\" and to avoid weight bearing through the arm. Pt stated she was instructed to \"avoid being so hard on myself. \"  Pt will be reassessed in 6 weeks by her doctor and might be discharged at that point. 3/2/22 POD 15 weeks, 1 day Pt arrived on a non- scheduled day. PT department was able to accommodate. 3/9/22 POD 16 weeks, 1 day pt stated her shoulder is \"fine, doing great. \"      OBJECTIVE:    Observation:   Quick Dash: 11/19/21 49 raw; 86.4% dysfunction; 1/12/22 32 raw, 47.7% dysfunction; 3/9/22  14 raw      Test measurements:    PROM     Right  11/19/21 Right  12/22/21 Right  1/5/22 Right  1/19/22 Right  1/26/22 Right  3/2/22   Shoulder Flex  150 175 175 PROM 180  AROM 180   Shoulder Abd NT     PROM 180  AROM 180   Shoulder ER NT 40 60  (not pushing aggressively) 75 80 AROM 80   Shoulder IR NT     AROM 80    IR behind back     To T11  To T6              Strength  Right        Shoulder Flex NT NT       Shoulder Scap NT NT       Shoulder ER NT NT       Shoulder IR NT NT           RESTRICTIONS/PRECAUTIONS: follow protocol. At week 13 post op may begin light rotator cuff strengthening.     Exercises/Interventions:   Therapeutic Ex (65821)  Min: 15 Resistance/Repetitions CUES/Notes   Overhead pulley Flexion  IR behind back 30x  10x         Pendulum 30x cw, 30x ccw    Wall walking  Flexion  Scaption   10x  10x    Supine wand  Flexion   ER up to 40 deg   20x  2x10    IR behind back stretch with towel   10x5\"    Stretching   Flexion  ER  IR  CBA   1x30\"  1x30\"  2x30\"  2x30\"    Sleeper stretch 2x30\"              Manual Intervention  ([id number])  Min:                               NMR re-education (66192)  Min: 15     Roll ball on table Flexion  Scaption 30x  30x    Shrug 10x    Rhomboid pinch 10x    Isometric ABD vs wall 10x    Melba 20x flexion    AAROM flexion to 140 10x2    AAROM ER to 40 10x2 AAROM up the back 10x    AAROM internal abduction 10x    AAROM cross-body abduction 10x    SLER 1#, 2x10    SLABD 0#, 3x10              Therapeutic Activity (99035)  Min: 10     Isometric IR at 0 deg ABD  At 45 deg ABD  Isometric ER at 0 deg ABD  At 45 deg ABD   10x    10x Supine          Supine ER with light dumbbell, UE ABD 30 deg 1#, 30x Pillow under elbow   Theraslide  IR  ER  ABD (waist high)   Yellow, 10x3  Yellow, 10x3  Yellow, 10x3    Biceps curls 2# 10x2    Supination  2# 10x2    SLER     Modalities:  Min     CP R shoulder Pt elected to do at home           Other Therapeutic Activities:  Pt was educated on PT POC, Diagnosis, Prognosis, pathomechanics as well as frequency and duration of scheduling future physical therapy appointments. Time was also taken on this day to answer all patient questions and participation in PT. Reviewed appointment policy in detail with patient and patient verbalized understanding. Home Exercise Program:  Access Code: [facility]  URL: [email or web address]  Date: 03/09/2022  Prepared by: Viktoria Fine    Exercises  Seated Scapular Retraction - 3 x daily - 7 x weekly - 10 reps - 5 hold  Standing Shoulder Shrugs - 3 x daily - 7 x weekly - 10 reps - 5 hold  Isometric Shoulder Abduction at Wall - 3 x daily - 7 x weekly - 10 reps - 5 hold  Supported Elbow Flexion Extension PROM - 3 x daily - 7 x weekly - 10 reps  Seated Shoulder Flexion Towel Slide at Table Top Full Range of Motion - 3 x daily - 7 x weekly - 10 reps - 5 hold  Circular Shoulder Pendulum with Table Support - 3 x daily - 7 x weekly - 10 reps  Supine Shoulder Flexion Extension AAROM with Dowel - 3 x daily - 7 x weekly - 10 reps  Supine Shoulder Press AAROM in Abduction with Dowel - 3 x daily - 7 x weekly - 2 sets - 10 reps  Supine Shoulder Press - 7 x weekly - 1 sets - 10 reps  Standing Shoulder Posterior Capsule Stretch - 3 x daily - 7 x weekly - 1 reps - 30 hold  Supine Shoulder Internal Rotation - 3 x daily - 7 x weekly - 10 reps  Standing Shoulder Internal Rotation Stretch with Towel - 3 x daily - 7 x weekly - 5 reps - 5 hold  Standing Isometric Shoulder External Rotation with Doorway - 1 x daily - 7 x weekly - 10 reps - 5 hold  Standing Isometric Shoulder Internal Rotation at Doorway - 1 x daily - 7 x weekly - 10 reps - 5 hold  Shoulder External Rotation with Anchored Resistance - 1 x daily - 7 x weekly - 2 sets - 10 reps  Standing Shoulder Internal Rotation with Anchored Resistance - 1 x daily - 7 x weekly - 2 sets - 10 reps  Sidelying Shoulder External Rotation - 1 x daily - 7 x weekly - 3 sets - 10 reps  Sidelying Shoulder Abduction Palm Forward - 1 x daily - 7 x weekly - 3 sets - 10 reps        Therapeutic Exercise and NMR EXR  [] (80761) Provided verbal/tactile cueing for activities related to strengthening, flexibility, endurance, ROM  for improvements in scapular, scapulothoracic and UE control with self care, reaching, carrying, lifting, house/yardwork, driving/computer work.    [] (48206) Provided verbal/tactile cueing for activities related to improving balance, coordination, kinesthetic sense, posture, motor skill, proprioception  to assist with  scapular, scapulothoracic and UE control with self care, reaching, carrying, lifting, house/yardwork, driving/computer work. Therapeutic Activities:    [] (72182 or 18034) Provided verbal/tactile cueing for activities related to improving balance, coordination, kinesthetic sense, posture, motor skill, proprioception and motor activation to allow for proper function of scapular, scapulothoracic and UE control with self care, carrying, lifting, driving/computer work.      Home Exercise Program:    [x] (72029) Reviewed/Progressed HEP activities related to strengthening, flexibility, endurance, ROM of scapular, scapulothoracic and UE control with self care, reaching, carrying, lifting, house/yardwork, driving/computer work  [] (05078) Reviewed/Progressed HEP activities related to improving balance, coordination, kinesthetic sense, posture, motor skill, proprioception of scapular, scapulothoracic and UE control with self care, reaching, carrying, lifting, house/yardwork, driving/computer work      Manual Treatments:  PROM / STM / Oscillations-Mobs:  G-I, II, III, IV (PA's, Inf., Post.)  [] (64017) Provided manual therapy to mobilize soft tissue/joints of cervical/CT, scapular GHJ and UE for the purpose of modulating pain, promoting relaxation,  increasing ROM, reducing/eliminating soft tissue swelling/inflammation/restriction, improving soft tissue extensibility and allowing for proper ROM for normal function with self care, reaching, carrying, lifting, house/yardwork, driving/computer work    Charges:  Timed Code Treatment Minutes: 45   Total Treatment Minutes: 45       [] EVAL (LOW) 95913 (typically 20 minutes face-to-face)  [] EVAL (MOD) 04551 (typically 30 minutes face-to-face)  [] EVAL (HIGH) 90157 (typically 45 minutes face-to-face)  [] RE-EVAL     [x] LEANDER(59398) x     [] Dry needle 1 or 2 Muscles (84997)  [x] NMR (15445) x     [] Dry needle 3+ Muscles (65866)  [] Manual (46917) x     [] Ultrasound (36906) x  [x] TA (62084) x     [] Mech Traction (19897)  [] ES(attended) (29972)     [] ES (un) (55233):   [] Vasopump (19752) [] Ionto (35176)   [] Other:        GOALS:  Patient stated goal: \"ability to move arm\"  []? Progressing: []? Met: []? Not Met: []? Adjusted     Therapist goals for Patient:   Short Term Goals: To be achieved in: 2 weeks  1. Independent in HEP and progression per patient tolerance, in order to prevent re-injury. []? Progressing: []? Met: []? Not Met: []? Adjusted  2. Patient will have a decrease in pain to facilitate improvement in movement, function, and ADLs as indicated by Functional Deficits. []? Progressing: []? Met: []? Not Met: []? Adjusted     Long Term Goals: To be achieved in: 12 weeks  1.  Disability index score of 35% or less for the Quick DASH to assist with reaching prior level of function. []? Progressing: []? Met: []? Not Met: []? Adjusted  2. Patient will demonstrate increased AROM to 140 degrees to allow for proper joint functioning as indicated by Functional Deficits. []? Progressing: []? Met: []? Not Met: []? Adjusted  3. Patient will demonstrate an increase in NM recruitment/activation and overall GH and scapular strength to within n5lbs HHD or WNL for proper functional mobility as indicated by patients Functional Deficits. []? Progressing: []? Met: []? Not Met: []? Adjusted  4. Patient will return to house cleaning activities without increased symptoms or restriction. []? Progressing: []? Met: []? Not Met: []? Adjusted  5. Patient will be able to sleep at night without waking due to pain. []? Progressing: []? Met: []? Not Met: []? Adjusted    ASSESSMENT:  See eval    Treatment/Activity Tolerance:  [x] Patient tolerated treatment well [] Patient limited by fatique  [] Patient limited by pain  [] Patient limited by other medical complications  [] Other:     Overall Progression Towards Functional goals/ Treatment Progress Update:  [] Patient is progressing as expected towards functional goals listed. [] Progression is slowed due to complexities/Impairments listed. [] Progression has been slowed due to co-morbidities.   [x] Plan just implemented, too soon to assess goals progression <30days   [] Goals require adjustment due to lack of progress  [] Patient is not progressing as expected and requires additional follow up with physician  [] Other    Prognosis for POC: [x] Good [] Fair  [] Poor    Patient requires continued skilled intervention: [x] Yes  [] No      PLAN: Follow protocol  [x] Continue per plan of care [] Alter current plan (see comments)  [] Plan of care initiated [] Hold pending MD visit [] Discharge    Electronically signed by: Bhupinder Betancourt PT     Note: If patient does not return for scheduled/recommended follow up visits, this note will serve as a discharge from care along with the most recent update on progress.

## 2022-03-16 ENCOUNTER — HOSPITAL ENCOUNTER (OUTPATIENT)
Dept: PHYSICAL THERAPY | Age: 67
Setting detail: THERAPIES SERIES
Discharge: HOME | End: 2022-03-16
Payer: MEDICARE

## 2022-03-16 PROCEDURE — 97112 NEUROMUSCULAR REEDUCATION: CPT

## 2022-03-16 PROCEDURE — 97530 THERAPEUTIC ACTIVITIES: CPT

## 2022-03-16 PROCEDURE — 97110 THERAPEUTIC EXERCISES: CPT

## 2022-03-16 NOTE — FLOWSHEET NOTE
Physical Therapy Re-Certification Plan of Care/MD UPDATE      Dear Dr. Becky Dumas,    We had the pleasure of treating the following patient for physical therapy services at [address] [place] [address]. A summary of our findings can be found in the updated assessment below. This includes our plan of care. If you have any questions or concerns regarding these findings, please do not hesitate to contact me at the office phone number checked above. Thank you for the referral.     Physician Signature:________________________________Date:__________________  By signing above (or electronic signature), therapists plan is approved by physician    Date Range Of Visits: 22-3/16/22  Total Visits to Date: 16  Overall Response to Treatment:   []Patient is responding well to treatment and improvement is noted with regards  to goals   []Patient should continue to improve in reasonable time if they continue HEP   []Patient has plateaued and is no longer responding to skilled PT intervention    []Patient is getting worse and would benefit from return to referring MD   []Patient unable to adhere to initial POC   [x]Other: Pt is progressing excellently. Function improved per Quick Dash, ROM is full, has good strength. Pain is minimal to none. PT's opinion is that pt requires no more formal physical therapy. Please advise if in agreement. [facility]  Outpatient Rehabilitation and Therapy, [facility] [address]  [place] [address]  Phone: [phone]   Fax:     [phone]        Physical Therapy Treatment Note/ Progress Report:       Date:  3/16/2022    Patient Name:  Shantanu Ignacio   \"Lisa\" :  1955  MRN: [id number]    Pertinent Medical History:     Medical/Treatment Diagnosis Information:  · Diagnosis: Repair massive tear with patch; delayed rehab; Right shoulder impingement, chronic rotator cuff tear, biceps tendon tear with chronic biceps tendon tear, early osteoarthritis, massive three-tendon rotator cuff tear with thinning ofthe tendon. (M75.120)  · Treatment Diagnosis: Decreased ADL status    Insurance/Certification information:  PT Insurance Information: Medicare/[facility]  Physician Information:  Referring Practitioner: Dr. Jaxson Jha of care signed (Y/N): Y    Date of Patient follow up with Physician:      Progress Report: []  Yes  [x]  No     Date Range for reporting period:  Beginnin21  Ending:      Progress report due (10 Rx/or 30 days whichever is less):      Recertification due (POC duration/ or 90 days whichever is less):      Visit # POC/Insurance Allowable Auth Needed   18  []Yes    []No     Pain level:  0/10     History of Injury:Patient stated she had surgery 21. Pt stated she has a lot of pain at times, but Percocet helps control pain. Pt stated she has had shoulder problems for \"a long time. \"  Pt stated she was instructed to wear her sling all the time. \"         SUBJECTIVE:  See eval   21 pt stated she was instructed to come to PT 1 session, wait 3 weeks, then return to PT for 1x/week for 3 weeks. 12/3/21 Pt stated she returned to MD last week and \"he said that you look like all my patients should look a week after my surgery. \"  \"It hurts bad. \"  Pt stated she refuses to use percocet because \"it makes me sick. \"  Pt stated she is not using CP often at home. PT recommended CP 20', 4x/day. 21 pt stated she was instructed she can stop wearing her sling unless she will be in a crowded area. 21 did \"great\" after last PT session, \"its doing really well actually\". HEP is going well. Pt has pain at the shoulder blade  : Last week was really bad after therapy. Muscle spasms all through upper back and shoulders.  Felt like she had a knot poking her  1/3/22 pt stated her spasms were relieved following last treatment  22 pt stated she does not have shoulder pain today, but she feels aches. Pt can use R UE to fix her hair. Because pain has been low she sometimes does not think about using the arm and it catches when she reaches  Pt stated she will see Dr. iTm Griffith tomorrow  1/10/22 pt stated she saw Dr. Tim Griffith who informed her that \"I am ahead of schedule\" and \"the rotator cuff is not yet healed. \"  Pt stated she is not allowed to reach out to the side with resistance. Pt stated she is allowed to reach behind her back. Pt stated the anterior shoulder is sore. Pt has not been using oral pain meds. 1/12/22 c/o R upper arm pain and elbow pain \"like a bad toothache\"- sore from chopping ingredients for [place]   1/17: Still can't get arm up behind her back well. Gets a lot of painful \"catching\" in shoulder  1/19/22 Life is good, shoulder is sore, stiff reaching behind back  1/24: Doing good except for behind her back. Anterior shoulder is sore and it shoots down and around to posterior elbow  1/26/22 pt c/o stiffness when reaching behind the back  2/9/22 pt stated she has not yet seen her doctor for a follow up due to weather. Pt stated her shoulder is \"great\" but continues to have difficulty reaching behind her back. Pt returns to Dr. Stacia Guardado office tomorrow. 2/16/22 POD 13 weeks, 1 day. Pt was given new protocol to follow for PT treatment. Pt saw Dr. Stacia Guardado office last week. Pt stated she was informed \"to stop letting pain be my guide\" as \"tendons and stuff are in a healing mode\" and \"don't  a gallon of milk\" and to avoid weight bearing through the arm. Pt stated she was instructed to \"avoid being so hard on myself. \"  Pt will be reassessed in 6 weeks by her doctor and might be discharged at that point. 3/2/22 POD 15 weeks, 1 day Pt arrived on a non- scheduled day. PT department was able to accommodate. 3/9/22 POD 16 weeks, 1 day pt stated her shoulder is \"fine, doing great. \"  3/16/22 POD 17 weeks, 1 day shoulder is \"great, I'm almost 100%. \"  Pt stated \"the pain is almost completely gone. \"      OBJECTIVE:    Observation:   Quick Dash: 11/19/21 49 raw; 86.4% dysfunction; 1/12/22 32 raw, 47.7% dysfunction; 3/9/22  14 raw, 6.8% dysfunction      Test measurements:    PROM     Right  11/19/21 Right  12/22/21 Right  1/5/22 Right  1/19/22 Right  1/26/22 Right  3/2/22 Right  3/16/22   Shoulder Flex  150 175 175 PROM 180  AROM 180   AROM 180   Shoulder Abd NT     PROM 180  AROM 180   AROM 180   Shoulder ER NT 40 60  (not pushing aggressively) 75 80 AROM 80 AROM 90   Shoulder IR NT     AROM 80 AROM 90    IR behind back     To T11  To T6 To T5               Strength  Right         Shoulder Flex NT NT        Shoulder Scap NT NT        Shoulder ER NT NT        Shoulder IR NT NT            RESTRICTIONS/PRECAUTIONS: follow protocol. At week 13 post op may begin light rotator cuff strengthening.     Exercises/Interventions:   Therapeutic Ex (60742)  Min: 15 Resistance/Repetitions CUES/Notes   Overhead pulley Flexion  IR behind back 30x  10x         Pendulum 30x cw, 30x ccw    Wall walking  Flexion  Scaption   10x  10x    Supine wand  Flexion   ER up to 40 deg   20x  2x10    IR behind back stretch with towel   10x5\"    Stretching   Flexion  ER  IR  CBA   1x30\"  1x30\"  2x30\"  2x30\"    Sleeper stretch 2x30\"              Manual Intervention  (67738)  Min:                               NMR re-education (92063)  Min: 15     Roll ball on table Flexion  Scaption 30x  30x    Shrug 10x    Rhomboid pinch 10x    Isometric ABD vs wall 10x    [place] 20x flexion    AAROM flexion to 140 10x2    AAROM ER to 40 10x2    AAROM up the back 10x    AAROM internal abduction 10x    AAROM cross-body abduction 10x    SLER 1#, 2x10    SLABD 0#, 3x10    Rhythmic stab at 90 deg flexion 3x30\"         Therapeutic Activity (42307)  Min: 10     Isometric IR at 0 deg ABD  At 45 deg ABD  Isometric ER at 0 deg ABD  At 45 deg ABD   10x    10x Supine          Supine ER with light dumbbell, UE ABD 30 deg 1#, 30x Pillow under elbow   Theraslide  IR  ER  ABD (waist high)   Yellow, 10x3  Yellow, 10x3  Yellow, 10x3    Biceps curls 3# 10x2    Supination  3# 10x2         Modalities:  Min     CP R shoulder Pt elected to do at home           Other Therapeutic Activities:  Pt was educated on PT POC, Diagnosis, Prognosis, pathomechanics as well as frequency and duration of scheduling future physical therapy appointments. Time was also taken on this day to answer all patient questions and participation in PT. Reviewed appointment policy in detail with patient and patient verbalized understanding. Home Exercise Program:  Access Code: [facility]  URL: [email or web address]  Date: 03/09/2022  Prepared by: Viktoria Fine    Exercises  Seated Scapular Retraction - 3 x daily - 7 x weekly - 10 reps - 5 hold  Standing Shoulder Shrugs - 3 x daily - 7 x weekly - 10 reps - 5 hold  Isometric Shoulder Abduction at Wall - 3 x daily - 7 x weekly - 10 reps - 5 hold  Supported Elbow Flexion Extension PROM - 3 x daily - 7 x weekly - 10 reps  Seated Shoulder Flexion Towel Slide at Table Top Full Range of Motion - 3 x daily - 7 x weekly - 10 reps - 5 hold  Circular Shoulder Pendulum with Table Support - 3 x daily - 7 x weekly - 10 reps  Supine Shoulder Flexion Extension AAROM with Dowel - 3 x daily - 7 x weekly - 10 reps  Supine Shoulder Press AAROM in Abduction with Dowel - 3 x daily - 7 x weekly - 2 sets - 10 reps  Supine Shoulder Press - 7 x weekly - 1 sets - 10 reps  Standing Shoulder Posterior Capsule Stretch - 3 x daily - 7 x weekly - 1 reps - 30 hold  Supine Shoulder Internal Rotation - 3 x daily - 7 x weekly - 10 reps  Standing Shoulder Internal Rotation Stretch with Towel - 3 x daily - 7 x weekly - 5 reps - 5 hold  Standing Isometric Shoulder External Rotation with Doorway - 1 x daily - 7 x weekly - 10 reps - 5 hold  Standing Isometric Shoulder Internal Rotation at Doorway - 1 x daily - 7 x weekly - 10 reps - 5 hold  Shoulder External Rotation with Anchored Resistance - 1 x daily - 7 x weekly - 2 sets - 10 reps  Standing Shoulder Internal Rotation with Anchored Resistance - 1 x daily - 7 x weekly - 2 sets - 10 reps  Sidelying Shoulder External Rotation - 1 x daily - 7 x weekly - 3 sets - 10 reps  Sidelying Shoulder Abduction Palm Forward - 1 x daily - 7 x weekly - 3 sets - 10 reps        Therapeutic Exercise and NMR EXR  [] (07745) Provided verbal/tactile cueing for activities related to strengthening, flexibility, endurance, ROM  for improvements in scapular, scapulothoracic and UE control with self care, reaching, carrying, lifting, house/yardwork, driving/computer work.    [] (46003) Provided verbal/tactile cueing for activities related to improving balance, coordination, kinesthetic sense, posture, motor skill, proprioception  to assist with  scapular, scapulothoracic and UE control with self care, reaching, carrying, lifting, house/yardwork, driving/computer work. Therapeutic Activities:    [] (91159 or 62439) Provided verbal/tactile cueing for activities related to improving balance, coordination, kinesthetic sense, posture, motor skill, proprioception and motor activation to allow for proper function of scapular, scapulothoracic and UE control with self care, carrying, lifting, driving/computer work.      Home Exercise Program:    [x] (82994) Reviewed/Progressed HEP activities related to strengthening, flexibility, endurance, ROM of scapular, scapulothoracic and UE control with self care, reaching, carrying, lifting, house/yardwork, driving/computer work  [] (05476) Reviewed/Progressed HEP activities related to improving balance, coordination, kinesthetic sense, posture, motor skill, proprioception of scapular, scapulothoracic and UE control with self care, reaching, carrying, lifting, house/yardwork, driving/computer work      Manual Treatments:  PROM / STM / Oscillations-Mobs:  G-I, II, III, IV (PA's, Inf., Post.)  [] (25709) Provided manual therapy to mobilize soft tissue/joints of cervical/CT, scapular GHJ and UE for the purpose of modulating pain, promoting relaxation,  increasing ROM, reducing/eliminating soft tissue swelling/inflammation/restriction, improving soft tissue extensibility and allowing for proper ROM for normal function with self care, reaching, carrying, lifting, house/yardwork, driving/computer work    Charges:  Timed Code Treatment Minutes: 45   Total Treatment Minutes: 45       [] EVAL (LOW) 70364 (typically 20 minutes face-to-face)  [] EVAL (MOD) 21890 (typically 30 minutes face-to-face)  [] EVAL (HIGH) 38826 (typically 45 minutes face-to-face)  [] RE-EVAL     [x] YV(82594) x     [] Dry needle 1 or 2 Muscles (00652)  [x] NMR (92739) x     [] Dry needle 3+ Muscles (95551)  [] Manual (77069) x     [] Ultrasound (39447) x  [x] TA (89682) x     [] Mech Traction (66225)  [] ES(attended) (83830)     [] ES (un) (26517):   [] Vasopump (14997) [] Ionto (26495)   [] Other:        GOALS:  Patient stated goal: \"ability to move arm\"  []? Progressing: []? Met: []? Not Met: []? Adjusted     Therapist goals for Patient:   Short Term Goals: To be achieved in: 2 weeks  1. Independent in HEP and progression per patient tolerance, in order to prevent re-injury. []? Progressing: []? Met: []? Not Met: []? Adjusted  2. Patient will have a decrease in pain to facilitate improvement in movement, function, and ADLs as indicated by Functional Deficits. []? Progressing: []? Met: []? Not Met: []? Adjusted     Long Term Goals: To be achieved in: 12 weeks  1. Disability index score of 35% or less for the Quick DASH to assist with reaching prior level of function. []? Progressing: []? Met: []? Not Met: []? Adjusted  2. Patient will demonstrate increased AROM to 140 degrees to allow for proper joint functioning as indicated by Functional Deficits. []? Progressing: []? Met: []? Not Met: []? Adjusted  3.  Patient will demonstrate an increase in NM recruitment/activation and overall GH and scapular strength to within n5lbs HHD or WNL for proper functional mobility as indicated by patients Functional Deficits. []? Progressing: []? Met: []? Not Met: []? Adjusted  4. Patient will return to house cleaning activities without increased symptoms or restriction. []? Progressing: []? Met: []? Not Met: []? Adjusted  5. Patient will be able to sleep at night without waking due to pain. []? Progressing: []? Met: []? Not Met: []? Adjusted    ASSESSMENT:  See eval    Treatment/Activity Tolerance:  [x] Patient tolerated treatment well [] Patient limited by fatique  [] Patient limited by pain  [] Patient limited by other medical complications  [] Other:     Overall Progression Towards Functional goals/ Treatment Progress Update:  [] Patient is progressing as expected towards functional goals listed. [] Progression is slowed due to complexities/Impairments listed. [] Progression has been slowed due to co-morbidities. [x] Plan just implemented, too soon to assess goals progression <30days   [] Goals require adjustment due to lack of progress  [] Patient is not progressing as expected and requires additional follow up with physician  [] Other    Prognosis for POC: [x] Good [] Fair  [] Poor    Patient requires continued skilled intervention: [x] Yes  [] No      PLAN: Follow protocol  [x] Continue per plan of care [] Alter current plan (see comments)  [] Plan of care initiated [] Hold pending MD visit [] Discharge    Electronically signed by: Leola Springer, PT     Note: If patient does not return for scheduled/recommended follow up visits, this note will serve as a discharge from care along with the most recent update on progress.

## 2022-03-23 ENCOUNTER — APPOINTMENT (OUTPATIENT)
Dept: PHYSICAL THERAPY | Age: 67
End: 2022-03-23
Payer: MEDICARE

## 2022-07-08 DIAGNOSIS — E78.00 HIGH CHOLESTEROL: ICD-10-CM

## 2022-07-09 LAB
A/G RATIO: 2.1 (ref 1.1–2.2)
ALBUMIN SERPL-MCNC: 4.7 G/DL (ref 3.4–5)
ALP BLD-CCNC: 101 U/L (ref 40–129)
ALT SERPL-CCNC: 23 U/L (ref 10–40)
ANION GAP SERPL CALCULATED.3IONS-SCNC: 13 MMOL/L (ref 3–16)
AST SERPL-CCNC: 23 U/L (ref 15–37)
BILIRUB SERPL-MCNC: <0.2 MG/DL (ref 0–1)
BUN BLDV-MCNC: 17 MG/DL (ref 7–20)
CALCIUM SERPL-MCNC: 9.7 MG/DL (ref 8.3–10.6)
CHLORIDE BLD-SCNC: 102 MMOL/L (ref 99–110)
CHOLESTEROL, TOTAL: 408 MG/DL (ref 0–199)
CO2: 22 MMOL/L (ref 21–32)
CREAT SERPL-MCNC: 0.7 MG/DL (ref 0.6–1.2)
GFR AFRICAN AMERICAN: >60
GFR NON-AFRICAN AMERICAN: >60
GLUCOSE BLD-MCNC: 103 MG/DL (ref 70–99)
HDLC SERPL-MCNC: 39 MG/DL (ref 40–60)
LDL CHOLESTEROL CALCULATED: ABNORMAL MG/DL
LDL CHOLESTEROL DIRECT: 202 MG/DL
POTASSIUM SERPL-SCNC: 4.3 MMOL/L (ref 3.5–5.1)
SODIUM BLD-SCNC: 137 MMOL/L (ref 136–145)
TOTAL PROTEIN: 6.9 G/DL (ref 6.4–8.2)
TRIGL SERPL-MCNC: 781 MG/DL (ref 0–150)
VLDLC SERPL CALC-MCNC: ABNORMAL MG/DL

## 2022-08-15 ENCOUNTER — OFFICE VISIT (OUTPATIENT)
Dept: ORTHOPEDIC SURGERY | Age: 67
End: 2022-08-15
Payer: MEDICARE

## 2022-08-15 VITALS — BODY MASS INDEX: 29.77 KG/M2 | WEIGHT: 168 LBS | HEIGHT: 63 IN

## 2022-08-15 DIAGNOSIS — M16.12 PRIMARY OSTEOARTHRITIS OF LEFT HIP: ICD-10-CM

## 2022-08-15 DIAGNOSIS — M25.552 LEFT HIP PAIN: Primary | ICD-10-CM

## 2022-08-15 PROCEDURE — 1090F PRES/ABSN URINE INCON ASSESS: CPT | Performed by: PHYSICIAN ASSISTANT

## 2022-08-15 PROCEDURE — G8427 DOCREV CUR MEDS BY ELIG CLIN: HCPCS | Performed by: PHYSICIAN ASSISTANT

## 2022-08-15 PROCEDURE — 1123F ACP DISCUSS/DSCN MKR DOCD: CPT | Performed by: PHYSICIAN ASSISTANT

## 2022-08-15 PROCEDURE — G8399 PT W/DXA RESULTS DOCUMENT: HCPCS | Performed by: PHYSICIAN ASSISTANT

## 2022-08-15 PROCEDURE — 3017F COLORECTAL CA SCREEN DOC REV: CPT | Performed by: PHYSICIAN ASSISTANT

## 2022-08-15 PROCEDURE — 4004F PT TOBACCO SCREEN RCVD TLK: CPT | Performed by: PHYSICIAN ASSISTANT

## 2022-08-15 PROCEDURE — G8417 CALC BMI ABV UP PARAM F/U: HCPCS | Performed by: PHYSICIAN ASSISTANT

## 2022-08-15 PROCEDURE — 99213 OFFICE O/P EST LOW 20 MIN: CPT | Performed by: PHYSICIAN ASSISTANT

## 2022-08-15 RX ORDER — OXYCODONE HYDROCHLORIDE AND ACETAMINOPHEN 5; 325 MG/1; MG/1
1 TABLET ORAL EVERY 6 HOURS PRN
Qty: 12 TABLET | Refills: 0 | Status: SHIPPED | OUTPATIENT
Start: 2022-08-15 | End: 2022-09-12 | Stop reason: SDUPTHER

## 2022-08-15 SDOH — HEALTH STABILITY: PHYSICAL HEALTH: ON AVERAGE, HOW MANY MINUTES DO YOU ENGAGE IN EXERCISE AT THIS LEVEL?: 150+ MIN

## 2022-08-15 SDOH — HEALTH STABILITY: PHYSICAL HEALTH: ON AVERAGE, HOW MANY DAYS PER WEEK DO YOU ENGAGE IN MODERATE TO STRENUOUS EXERCISE (LIKE A BRISK WALK)?: 4 DAYS

## 2022-08-15 NOTE — PROGRESS NOTES
This dictation was done with Dragon dictation and may contain mechanical errors related to translation. I have today reviewed with Mackenzie Yoon the clinically relevant, past medical history, medications, allergies, family history, social history, and Review Of Systems form the patients most recent history form & I have documented any details relevant to today's presenting complaints in my history below. Ms. Juan Zee self-reported past medical history, medications, allergies, family history, social history, and Review Of Systems form has been scanned into the chart under the \"Media\" tab. Subjective: Mackenzie Yoon is a 73 y.o. who is here complaining pain in her left hip. She has been treated for her hip and back dating back to 2015 2016. Mostly at [facility]. She has had injections in her back and her hip joint with success. She has current onset of pretty severe pain in her buttock and groin over the last 2 to 3 months. She stands at work and most of her pain is centered over the hip joint on the left side.   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)           Current Outpatient Medications on File Prior to Visit   Medication Sig Dispense Refill    cyclobenzaprine (FLEXERIL) 10 MG tablet TAKE 1 TABLET BY MOUTH THREE TIMES DAILY AS NEEDED FOR MUSCLE SPASMS 90 tablet 2    naproxen (NAPROSYN) 500 MG tablet TAKE 1 TABLET BY MOUTH TWICE DAILY WITH MEALS 60 tablet 0    Icosapent Ethyl (VASCEPA) 1 g CAPS capsule Take 2 capsules by mouth 2 times daily 60 capsule 3    atorvastatin (LIPITOR) 40 MG tablet Take 1 tablet by mouth daily 90 tablet 2    VASCAZEN 1 g CAPS 2 tab  Po bid 120 capsule 3     No current facility-administered medications on file prior to visit. Objective:   Height 5' 3\" (1.6 m), weight 168 lb (76.2 kg), not currently breastfeeding. On examination is a pleasant 43-year-old female who is alert and orient x3 she walks well without antalgia she can cross her leg but she has pain that radiates into the groin area. Hip flexion and hip abduction is satisfactory she has mild tenderness over the greater trochanteric area consistent with bursitis. She has a fullness in her left SI joint area that feels like a lipoma. She has pain with extension and abduction and rotation into the groin area of her left hip. Distal pulses are intact she has a mildly positive straight leg raise bilateral  Neuro exam grossly intact both lower extremities. Intact sensation to light touch. Motor exam 4+ to 5/5 in all major motor groups. Negative Harding's sign. Skin is warm, dry and intact with out erythema or significant increased temperature around the knee joint(s). There are no cutaneous lesions or lymphadenopathy present. X-RAYS:  The x-rays taken the office today show significant osteoarthritis in the left hip joint with loss of joint space subchondral sclerosis and malformation of the left femoral head. 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 More than 50 % of the time was counseling and coordination of care as indicated above. At this point we talked about short long-term expectations and I like to set her up for a cortisone injection into her hip joint with Dr. Lyric Prabhakar. She was given a short supply of Percocet and we talked about the dangers of narcotics long-term.   And further treatment will based on how she responds to this injection      PROCEDURE NOTE:  Schedule hip joint injection      They will schedule a follow up in postinjection

## 2022-08-23 ENCOUNTER — TELEPHONE (OUTPATIENT)
Dept: ORTHOPEDIC SURGERY | Age: 67
End: 2022-08-23

## 2022-08-23 NOTE — TELEPHONE ENCOUNTER
Surgery and/or Procedure Scheduling     Contact Name: Karenann Bamberger \"Lisa\"  Surgical/Procedure Request: SCHEDULE FOR HER LT HIP INJECTION PROCEDURE. Patient Contact Number: [phone]    PATIENT CALL AND SHE WOULD LIKE TO GET SCHEDULE FOR HER INJECTION FOR HER HIP. PLEASE ADVISE.

## 2022-08-24 ENCOUNTER — TELEPHONE (OUTPATIENT)
Dept: ORTHOPEDIC SURGERY | Age: 67
End: 2022-08-24

## 2022-08-24 NOTE — TELEPHONE ENCOUNTER
Patient returning call. She does not want to set up voicemail. I mentioned that it would be easier and that staff could leave a message but she does not want to do that.  She mentioned that she could also be reached through my chart     Please return call

## 2022-09-01 ENCOUNTER — HOSPITAL ENCOUNTER (OUTPATIENT)
Dept: GENERAL RADIOLOGY | Age: 67
Discharge: HOME | End: 2022-09-01
Payer: MEDICARE

## 2022-09-01 ENCOUNTER — OFFICE VISIT (OUTPATIENT)
Dept: ORTHOPEDIC SURGERY | Age: 67
End: 2022-09-01
Payer: MEDICARE

## 2022-09-01 DIAGNOSIS — M16.12 PRIMARY OSTEOARTHRITIS OF LEFT HIP: ICD-10-CM

## 2022-09-01 DIAGNOSIS — M16.12 PRIMARY OSTEOARTHRITIS OF LEFT HIP: Primary | ICD-10-CM

## 2022-09-01 PROCEDURE — 99999 PR OFFICE/OUTPT VISIT,PROCEDURE ONLY: CPT | Performed by: ORTHOPAEDIC SURGERY

## 2022-09-01 PROCEDURE — 2500000003 HC RX 250 WO HCPCS

## 2022-09-01 PROCEDURE — 77002 NEEDLE LOCALIZATION BY XRAY: CPT

## 2022-09-01 PROCEDURE — 77002 NEEDLE LOCALIZATION BY XRAY: CPT | Performed by: ORTHOPAEDIC SURGERY

## 2022-09-01 PROCEDURE — 20610 DRAIN/INJ JOINT/BURSA W/O US: CPT

## 2022-09-01 PROCEDURE — 6360000002 HC RX W HCPCS

## 2022-09-01 PROCEDURE — 20610 DRAIN/INJ JOINT/BURSA W/O US: CPT | Performed by: ORTHOPAEDIC SURGERY

## 2022-09-08 ENCOUNTER — HOSPITAL ENCOUNTER (OUTPATIENT)
Dept: MRI IMAGING | Age: 67
Discharge: HOME | End: 2022-09-08
Payer: MEDICARE

## 2022-09-08 DIAGNOSIS — M54.9 BACK PAIN, UNSPECIFIED BACK LOCATION, UNSPECIFIED BACK PAIN LATERALITY, UNSPECIFIED CHRONICITY: ICD-10-CM

## 2022-09-08 PROCEDURE — 72148 MRI LUMBAR SPINE W/O DYE: CPT

## 2022-10-05 ENCOUNTER — HOSPITAL ENCOUNTER (OUTPATIENT)
Dept: GENERAL RADIOLOGY | Age: 67
Discharge: HOME | End: 2022-10-05
Payer: MEDICARE

## 2022-10-05 ENCOUNTER — HOSPITAL ENCOUNTER (OUTPATIENT)
Age: 67
Discharge: HOME | End: 2022-10-05
Payer: MEDICARE

## 2022-10-05 DIAGNOSIS — G89.29 CHRONIC BILATERAL LOW BACK PAIN, UNSPECIFIED WHETHER SCIATICA PRESENT: ICD-10-CM

## 2022-10-05 DIAGNOSIS — M54.50 CHRONIC BILATERAL LOW BACK PAIN, UNSPECIFIED WHETHER SCIATICA PRESENT: ICD-10-CM

## 2022-10-05 PROCEDURE — 72110 X-RAY EXAM L-2 SPINE 4/>VWS: CPT

## 2022-10-17 ENCOUNTER — HOSPITAL ENCOUNTER (OUTPATIENT)
Dept: PHYSICAL THERAPY | Age: 67
Setting detail: THERAPIES SERIES
Discharge: HOME | End: 2022-10-17
Payer: MEDICARE

## 2022-10-17 DIAGNOSIS — Z78.9 DECREASED ACTIVITIES OF DAILY LIVING (ADL): Primary | ICD-10-CM

## 2022-10-17 PROCEDURE — 97161 PT EVAL LOW COMPLEX 20 MIN: CPT

## 2022-10-17 PROCEDURE — 97140 MANUAL THERAPY 1/> REGIONS: CPT

## 2022-10-17 PROCEDURE — 97110 THERAPEUTIC EXERCISES: CPT

## 2022-10-17 NOTE — PROGRESS NOTES
[facility], [place] [address] [address]  Phone: [phone]   Fax:     [phone]                                                       Physical Therapy Certification    Dear Darryle Cooper, PA-C,    We had the pleasure of evaluating the following patient for physical therapy services at [facility]. A summary of our findings can be found in the initial assessment below. This includes our plan of care. If you have any questions or concerns regarding these findings, please do not hesitate to contact me at the office phone number checked above. Thank you for the referral.       Physician Signature:_______________________________Date:__________________  By signing above (or electronic signature), therapists plan is approved by physician        Patient: Latoya Gonzalez   : 1955   MRN: [id number]  Referring Provider:  Darryle Cooper, PA-C      Evaluation Date: 10/17/2022              Medical Diagnosis:  Low back pain, unspecified [M54.50]    Treatment Diagnosis:    ICD-10-CM    1. Decreased activities of daily living (ADL)  Z78.9                                    Insurance information: PT Insurance Information: Medicare- start at $1,032       Precautions/ Contra-indications: CA, rotator cuff repair R  Latex Allergy:  [x]NO      []YES  Preferred Language for Healthcare:   [x]English       []other:    C-SSRS Triggered by Intake questionnaire (Past 2 wk assessment ):   [x] No, Questionnaire did not trigger screening.   [] Yes, Patient intake triggered C-SSRS Screening      [] C-SSRS Screening completed  [] PCP notified via Epic     SUBJECTIVE: Patient stated her doctor sent her to PT- \"my hip is totally shot and gave me a shot. \"  Pt stated she was recommended for a total hip replacement but would like to wait through the holidays.   Pt was referred to PT for her low back. \"My back is messed up. \" Pt had MRI- \"basically the whole lumbar is messed up, instead of herniated to the side they're front and back. 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 \"  Pt c/o \"this huge knot in my back\" at the L lower lumbar region. Pt had MRI on 9/8/22:     1. There appears to be sacralization of L5.   2. Moderate spinal canal stenosis at L3-L4 and L4-L5.   3. Scattered neural foraminal narrowing as above. 4. Minimal retrolisthesis at L2-L3 with grade 1 anterolisthesis at L4-L5.   5. Question of minimal bone marrow edema associated with L3-L4 facet   arthrosis.        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, Wednesday, Thursday)- pt has pain with climbing ladders, kneeling    Living Status/Prior Level of Function: Independent with ADLs and IADLs    OBJECTIVE:   Repeated Movements:    ROM  Comments   Lumbar Flex WFL    Lumbar Ext WFL      ROM LEFT RIGHT Comments   Lumbar Side Bend WFL, L hip pain WFL    Lumbar Rotation [facility] WFL    Quadrant      LE [facility] WFL    Hamstring Flex [facility] WFL    Piriformis [facility]    Duke test  Tight, caused spasm              Myotomes/Strength Left Right Comments   [x]ALL NORMAL      LE throughout      Hip Adduction 5/5 5/5    Hip Abd 4/5 5/5    Hip Ext 4+/5 5/5    Hip flexion (L1-L2 femoral) 4/5 4/5    Knee extension (L2-L4 femoral) 5/5 5/5    Knee flexion (S1 sciatic) 5/5 5/5    Dorsiflexion (L4-L5 deep peroneal) 5/5 5/5    Great Toe Ext (L5 deep peroneal nerve) 5/5 5/5    Ankle Eversion (S1-S2 super peroneal) 5/5 5/5    Ankle PF(S1-S2 tibial) 5/5 5/5    Multifidus      Transverse Ab        Dermatomes Normal Abnormal Comments   [x]ALL NORMAL            inguinal area (L1)  [] []    anterior mid-thigh (L2) [] []    distal ant thigh/med knee (L3) [] []    medial lower leg and foot (L4) [] []    lateral lower leg and foot (L5) [] []    posterior calf (S1) [] []    medial calcaneus (S2) [] []      Reflexes Normal Abnormal Comments   [x]ALL NORMAL            S1-2 Seated achilles [x] []    S1-2 Prone knee bend [] []    L3-4 Patellar tendon [] []    C5-6 Biceps [] []    C6 Brachioradialis [] []    C7-8 Triceps [] []    Clonus [x] []    Babinski [] []    Harding's [] []      Joint mobility: Pain PA L unilateral L3/4 produced at L SIJ   [x]Normal    []Hypo   []Hyper    Palpation: TTP L3-S1 L unilateral paraspinals/multifidi    Functional Mobility/Transfers: I    Posture: Elevated L innominate    Gait: (include devices/WB status) decreased L step length    Bandages/Dressings/Incisions: NA    Orthopedic Special Tests:    Neural dynamic tension testing Normal Abnormal Comments   Slump Test  - Degree of knee flexion:  [x] []    SLR  [] []    0-30 [] []    30-70 [] []    Femoral nerve (L2-4) [] []       Normal Abnormal N/A Comments   Fwd Bend-aberrant or innominate mvmt) [x] [] []    Trendelenburg [x] [] []    Kemps/Quadrant [] [] []    Jeremy Crumb [] [] []    KATERINA/Gordo [] [] []    Hip scour [] [] []    Supine to sit [] [] []    Prone knee bend [] [] []           Hip thrust [] [] []    SI distraction/compression [] [] []    Sacral Spring/thrust [] [] []               [x] Patient history, allergies, meds reviewed. Medical chart reviewed. See intake form. Review Of Systems (ROS):  [x]Performed Review of systems (Integumentary, CardioPulmonary, Neurological) by intake and observation. Intake form has been scanned into medical record.  Patient has been instructed to contact their primary care physician regarding ROS issues if not already being addressed at this time.      Co-morbidities/Complexities (which will affect course of rehabilitation):   []None           Arthritic conditions   []Rheumatoid arthritis (M05.9)  [x]Osteoarthritis (M19.91)   Cardiovascular conditions   []Hypertension (I10)  [x]Hyperlipidemia (E78.5)  []Angina pectoris (I20)  []Atherosclerosis (I70)  []CVA Musculoskeletal conditions   []Disc pathology   []Congenital spine pathologies   []Prior surgical intervention  []Osteoporosis (M81.8)  []Osteopenia (M85.8)   Endocrine conditions   []Hypothyroid (E03.9)  []Hyperthyroid Gastrointestinal conditions   []Constipation (H66.11)   Metabolic conditions   []Morbid obesity (E66.01)  []Diabetes type 1(E10.65) or 2 (E11.65)   []Neuropathy (G60.9)     Pulmonary conditions   []Asthma (J45)  []Coughing   []COPD (J44.9)   Psychological Disorders  []Anxiety (F41.9)  []Depression (F32.9)   []Other:   [x]Other:    CA   Chronic back pain  R shoulder arthroscopy 11/21- RC repair        Barriers to/and or personal factors that will affect rehab potential:              []Age  []Sex    []Smoker              []Motivation/Lack of Motivation                        []Co-Morbidities              []Cognitive Function, education/learning barriers              []Environmental, home barriers              []profession/work barriers  []past PT/medical experience  []other:  Justification:     Falls Risk Assessment (30 days):   [x] Falls Risk assessed and no intervention required.   [] Falls Risk assessed and Patient requires intervention due to being higher risk   TUG score (>12s at risk):     [] Falls education provided, including:         ASSESSMENT:   Functional Impairments:     []Noted lumbar/proximal hip hypomobility   []Noted lumbosacral and/or generalized hypermobility   []Decreased Lumbosacral/hip/LE functional ROM   [x]Decreased core/proximal hip strength and neuromuscular control    [x]Decreased LE functional strength    []Abnormal reflexes/sensation/myotomal/dermatomal deficits  [x]Reduced balance/proprioceptive control    []other:      Functional Activity Limitations (from functional questionnaire and intake)   [x]Reduced ability to tolerate prolonged functional positions   []Reduced ability or difficulty with changes of positions or transfers between positions   []Reduced ability to maintain good posture and demonstrate good body mechanics with sitting, bending, and lifting   [x]Reduced ability to sleep   [] Reduced ability or tolerance with driving and/or computer work   [x]Reduced ability to perform lifting, reaching, carrying tasks   [x]Reduced ability to squat   [x]Reduced ability to forward bend   [x]Reduced ability to ambulate prolonged functional periods/distances/surfaces   [x]Reduced ability to ascend/descend stairs   []other:       Participation Restrictions   []Reduced participation in self care activities   [x]Reduced participation in home management activities   [x]Reduced participation in work activities   [x]Reduced participation in social activities. [x]Reduced participation in sport/recreational activities. Classification:   []Signs/symptoms consistent with Lumbar instability/stabilization subgroup. [x]Signs/symptoms consistent with Lumbar mobilization/manipulation subgroup, myotomes and dermatomes intact. Meets manipulation criteria. []Signs/symptoms consistent with Lumbar direction specific/centralization subgroup   []Signs/symptoms consistent with Lumbar traction subgroup       []Signs/symptoms consistent with lumbar facet dysfunction   [x]Signs/symptoms consistent with lumbar stenosis type dysfunction   []Signs/symptoms consistent with nerve root involvement including myotome & dermatome dysfunction   []Signs/symptoms consistent with post-surgical status including: decreased ROM, strength and function.    []signs/symptoms consistent with pathology which may benefit from Dry needling     []other:      Prognosis/Rehab Potential: []Excellent   [x]Good    []Fair   []Poor    Tolerance of evaluation/treatment:    []Excellent   [x]Good    []Fair   []Poor     Physical Therapy Evaluation Complexity Justification  [x] A history of present problem with:  [] no personal factors and/or comorbidities that impact the plan of care;  [x]1-2 personal factors and/or comorbidities that impact the plan of care  []3 personal factors and/or comorbidities that impact the plan of care  [x] An examination of body systems using standardized tests and measures addressing any of the following: body structures and functions (impairments), activity limitations, and/or participation restrictions;:  [] a total of 1-2 or more elements   [x] a total of 3 or more elements   [] a total of 4 or more elements   [x] A clinical presentation with:  [x] stable and/or uncomplicated characteristics   [] evolving clinical presentation with changing characteristics  [] unstable and unpredictable characteristics;   [x] Clinical decision making of [] low, [] moderate, [] high complexity using standardized patient assessment instrument and/or measurable assessment of functional outcome. [x] EVAL (LOW) 55027 (typically 20 minutes face-to-face)  [] EVAL (MOD) 99389 (typically 30 minutes face-to-face)  [] EVAL (HIGH) 89088 (typically 45 minutes face-to-face)  [] RE-EVAL     PLAN: Begin PT focusing on: proximal hip mobilizations, LB mobs, LB core activation, proximal hip activation, and HEP    Frequency/Duration:  1-2 days per week for 6 Weeks:  Interventions:  [x]  Therapeutic exercise including: strength training, ROM, for LE, Glutes and core   [x]  NMR activation and proprioception for glutes , LE and Core   [x]  Manual therapy as indicated for Hip complex, LE and spine to include: Dry Needling/IASTM, STM, PROM, Gr I-IV mobilizations, manipulation.    [x]  Modalities as needed that may include: thermal agents, E-stim, Biofeedback, US, iontophoresis as indicated  [x]  Patient education on joint protection, postural re-education, activity modification, progression of HEP. HEP instruction:   Access Code: [email or web address]  URL: [email or web address]  Date: 10/17/2022  Prepared by: Tani Corporal    Exercises  Supine Bridge - 1 x daily - 7 x weekly - 10 reps - 5 hold  Supine Lower Trunk Rotation - 1 x daily - 7 x weekly - 1 reps - 60 hold  Supine Single Knee to Chest Stretch - 1 x daily - 7 x weekly - 1 reps - 60 hold  Seated Flexion Stretch - 1 x daily - 7 x weekly - 5 reps - 10 hold  Supine Single Bent Knee Fallout - 1 x daily - 7 x weekly - 10 reps      GOALS:  Patient stated goal: decrease pain  [] Progressing: [] Met: [] Not Met: [] Adjusted  Therapist goals for Patient:   Short Term Goals: To be achieved in: 2 weeks  1. Independent in HEP and progression per patient tolerance, in order to prevent re-injury. [] Progressing: [] Met: [] Not Met: [] Adjusted  2. Patient will have a decrease in pain to facilitate improvement in movement, function, and ADLs as indicated by Functional Deficits. [] Progressing: [] Met: [] Not Met: [] Adjusted    Long Term Goals: To be achieved in: 6 weeks  1. FOTO Lumbar will score 58 or greater to assist with reaching prior level of function. [] Progressing: [] Met: [] Not Met: [] Adjusted  2. Patient will demonstrate increased AROM to WNL, good LS mobility, good hip ROM to allow for proper joint functioning as indicated by patients Functional Deficits. [] Progressing: [] Met: [] Not Met: [] Adjusted  3. Patient will demonstrate an increase in Strength to good proximal hip and core activation to allow for proper functional mobility as indicated by patients Functional Deficits. 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 [] Progressing: [] Met: [] Not Met: [] Adjusted  5.  Patient will be able to sleep at night without waking due to pain 5 nights/week. [] Progressing: [] Met: [] Not Met: [] Adjusted     Electronically signed by:  Brina Pal PT , OMT-C, [id number]          Note: If patient does not return for scheduled/recommended follow up visits, this note will serve as a discharge from care along with the most recent update on progress.

## 2022-10-17 NOTE — FLOWSHEET NOTE
[facility], [facility] [address] [place] [address]  Phone: [phone]   Fax:     [phone]    Physical Therapy Treatment Note/ Progress Report:     Date:  10/17/2022    Patient Name:  Walter Romero    :  1955  MRN: [id number]    Pertinent Medical History:      Referring Provider:  Jodee Campbell PA-C                                  Evaluation Date: 10/17/2022                                               Medical Diagnosis:  Low back pain, unspecified [M54.50]     Treatment Diagnosis:      ICD-10-CM     1. Decreased activities of daily living (ADL)  Z78.9                                      Insurance information: PT Insurance Information: Medicare- start at $1,032    Date of Patient follow up with Physician:      Progress Report: []  Yes  [x]  No     Date Range for reporting period:  Beginning: 10/17/22  Ending:    Progress report due (10 Rx/or 30 days whichever is less):      Recertification due (POC duration/ or 90 days whichever is less):     Visit # POC/ Insurance Allowable Auth Needed   1 12 []Yes    []No     Pain level:  3-10/10   Functional Scale: FOTO Lumbar = 44    History of Injury:Patient stated her doctor sent her to PT- \"my hip is totally shot and gave me a shot. \"  Pt stated she was recommended for a total hip replacement but would like to wait through the holidays. Pt was referred to PT for her low back. \"My back is messed up. \" Pt had MRI- \"basically the whole lumbar is messed up, instead of herniated to the side they're front and back. 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 \"  Pt c/o \"this huge knot in my back\" at the L lower lumbar region. Pain Scale: 3-10/10  Easing factors: rest (\"I have Sundays off\"), ice  Provocative factors: sleeping, walking, squatting, stairs, household management, work     Pt had MRI on 22:      1.  There appears to be sacralization of L5.   2. Moderate spinal canal stenosis at L3-L4 and L4-L5.   3. Scattered neural foraminal narrowing as above. 4. Minimal retrolisthesis at L2-L3 with grade 1 anterolisthesis at L4-L5.   5. Question of minimal bone marrow edema associated with L3-L4 facet   arthrosis. SUBJECTIVE:  See eval    OBJECTIVE:   Observation:   Joint mobility: Pain PA L unilateral L3/4 produced at L SIJ              [x]Normal                      []Hypo              []Hyper   Palpation: TTP L3-S1 L unilateral paraspinals/multifidi  Posture: Elevated L innominate   Gait: (include devices/WB status) decreased L step length    Test measurements:    ROM LEFT RIGHT Comments   Lumbar Side Bend WFL, L hip pain WFL     Duke test  Tight, caused spasm            RESTRICTIONS/PRECAUTIONS: CA, R RC repair    Exercises/Interventions:     Therapeutic Ex (20395)   Min: Repetitions/Resistance Notes/Cues                                                Manual Intervention (77551) Min:               NMR re-education (41433)   Min:     Mf Activation- re-ed     TrA Re-ed activation     Glute Max re-ed activation          Therapeutic Activity (05790) Min:               Modalities  Min:             Other Therapeutic Activities:  Pt was educated on PT POC, Diagnosis, Prognosis, pathomechanics as well as frequency and duration of scheduling future physical therapy appointments. Time was also taken on this day to answer all patient questions and participation in PT. Reviewed appointment policy in detail with patient and patient verbalized understanding. Home Exercise Program:  Access Code: [email or web address]  URL: [email or web address]  Date: 10/17/2022  Prepared by: Soledad Hyde     Exercises  Supine Bridge - 1 x daily - 7 x weekly - 10 reps - 5 hold  Supine Lower Trunk Rotation - 1 x daily - 7 x weekly - 1 reps - 60 hold  Supine Single Knee to Chest Stretch - 1 x daily - 7 x weekly - 1 reps - 60 hold  Seated Flexion Stretch - 1 x daily - 7 x weekly - 5 reps - 10 hold  Supine Single Bent Knee Fallout - 1 x daily - 7 x weekly - 10 reps    Therapeutic Exercise and NMR EXR  [] (45183) Provided verbal/tactile cueing for activities related to strengthening, flexibility, endurance, ROM  for improvements in proximal hip and core control with self care, mobility, lifting and ambulation.  [] (56935) Provided verbal/tactile cueing for activities related to improving balance, coordination, kinesthetic sense, posture, motor skill, proprioception  to assist with core control in self care, mobility, lifting, and ambulation. Therapeutic Activities:    [] (18796 or 78681) Provided verbal/tactile cueing for activities related to improving balance, coordination, kinesthetic sense, posture, motor skill, proprioception and motor activation to allow for proper function  with self care and ADLs  [] (29335) Provided training and instruction to the patient for proper core and proximal hip recruitment and positioning with ambulation re-education     Home Exercise Program:    [] (83896) Reviewed/Progressed HEP activities related to strengthening, flexibility, endurance, ROM of core, proximal hip and LE for functional self-care, mobility, lifting and ambulation   [] (26564) Reviewed/Progressed HEP activities related to improving balance, coordination, kinesthetic sense, posture, motor skill, proprioception of core, proximal hip and LE for self care, mobility, lifting, and ambulation      Manual Treatments:  PROM / STM / Oscillations-Mobs:  G-I, II, III, IV (PA's, Inf., Post.)  [] (94069) Provided manual therapy to mobilize proximal hip and LS spine soft tissue/joints for the purpose of modulating pain, promoting relaxation,  increasing ROM, reducing/eliminating soft tissue swelling/inflammation/restriction, improving soft tissue extensibility and allowing for proper ROM for normal function with self care, mobility, lifting and ambulation.      If BWC Please Indicate Time In/Out  CPT Code Time in Time out Approval Dates:  CPT Code Units Approved Units Used  Date Updated:                     Charges:  Timed Code Treatment Minutes: 25   Total Treatment Minutes: 45     [x] EVAL (LOW) 92301 (typically 20 minutes face-to-face)  [] EVAL (MOD) 30149 (typically 30 minutes face-to-face)  [] EVAL (HIGH) 45544 (typically 45 minutes face-to-face)  [] RE-EVAL     [x] JG(37576) x     [] Dry needle 1 or 2 Muscles (83032)  [] NMR (39234) x     [] Dry needle 3+ Muscles (82043)  [x] Manual (81976) x     [] Ultrasound (99808) x  [] TA (28711) x     [] Mech Traction (35089)  [] ES(attended) (20203)     [] ES (un) (57918):   [] Vasopump (89227) [] Ionto (72897)   [] Other:    GOALS:  Patient stated goal: decrease pain  [] Progressing: [] Met: [] Not Met: [] Adjusted  Therapist goals for Patient:   Short Term Goals: To be achieved in: 2 weeks  1. Independent in HEP and progression per patient tolerance, in order to prevent re-injury. [] Progressing: [] Met: [] Not Met: [] Adjusted  2. Patient will have a decrease in pain to facilitate improvement in movement, function, and ADLs as indicated by Functional Deficits. [] Progressing: [] Met: [] Not Met: [] Adjusted     Long Term Goals: To be achieved in: 6 weeks  1. FOTO Lumbar will score 58 or greater to assist with reaching prior level of function. [] Progressing: [] Met: [] Not Met: [] Adjusted  2. Patient will demonstrate increased AROM to WNL, good LS mobility, good hip ROM to allow for proper joint functioning as indicated by patients Functional Deficits. [] Progressing: [] Met: [] Not Met: [] Adjusted  3. Patient will demonstrate an increase in Strength to good proximal hip and core activation to allow for proper functional mobility as indicated by patients Functional Deficits. 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    [] Progressing: [] Met: [] Not Met: [] Adjusted  5. Patient will be able to sleep at night without waking due to pain 5 nights/week. [] Progressing: [] Met: [] Not Met: [] Adjusted       ASSESSMENT:  See eval    Treatment/Activity Tolerance:  [x] Patient tolerated treatment well [] Patient limited by fatique  [] Patient limited by pain  [] Patient limited by other medical complications  [] Other:     Overall Progression Towards Functional goals/ Treatment Progress Update:  [] Patient is progressing as expected towards functional goals listed. [] Progression is slowed due to complexities/Impairments listed. [] Progression has been slowed due to co-morbidities. [x] Plan just implemented, too soon to assess goals progression <30days   [] Goals require adjustment due to lack of progress  [] Patient is not progressing as expected and requires additional follow up with physician  [] Other:    Prognosis for POC: [x] Good [] Fair  [] Poor    Patient requires continued skilled intervention: [x] Yes  [] No        PLAN: See eval. Focus hook lying and prone ab stab, manual L lower lumbar  [] Continue per plan of care [] Alter current plan (see comments)  [x] Plan of care initiated [] Hold pending MD visit [] Discharge    Electronically signed by: Samuel Hui PT , OMT-C, [id number]      Note: If patient does not return for scheduled/recommended follow up visits, this note will serve as a discharge from care along with the most recent update on progress.

## 2022-10-24 ENCOUNTER — HOSPITAL ENCOUNTER (OUTPATIENT)
Dept: PHYSICAL THERAPY | Age: 67
Setting detail: THERAPIES SERIES
Discharge: HOME | End: 2022-10-24
Payer: MEDICARE

## 2022-10-24 PROCEDURE — 97140 MANUAL THERAPY 1/> REGIONS: CPT

## 2022-10-24 PROCEDURE — 97110 THERAPEUTIC EXERCISES: CPT

## 2022-10-24 NOTE — FLOWSHEET NOTE
[facility], [facility] [address] [address]  Phone: [phone]   Fax:     [phone]    Physical Therapy Treatment Note/ Progress Report:     Date:  10/24/2022    Patient Name:  Delvis Barrera    :  1955  MRN: [id number]    Pertinent Medical History:      Referring Provider:  Ward Freire PA-C                                  Evaluation Date: 10/17/2022                                               Medical Diagnosis:  Low back pain, unspecified [M54.50]     Treatment Diagnosis:      ICD-10-CM     1. Decreased activities of daily living (ADL)  Z78.9                                      Insurance information: PT Insurance Information: Medicare- start at $1,032    Date of Patient follow up with Physician:      Progress Report: []  Yes  [x]  No     Date Range for reporting period:  Beginning: 10/17/22  Ending:    Progress report due (10 Rx/or 30 days whichever is less):      Recertification due (POC duration/ or 90 days whichever is less):     Visit # POC/ Insurance Allowable Auth Needed   2 12 []Yes    []No     Pain level:  3-10/10   Functional Scale: FOTO Lumbar = 44    History of Injury:Patient stated her doctor sent her to PT- \"my hip is totally shot and gave me a shot. \"  Pt stated she was recommended for a total hip replacement but would like to wait through the holidays. Pt was referred to PT for her low back. \"My back is messed up. \" Pt had MRI- \"basically the whole lumbar is messed up, instead of herniated to the side they're front and back. 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 \"  Pt c/o \"this huge knot in my back\" at the L lower lumbar region. Pain Scale: 3-10/10  Easing factors: rest (\"I have Sundays off\"), ice  Provocative factors: sleeping, walking, squatting, stairs, household management, work     Pt had MRI on 22:      1.  There appears to be sacralization of L5.   2. Moderate spinal canal stenosis at L3-L4 and L4-L5.   3. Scattered neural foraminal narrowing as above. 4. Minimal retrolisthesis at L2-L3 with grade 1 anterolisthesis at L4-L5.   5. Question of minimal bone marrow edema associated with L3-L4 facet   arthrosis. SUBJECTIVE:  See eval  10/24/22 pt stated STM helped last session, was able to stand up straight. Pt had 5 days of pain relief until working at [employer] on Saturday. 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 Pt climbed a step ladder a lot today. OBJECTIVE:   Observation:   Joint mobility: Pain PA L unilateral L3/4 produced at L SIJ              [x]Normal                      []Hypo              []Hyper   Palpation: TTP L3-S1 L unilateral paraspinals/multifidi  Posture: Elevated L innominate   Gait: (include devices/WB status) decreased L step length    Test measurements:    ROM LEFT RIGHT Comments   Lumbar Side Bend WFL, L hip pain WFL     Duke test  Tight, caused spasm            RESTRICTIONS/PRECAUTIONS: CA, R RC repair    Exercises/Interventions:     Therapeutic Ex (17548)   Min: 20 Repetitions/Resistance Notes/Cues   Nu Step 5'    Hook lying ab stab  Fall outs   10x L         Clam shells 10x L         Prone ab stab          Piriformis stretch 2x30\" L    LTR stretch 2x30\" L/R    Manual Intervention ([address] [place] [address]) Min: 25     STM L Lumbar 15' Performed on  10/17/22   STM L piriformis 10'    NMR re-education (87369)   Min:     Mf Activation- re-ed     TrA Re-ed activation     Glute Max re-ed activation          Therapeutic Activity (68180) Min:               Modalities  Min:             Other Therapeutic Activities:  Pt was educated on PT POC, Diagnosis, Prognosis, pathomechanics as well as frequency and duration of scheduling future physical therapy appointments. Time was also taken on this day to answer all patient questions and participation in PT. Reviewed appointment policy in detail with patient and patient verbalized understanding.      Home Exercise Program:  Access Code: [email or web address]  URL: [email or web address]  Date: 10/17/2022  Prepared by: Neel Reilly     Exercises  Supine Bridge - 1 x daily - 7 x weekly - 10 reps - 5 hold  Supine Lower Trunk Rotation - 1 x daily - 7 x weekly - 1 reps - 60 hold  Supine Single Knee to Chest Stretch - 1 x daily - 7 x weekly - 1 reps - 60 hold  Seated Flexion Stretch - 1 x daily - 7 x weekly - 5 reps - 10 hold  Supine Single Bent Knee Fallout - 1 x daily - 7 x weekly - 10 reps    Therapeutic Exercise and NMR EXR  [] (89173) Provided verbal/tactile cueing for activities related to strengthening, flexibility, endurance, ROM  for improvements in proximal hip and core control with self care, mobility, lifting and ambulation.  [] (64338) Provided verbal/tactile cueing for activities related to improving balance, coordination, kinesthetic sense, posture, motor skill, proprioception  to assist with core control in self care, mobility, lifting, and ambulation.      Therapeutic Activities:    [] (94881 or 45059) Provided verbal/tactile cueing for activities related to improving balance, coordination, kinesthetic sense, posture, motor skill, proprioception and motor activation to allow for proper function  with self care and ADLs  [] (04380) Provided training and instruction to the patient for proper core and proximal hip recruitment and positioning with ambulation re-education     Home Exercise Program:    [] (01055) Reviewed/Progressed HEP activities related to strengthening, flexibility, endurance, ROM of core, proximal hip and LE for functional self-care, mobility, lifting and ambulation   [] (07832) Reviewed/Progressed HEP activities related to improving balance, coordination, kinesthetic sense, posture, motor skill, proprioception of core, proximal hip and LE for self care, mobility, lifting, and ambulation      Manual Treatments:  PROM / STM / Oscillations-Mobs:  G-I, II, III, IV (PA's, Inf., Post.)  [] (40668) Provided manual therapy to mobilize proximal hip and LS spine soft tissue/joints for the purpose of modulating pain, promoting relaxation,  increasing ROM, reducing/eliminating soft tissue swelling/inflammation/restriction, improving soft tissue extensibility and allowing for proper ROM for normal function with self care, mobility, lifting and ambulation. Charges:  Timed Code Treatment Minutes: 45   Total Treatment Minutes: 45     [] EVAL (LOW) 55443 (typically 20 minutes face-to-face)  [] EVAL (MOD) 41460 (typically 30 minutes face-to-face)  [] EVAL (HIGH) 98873 (typically 45 minutes face-to-face)  [] RE-EVAL     [x] DZ(61287) x     [] Dry needle 1 or 2 Muscles (77177)  [] NMR (16484) x     [] Dry needle 3+ Muscles (95718)  [x] Manual (42048) x  2   [] Ultrasound (71186) x  [] TA (78600) x     [] Mech Traction (09935)  [] ES(attended) (15654)     [] ES (un) (06870):   [] Vasopump (98983) [] Ionto (36058)   [] Other:    GOALS:  Patient stated goal: decrease pain  [] Progressing: [] Met: [] Not Met: [] Adjusted  Therapist goals for Patient:   Short Term Goals: To be achieved in: 2 weeks  1. Independent in HEP and progression per patient tolerance, in order to prevent re-injury. [] Progressing: [] Met: [] Not Met: [] Adjusted  2. Patient will have a decrease in pain to facilitate improvement in movement, function, and ADLs as indicated by Functional Deficits. [] Progressing: [] Met: [] Not Met: [] Adjusted     Long Term Goals: To be achieved in: 6 weeks  1. FOTO Lumbar will score 58 or greater to assist with reaching prior level of function. [] Progressing: [] Met: [] Not Met: [] Adjusted  2. Patient will demonstrate increased AROM to WNL, good LS mobility, good hip ROM to allow for proper joint functioning as indicated by patients Functional Deficits. [] Progressing: [] Met: [] Not Met: [] Adjusted  3.  Patient will demonstrate an increase in Strength to good proximal hip and core activation to allow for proper functional mobility as indicated by patients Functional Deficits. 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 [] Progressing: [] Met: [] Not Met: [] Adjusted  5. Patient will be able to sleep at night without waking due to pain 5 nights/week. [] Progressing: [] Met: [] Not Met: [] Adjusted       ASSESSMENT:  See eval  Pain L side only; will need assessment of L iliopsoas    Treatment/Activity Tolerance:  [x] Patient tolerated treatment well [] Patient limited by fatique  [] Patient limited by pain  [] Patient limited by other medical complications  [] Other:     Overall Progression Towards Functional goals/ Treatment Progress Update:  [] Patient is progressing as expected towards functional goals listed. [] Progression is slowed due to complexities/Impairments listed. [] Progression has been slowed due to co-morbidities. [x] Plan just implemented, too soon to assess goals progression <30days   [] Goals require adjustment due to lack of progress  [] Patient is not progressing as expected and requires additional follow up with physician  [] Other:    Prognosis for POC: [x] Good [] Fair  [] Poor    Patient requires continued skilled intervention: [x] Yes  [] No        PLAN: Assess L iliopsoas. Focus hook lying and prone ab stab, manual L lower lumbar  [] Continue per plan of care [] Alter current plan (see comments)  [x] Plan of care initiated [] Hold pending MD visit [] Discharge    Electronically signed by: Monica Collier PT , OMMICHELINE-C, [id number]      Note: If patient does not return for scheduled/recommended follow up visits, this note will serve as a discharge from care along with the most recent update on progress.

## 2022-11-04 ENCOUNTER — HOSPITAL ENCOUNTER (OUTPATIENT)
Dept: PHYSICAL THERAPY | Age: 67
Setting detail: THERAPIES SERIES
Discharge: HOME | End: 2022-11-04
Payer: MEDICARE

## 2022-11-04 PROCEDURE — 97140 MANUAL THERAPY 1/> REGIONS: CPT

## 2022-11-04 PROCEDURE — 97110 THERAPEUTIC EXERCISES: CPT

## 2022-11-04 NOTE — FLOWSHEET NOTE
[facility], [facility] [address] [address]  Phone: [phone]   Fax:     [phone]    Physical Therapy Treatment Note/ Progress Report:     Date:  2022    Patient Name:  Joey Wylie    :  1955  MRN: [id number]    Pertinent Medical History:      Referring Provider:  Viktor Valdivia PA-C                                  Evaluation Date: 10/17/2022                                               Medical Diagnosis:  Low back pain, unspecified [M54.50]     Treatment Diagnosis:      ICD-10-CM     1. Decreased activities of daily living (ADL)  Z78.9                                      Insurance information: PT Insurance Information: Medicare- start at $1,032    Date of Patient follow up with Physician:      Progress Report: []  Yes  [x]  No     Date Range for reporting period:  Beginning: 10/17/22  Ending:    Progress report due (10 Rx/or 30 days whichever is less):      Recertification due (POC duration/ or 90 days whichever is less):     Visit # POC/ Insurance Allowable Auth Needed   3 12 []Yes    []No     Pain level:  3-10/10   Functional Scale: FOTO Lumbar = 10/17/22 44; 22 51    History of Injury:Patient stated her doctor sent her to PT- \"my hip is totally shot and gave me a shot. \"  Pt stated she was recommended for a total hip replacement but would like to wait through the holidays. Pt was referred to PT for her low back. \"My back is messed up. \" Pt had MRI- \"basically the whole lumbar is messed up, instead of herniated to the side they're front and back. 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 \"  Pt c/o \"this huge knot in my back\" at the L lower lumbar region. Pain Scale: 3-10/10  Easing factors: rest (\"I have Sundays off\"), ice  Provocative factors: sleeping, walking, squatting, stairs, household management, work     Pt had MRI on 22:      1. There appears to be sacralization of L5. 2. Moderate spinal canal stenosis at L3-L4 and L4-L5.   3. Scattered neural foraminal narrowing as above. 4. Minimal retrolisthesis at L2-L3 with grade 1 anterolisthesis at L4-L5.   5. Question of minimal bone marrow edema associated with L3-L4 facet   arthrosis. SUBJECTIVE:  See eval  10/24/22 pt stated STM helped last session, was able to stand up straight. Pt had 5 days of pain relief until working at [employer] on Saturday. 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 Pt climbed a step ladder a lot today. 11/4/22 back is less stiff    OBJECTIVE:   Observation:   Joint mobility: Pain PA L unilateral L3/4 produced at L SIJ              [x]Normal                      []Hypo              []Hyper   Palpation: TTP L3-S1 L unilateral paraspinals/multifidi  Posture: Elevated L innominate   Gait: (include devices/WB status) decreased L step length    Test measurements:    ROM LEFT RIGHT Comments   Lumbar Side Bend WFL, L hip pain WFL     Duke test  Tight, caused spasm            RESTRICTIONS/PRECAUTIONS: CA, R RC repair    Exercises/Interventions:     Therapeutic Ex (61020)   Min: 25 Repetitions/Resistance Notes/Cues   Nu Step 5'    Hook lying ab stab  Fall outs   10x L         Clam shells 10x2 L    Bridge with ADD set 10x    Prone ab stab          Piriformis stretch 2x30\" L and R     LTR stretch 2x30\" L/R    Manual Intervention (12349) Min: 2     STM L Lumbar 15' Performed on  10/17/22   L lumbar flexion mob Grade III    STM L piriformis 10'    NMR re-education (05530)   Min:     Mf Activation- re-ed     TrA Re-ed activation     Glute Max re-ed activation          Therapeutic Activity (05552) Min:               Modalities  Min:             Other Therapeutic Activities:  Pt was educated on PT POC, Diagnosis, Prognosis, pathomechanics as well as frequency and duration of scheduling future physical therapy appointments.  Time was also taken on this day to answer all patient questions and participation in PT. Reviewed appointment policy in detail with patient and patient verbalized understanding. Home Exercise Program:  Access Code: [email or web address]  URL: [email or web address]  Date: 10/17/2022  Prepared by: Joshua Francisco     Exercises  Supine Bridge - 1 x daily - 7 x weekly - 10 reps - 5 hold  Supine Lower Trunk Rotation - 1 x daily - 7 x weekly - 1 reps - 60 hold  Supine Single Knee to Chest Stretch - 1 x daily - 7 x weekly - 1 reps - 60 hold  Seated Flexion Stretch - 1 x daily - 7 x weekly - 5 reps - 10 hold  Supine Single Bent Knee Fallout - 1 x daily - 7 x weekly - 10 reps    Therapeutic Exercise and NMR EXR  [] (63276) Provided verbal/tactile cueing for activities related to strengthening, flexibility, endurance, ROM  for improvements in proximal hip and core control with self care, mobility, lifting and ambulation.  [] (73166) Provided verbal/tactile cueing for activities related to improving balance, coordination, kinesthetic sense, posture, motor skill, proprioception  to assist with core control in self care, mobility, lifting, and ambulation.      Therapeutic Activities:    [] (46892 or 72359) Provided verbal/tactile cueing for activities related to improving balance, coordination, kinesthetic sense, posture, motor skill, proprioception and motor activation to allow for proper function  with self care and ADLs  [] (88761) Provided training and instruction to the patient for proper core and proximal hip recruitment and positioning with ambulation re-education     Home Exercise Program:    [] (42907) Reviewed/Progressed HEP activities related to strengthening, flexibility, endurance, ROM of core, proximal hip and LE for functional self-care, mobility, lifting and ambulation   [] (82598) Reviewed/Progressed HEP activities related to improving balance, coordination, kinesthetic sense, posture, motor skill, proprioception of core, proximal hip and LE for self care, mobility, lifting, and ambulation      Manual Treatments:  PROM / STM / Oscillations-Mobs:  G-I, II, III, IV (PA's, Inf., Post.)  [] (31746) Provided manual therapy to mobilize proximal hip and LS spine soft tissue/joints for the purpose of modulating pain, promoting relaxation,  increasing ROM, reducing/eliminating soft tissue swelling/inflammation/restriction, improving soft tissue extensibility and allowing for proper ROM for normal function with self care, mobility, lifting and ambulation. Charges:  Timed Code Treatment Minutes: 45   Total Treatment Minutes: 45     [] EVAL (LOW) 78128 (typically 20 minutes face-to-face)  [] EVAL (MOD) 44573 (typically 30 minutes face-to-face)  [] EVAL (HIGH) 33782 (typically 45 minutes face-to-face)  [] RE-EVAL     [x] PU(57891) x     [] Dry needle 1 or 2 Muscles (70652)  [] NMR (21203) x     [] Dry needle 3+ Muscles (34995)  [x] Manual (84473) x  2   [] Ultrasound (06730) x  [] TA (21986) x     [] Mech Traction (11053)  [] ES(attended) (85595)     [] ES (un) (88178):   [] Vasopump (13843) [] Ionto (52056)   [] Other:    GOALS:  Patient stated goal: decrease pain  [] Progressing: [] Met: [] Not Met: [] Adjusted  Therapist goals for Patient:   Short Term Goals: To be achieved in: 2 weeks  1. Independent in HEP and progression per patient tolerance, in order to prevent re-injury. [] Progressing: [] Met: [] Not Met: [] Adjusted  2. Patient will have a decrease in pain to facilitate improvement in movement, function, and ADLs as indicated by Functional Deficits. [] Progressing: [] Met: [] Not Met: [] Adjusted     Long Term Goals: To be achieved in: 6 weeks  1. FOTO Lumbar will score 58 or greater to assist with reaching prior level of function. [] Progressing: [] Met: [] Not Met: [] Adjusted  2. Patient will demonstrate increased AROM to WNL, good LS mobility, good hip ROM to allow for proper joint functioning as indicated by patients Functional Deficits. [] Progressing: [] Met: [] Not Met: [] Adjusted  3.  Patient will demonstrate an increase in Strength to good proximal hip and core activation to allow for proper functional mobility as indicated by patients Functional Deficits. 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 [] Progressing: [] Met: [] Not Met: [] Adjusted  5. Patient will be able to sleep at night without waking due to pain 5 nights/week. [] Progressing: [] Met: [] Not Met: [] Adjusted       ASSESSMENT:  See eval  Pain L side only; will need assessment of L iliopsoas    Treatment/Activity Tolerance:  [x] Patient tolerated treatment well [] Patient limited by fatique  [] Patient limited by pain  [] Patient limited by other medical complications  [] Other:     Overall Progression Towards Functional goals/ Treatment Progress Update:  [] Patient is progressing as expected towards functional goals listed. [] Progression is slowed due to complexities/Impairments listed. [] Progression has been slowed due to co-morbidities. [x] Plan just implemented, too soon to assess goals progression <30days   [] Goals require adjustment due to lack of progress  [] Patient is not progressing as expected and requires additional follow up with physician  [] Other:    Prognosis for POC: [x] Good [] Fair  [] Poor    Patient requires continued skilled intervention: [x] Yes  [] No        PLAN: Assess L iliopsoas. Focus hook lying and prone ab stab, manual L lower lumbar  [] Continue per plan of care [] Alter current plan (see comments)  [x] Plan of care initiated [] Hold pending MD visit [] Discharge    Electronically signed by: Beatriz Juarez PT , OMMICHELINE-C, [id number]      Note: If patient does not return for scheduled/recommended follow up visits, this note will serve as a discharge from care along with the most recent update on progress.

## 2022-11-11 ENCOUNTER — HOSPITAL ENCOUNTER (OUTPATIENT)
Dept: PHYSICAL THERAPY | Age: 67
Setting detail: THERAPIES SERIES
Discharge: HOME | End: 2022-11-11
Payer: MEDICARE

## 2022-11-11 PROCEDURE — 97140 MANUAL THERAPY 1/> REGIONS: CPT

## 2022-11-11 PROCEDURE — 97110 THERAPEUTIC EXERCISES: CPT

## 2022-11-11 NOTE — FLOWSHEET NOTE
[facility], [facility] [address] [place] [address]  Phone: [phone]   Fax:     [phone]    Physical Therapy Treatment Note/ Progress Report:     Date:  2022    Patient Name:  Delvis Barrera    :  1955  MRN: [id number]    Pertinent Medical History:      Referring Provider:  Ward Freire PA-C                                  Evaluation Date: 10/17/2022                                               Medical Diagnosis:  Low back pain, unspecified [M54.50]     Treatment Diagnosis:      ICD-10-CM     1. Decreased activities of daily living (ADL)  Z78.9                                      Insurance information: PT Insurance Information: Medicare- start at $1,032    Date of Patient follow up with Physician:      Progress Report: []  Yes  [x]  No     Date Range for reporting period:  Beginning: 10/17/22  Ending:    Progress report due (10 Rx/or 30 days whichever is less):      Recertification due (POC duration/ or 90 days whichever is less):     Visit # POC/ Insurance Allowable Auth Needed   4 12 []Yes    []No     Pain level:  3-10/10   Functional Scale: FOTO Lumbar = 10/17/22 44; 22 51    History of Injury:Patient stated her doctor sent her to PT- \"my hip is totally shot and gave me a shot. \"  Pt stated she was recommended for a total hip replacement but would like to wait through the holidays. Pt was referred to PT for her low back. \"My back is messed up. \" Pt had MRI- \"basically the whole lumbar is messed up, instead of herniated to the side they're front and back. 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 \"  Pt c/o \"this huge knot in my back\" at the L lower lumbar region. Pain Scale: 3-10/10  Easing factors: rest (\"I have Sundays off\"), ice  Provocative factors: sleeping, walking, squatting, stairs, household management, work     Pt had MRI on 22:      1. There appears to be sacralization of L5. 2. Moderate spinal canal stenosis at L3-L4 and L4-L5.   3. Scattered neural foraminal narrowing as above. 4. Minimal retrolisthesis at L2-L3 with grade 1 anterolisthesis at L4-L5.   5. Question of minimal bone marrow edema associated with L3-L4 facet   arthrosis. SUBJECTIVE:  See eval  10/24/22 pt stated STM helped last session, was able to stand up straight. Pt had 5 days of pain relief until working at [employer] on Saturday. 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 Pt climbed a step ladder a lot today. 11/4/22 back is less stiff  11/11/22 pt stated \"this knot is going away, this is helping tremendously. \"    Pt worked last Saturday night and had new pains, located at the L groin (same hip region that is considered for a ROBERT in the future). Pain went away 2 days later [facility] everything felt much stronger. \"    OBJECTIVE:   Observation:   Joint mobility: Pain PA L unilateral L3/4 produced at L SIJ              [x]Normal                      []Hypo              []Hyper   Palpation: TTP L3-S1 L unilateral paraspinals/multifidi  Posture: Elevated L innominate   Gait: (include devices/WB status) decreased L step length    Test measurements:    ROM LEFT  10/24/22 RIGHT  10/24/22 11/11/22   Lumbar Side Bend WFL, L hip pain WFL WFL, L hip pain   Duke test  Tight, caused spasm            RESTRICTIONS/PRECAUTIONS: CA, R RC repair    Exercises/Interventions:     Therapeutic Ex (39062)   Min: 15 Repetitions/Resistance Notes/Cues   Nu Step 5'    Hook lying ab stab  Dead bug   10x L         Clam shells 10x2 L    Bridge with ADD set  Bridge with ABD set 10x  Green band, 10x    Prone ab stab          Piriformis stretch 2x30\" L and R     LTR stretch 2x30\" L/R    Manual Intervention ([phone]) Min: 30     STM L Lumbar 15'    Mob T12/L1 In L SL grade IV    L lumbar flexion mob Grade III    STM L piriformis 10'    NMR re-education (89188)   Min:     Mf Activation- re-ed     TrA Re-ed activation     Glute Max re-ed activation          Therapeutic Activity (99688) Min:               Modalities  Min:             Other Therapeutic Activities:  Pt was educated on PT POC, Diagnosis, Prognosis, pathomechanics as well as frequency and duration of scheduling future physical therapy appointments. Time was also taken on this day to answer all patient questions and participation in PT. Reviewed appointment policy in detail with patient and patient verbalized understanding. Home Exercise Program:  Access Code: [email or web address]  URL: [email or web address]  Date: 10/17/2022  Prepared by: Sara Dorantes     Exercises  Supine Bridge - 1 x daily - 7 x weekly - 10 reps - 5 hold  Supine Lower Trunk Rotation - 1 x daily - 7 x weekly - 1 reps - 60 hold  Supine Single Knee to Chest Stretch - 1 x daily - 7 x weekly - 1 reps - 60 hold  Seated Flexion Stretch - 1 x daily - 7 x weekly - 5 reps - 10 hold  Supine Single Bent Knee Fallout - 1 x daily - 7 x weekly - 10 reps    Therapeutic Exercise and NMR EXR  [] (55190) Provided verbal/tactile cueing for activities related to strengthening, flexibility, endurance, ROM  for improvements in proximal hip and core control with self care, mobility, lifting and ambulation.  [] (13601) Provided verbal/tactile cueing for activities related to improving balance, coordination, kinesthetic sense, posture, motor skill, proprioception  to assist with core control in self care, mobility, lifting, and ambulation.      Therapeutic Activities:    [] (41165 or 10731) Provided verbal/tactile cueing for activities related to improving balance, coordination, kinesthetic sense, posture, motor skill, proprioception and motor activation to allow for proper function  with self care and ADLs  [] (90958) Provided training and instruction to the patient for proper core and proximal hip recruitment and positioning with ambulation re-education     Home Exercise Program:    [] (26041) Reviewed/Progressed HEP activities related to strengthening, flexibility, endurance, ROM of core, proximal hip and LE for functional self-care, mobility, lifting and ambulation   [] (56821) Reviewed/Progressed HEP activities related to improving balance, coordination, kinesthetic sense, posture, motor skill, proprioception of core, proximal hip and LE for self care, mobility, lifting, and ambulation      Manual Treatments:  PROM / STM / Oscillations-Mobs:  G-I, II, III, IV (PA's, Inf., Post.)  [] (42133) Provided manual therapy to mobilize proximal hip and LS spine soft tissue/joints for the purpose of modulating pain, promoting relaxation,  increasing ROM, reducing/eliminating soft tissue swelling/inflammation/restriction, improving soft tissue extensibility and allowing for proper ROM for normal function with self care, mobility, lifting and ambulation. Charges:  Timed Code Treatment Minutes: 45   Total Treatment Minutes: 45     [] EVAL (LOW) 94296 (typically 20 minutes face-to-face)  [] EVAL (MOD) 50605 (typically 30 minutes face-to-face)  [] EVAL (HIGH) 81920 (typically 45 minutes face-to-face)  [] RE-EVAL     [x] LM(21922) x     [] Dry needle 1 or 2 Muscles (65455)  [] NMR (25800) x     [] Dry needle 3+ Muscles (04920)  [x] Manual (70389) x  2   [] Ultrasound (92974) x  [] TA (68397) x     [] Mech Traction (02664)  [] ES(attended) (04586)     [] ES (un) (29104):   [] Vasopump (56809) [] Ionto (91569)   [] Other:    GOALS:  Patient stated goal: decrease pain  [] Progressing: [] Met: [] Not Met: [] Adjusted  Therapist goals for Patient:   Short Term Goals: To be achieved in: 2 weeks  1. Independent in HEP and progression per patient tolerance, in order to prevent re-injury. [] Progressing: [] Met: [] Not Met: [] Adjusted  2. Patient will have a decrease in pain to facilitate improvement in movement, function, and ADLs as indicated by Functional Deficits. [] Progressing: [] Met: [] Not Met: [] Adjusted     Long Term Goals:  To be achieved in: 6 weeks  1. FOTO Lumbar will score 58 or greater to assist with reaching prior level of function. [] Progressing: [] Met: [] Not Met: [] Adjusted  2. Patient will demonstrate increased AROM to WNL, good LS mobility, good hip ROM to allow for proper joint functioning as indicated by patients Functional Deficits. [] Progressing: [] Met: [] Not Met: [] Adjusted  3. Patient will demonstrate an increase in Strength to good proximal hip and core activation to allow for proper functional mobility as indicated by patients Functional Deficits. 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 [] Progressing: [] Met: [] Not Met: [] Adjusted  5. Patient will be able to sleep at night without waking due to pain 5 nights/week. [] Progressing: [] Met: [] Not Met: [] Adjusted       ASSESSMENT:  See eval  Pain L side only; will need assessment of L iliopsoas    Treatment/Activity Tolerance:  [x] Patient tolerated treatment well [] Patient limited by fatique  [] Patient limited by pain  [] Patient limited by other medical complications  [] Other:     Overall Progression Towards Functional goals/ Treatment Progress Update:  [] Patient is progressing as expected towards functional goals listed. [] Progression is slowed due to complexities/Impairments listed. [] Progression has been slowed due to co-morbidities. [x] Plan just implemented, too soon to assess goals progression <30days   [] Goals require adjustment due to lack of progress  [] Patient is not progressing as expected and requires additional follow up with physician  [] Other:    Prognosis for POC: [x] Good [] Fair  [] Poor    Patient requires continued skilled intervention: [x] Yes  [] No        PLAN: Assess L iliopsoas.  Focus hook lying and prone ab stab, manual L lower lumbar  [] Continue per plan of care [] Alter current plan (see comments)  [x] Plan of care initiated [] Hold pending MD visit [] Discharge    Electronically signed by: Danny Tan PT , ALLYSON, [id number]      Note: If patient does not return for scheduled/recommended follow up visits, this note will serve as a discharge from care along with the most recent update on progress.

## 2022-12-02 ENCOUNTER — HOSPITAL ENCOUNTER (OUTPATIENT)
Dept: PHYSICAL THERAPY | Age: 67
Setting detail: THERAPIES SERIES
Discharge: HOME | End: 2022-12-02
Payer: MEDICARE

## 2022-12-02 PROCEDURE — 97140 MANUAL THERAPY 1/> REGIONS: CPT

## 2022-12-02 PROCEDURE — 97110 THERAPEUTIC EXERCISES: CPT

## 2022-12-02 NOTE — FLOWSHEET NOTE
Physical Therapy Re-Certification Plan of Care/MD UPDATE      Dear Ward Freire PA-C,    We had the pleasure of treating the following patient for physical therapy services at [address] [place] [address]. A summary of our findings can be found in the updated assessment below. This includes our plan of care. If you have any questions or concerns regarding these findings, please do not hesitate to contact me at the office phone number checked above. Thank you for the referral.     Physician Signature:________________________________Date:__________________  By signing above (or electronic signature), therapists plan is approved by physician    Date Range Of Visits: 10/17/22-22  Total Visits to Date: 5  Overall Response to Treatment:   []Patient is responding well to treatment and improvement is noted with regards  to goals   []Patient should continue to improve in reasonable time if they continue HEP   []Patient has plateaued and is no longer responding to skilled PT intervention    []Patient is getting worse and would benefit from return to referring MD   []Patient unable to adhere to initial POC   [x]Other: pain is decreasing at the low back, tolerates work better, but pain continues. Recommendation:    [x]Continue PT 1-2x / wk for 4-6 weeks. []Hold PT, pending MD visit                                                    [facility], [address]  [place] [address]  Phone: [phone]   Fax:     [phone]    Physical Therapy Treatment Note/ Progress Report:     Date:  2022    Patient Name:  Delvis Barrera    :  1955  MRN: [id number]    Pertinent Medical History:      Referring Provider:  Ward Freire PA-C                                  Evaluation Date: 10/17/2022                                               Medical Diagnosis:  Low back pain, unspecified [M54.50]     Treatment Diagnosis: ICD-10-CM     1. Decreased activities of daily living (ADL)  Z78.9                                      Insurance information: PT Insurance Information: Medicare- start at $1,032    Plan of Care signed: Yes    Date of Patient follow up with Physician:      Progress Report: []  Yes  [x]  No     Date Range for reporting period:  Beginning: 10/17/22  Ending:    Progress report due (10 Rx/or 30 days whichever is less):      Recertification due (POC duration/ or 90 days whichever is less):     Visit # POC/ Insurance Allowable Auth Needed   5 12 []Yes    []No     Pain level:  3-10/10   Functional Scale: FOTO Lumbar = 10/17/22 44; 11/4/22 51; 12/2/22 57      History of Injury:Patient stated her doctor sent her to PT- \"my hip is totally shot and gave me a shot. \"  Pt stated she was recommended for a total hip replacement but would like to wait through the holidays. Pt was referred to PT for her low back. \"My back is messed up. \" Pt had MRI- \"basically the whole lumbar is messed up, instead of herniated to the side they're front and back. 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 \"  Pt c/o \"this huge knot in my back\" at the L lower lumbar region. Pain Scale: 3-10/10  Easing factors: rest (\"I have Sundays off\"), ice  Provocative factors: sleeping, walking, squatting, stairs, household management, work     Pt had MRI on 9/8/22:      1. There appears to be sacralization of L5.   2. Moderate spinal canal stenosis at L3-L4 and L4-L5.   3. Scattered neural foraminal narrowing as above. 4. Minimal retrolisthesis at L2-L3 with grade 1 anterolisthesis at L4-L5.   5. Question of minimal bone marrow edema associated with L3-L4 facet   arthrosis. SUBJECTIVE:  See eval  10/24/22 pt stated STM helped last session, was able to stand up straight. Pt had 5 days of pain relief until working at [employer] on Saturday. 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 Pt climbed a step ladder a lot today.   11/4/22 back is less stiff  11/11/22 pt stated \"this knot is going away, this is helping tremendously. \"    Pt worked last Saturday night and had new pains, located at the L groin (same hip region that is considered for a ROBERT in the future). Pain went away 2 days later [facility] everything felt much stronger. \"  12/2/22 pt stated her pain is decreasing, feels like hip flexors are tight as she is standing more erect. Pt stated her left leg feels like it is longer than the R leg. Pt has been off from PT for 3 weeks. Pt was able to call and schedule more. Pt stated she is making progress; the knot in her back is decreasing, low back pain is decreasing. OBJECTIVE:   Observation:   10/24/22  Joint mobility: Pain PA L unilateral L3/4 produced at L SIJ              [x]Normal                      []Hypo              []Hyper   Palpation: TTP L3-S1 L unilateral paraspinals/multifidi  Posture: Elevated L innominate   Gait: (include devices/WB status) decreased L step length  12/2/22  Joint mobility: no longer had pain with PA testing lumbar central and unilateral testing              [x]Normal                      []Hypo              []Hyper   Palpation: TTP LRS1 L unilateral paraspinals/multifidi  Posture: Elevated L innominate continues;  Leg length discrepancy in supine  Gait: (include devices/WB status) decreased L step length  Flexibility hip flexors- minimal tightness    Test measurements:    ROM LEFT  10/24/22 RIGHT  10/24/22 11/11/22   Lumbar Side Bend WFL, L hip pain WFL WFL, L hip pain   Duke test  Tight, caused spasm            RESTRICTIONS/PRECAUTIONS: CA, R RC repair    Exercises/Interventions:     Therapeutic Ex (51946)   Min: 15 Repetitions/Resistance Notes/Cues   Nu Step 5'    Hook lying ab stab  Dead bug      reassessed check    Clam shells    Bridge with ADD set  Bridge with ABD set 10x  Green band, 10x    Prone ab stab     Quad/hip flexor stretch  Prone  Standing    1x45\" L/R  1x30\" L/R    Piriformis stretch      LTR stretch 2x30\" L/R Manual Intervention (72730) Min: 30 L5/S1 fused    MET to correct the following R descended innominate, R ant rot innominate, L post rot innominate, L on R sacral torsion    Mob T12/L1    L lumbar flexion mob Grade III    STM L piriformis 10'    NMR re-education (51047)   Min:     Mf Activation- re-ed     TrA Re-ed activation     Glute Max re-ed activation          Therapeutic Activity (08459) Min:               Modalities  Min:             Other Therapeutic Activities:  Pt was educated on PT POC, Diagnosis, Prognosis, pathomechanics as well as frequency and duration of scheduling future physical therapy appointments. Time was also taken on this day to answer all patient questions and participation in PT. Reviewed appointment policy in detail with patient and patient verbalized understanding. Home Exercise Program:  Access Code: [email or web address]  URL: [email or web address]  Date: 10/17/2022  Prepared by: Neel Reilly     Exercises  Supine Bridge - 1 x daily - 7 x weekly - 10 reps - 5 hold  Supine Lower Trunk Rotation - 1 x daily - 7 x weekly - 1 reps - 60 hold  Supine Single Knee to Chest Stretch - 1 x daily - 7 x weekly - 1 reps - 60 hold  Seated Flexion Stretch - 1 x daily - 7 x weekly - 5 reps - 10 hold  Supine Single Bent Knee Fallout - 1 x daily - 7 x weekly - 10 reps    Therapeutic Exercise and NMR EXR  [] (98155) Provided verbal/tactile cueing for activities related to strengthening, flexibility, endurance, ROM  for improvements in proximal hip and core control with self care, mobility, lifting and ambulation.  [] (08740) Provided verbal/tactile cueing for activities related to improving balance, coordination, kinesthetic sense, posture, motor skill, proprioception  to assist with core control in self care, mobility, lifting, and ambulation.      Therapeutic Activities:    [] (58531 or 82397) Provided verbal/tactile cueing for activities related to improving balance, coordination, kinesthetic sense, posture, motor skill, proprioception and motor activation to allow for proper function  with self care and ADLs  [] (56545) Provided training and instruction to the patient for proper core and proximal hip recruitment and positioning with ambulation re-education     Home Exercise Program:    [] (77825) Reviewed/Progressed HEP activities related to strengthening, flexibility, endurance, ROM of core, proximal hip and LE for functional self-care, mobility, lifting and ambulation   [] (62317) Reviewed/Progressed HEP activities related to improving balance, coordination, kinesthetic sense, posture, motor skill, proprioception of core, proximal hip and LE for self care, mobility, lifting, and ambulation      Manual Treatments:  PROM / STM / Oscillations-Mobs:  G-I, II, III, IV (PA's, Inf., Post.)  [] (26253) Provided manual therapy to mobilize proximal hip and LS spine soft tissue/joints for the purpose of modulating pain, promoting relaxation,  increasing ROM, reducing/eliminating soft tissue swelling/inflammation/restriction, improving soft tissue extensibility and allowing for proper ROM for normal function with self care, mobility, lifting and ambulation. Charges:  Timed Code Treatment Minutes: 45   Total Treatment Minutes: 45     [] EVAL (LOW) 14804 (typically 20 minutes face-to-face)  [] EVAL (MOD) 24869 (typically 30 minutes face-to-face)  [] EVAL (HIGH) 12100 (typically 45 minutes face-to-face)  [] RE-EVAL     [x] BC(16879) x   2  [] Dry needle 1 or 2 Muscles (03592)  [] NMR (41268) x     [] Dry needle 3+ Muscles (57029)  [x] Manual (31092) x    [] Ultrasound (30793) x  [] TA (02487) x     [] Mech Traction (61061)  [] ES(attended) (05070)     [] ES (un) (08112):   [] Vasopump (63782) [] Ionto (53039)   [] Other:    GOALS:  Patient stated goal: decrease pain  [] Progressing: [] Met: [] Not Met: [] Adjusted  Therapist goals for Patient:   Short Term Goals: To be achieved in: 2 weeks  1.  Independent in HEP and progression per patient tolerance, in order to prevent re-injury. [] Progressing: [] Met: [] Not Met: [] Adjusted  2. Patient will have a decrease in pain to facilitate improvement in movement, function, and ADLs as indicated by Functional Deficits. [] Progressing: [] Met: [] Not Met: [] Adjusted     Long Term Goals: To be achieved in: 6 weeks  1. FOTO Lumbar will score 58 or greater to assist with reaching prior level of function. [] Progressing: [] Met: [] Not Met: [] Adjusted  2. Patient will demonstrate increased AROM to WNL, good LS mobility, good hip ROM to allow for proper joint functioning as indicated by patients Functional Deficits. [] Progressing: [] Met: [] Not Met: [] Adjusted  3. Patient will demonstrate an increase in Strength to good proximal hip and core activation to allow for proper functional mobility as indicated by patients Functional Deficits. 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 [] Progressing: [] Met: [] Not Met: [] Adjusted  5. Patient will be able to sleep at night without waking due to pain 5 nights/week. [] Progressing: [] Met: [] Not Met: [] Adjusted       ASSESSMENT:  See eval  Pain L side only; will need assessment of L iliopsoas    Treatment/Activity Tolerance:  [x] Patient tolerated treatment well [] Patient limited by fatique  [] Patient limited by pain  [] Patient limited by other medical complications  [] Other:     Overall Progression Towards Functional goals/ Treatment Progress Update:  [] Patient is progressing as expected towards functional goals listed. [] Progression is slowed due to complexities/Impairments listed. [] Progression has been slowed due to co-morbidities.   [x] Plan just implemented, too soon to assess goals progression <30days   [] Goals require adjustment due to lack of progress  [] Patient is not progressing as expected and requires additional follow up with physician  [] Other:    Prognosis for POC: [x] Good [] Fair  [] Poor    Patient requires continued skilled intervention: [x] Yes  [] No        PLAN: Assess L iliopsoas. Focus hook lying and prone ab stab, manual L lower lumbar  [] Continue per plan of care [] Alter current plan (see comments)  [x] Plan of care initiated [] Hold pending MD visit [] Discharge    Electronically signed by: Mary Tinajero PT , OMT-C, [id number]      Note: If patient does not return for scheduled/recommended follow up visits, this note will serve as a discharge from care along with the most recent update on progress.

## 2022-12-05 ENCOUNTER — HOSPITAL ENCOUNTER (OUTPATIENT)
Dept: PHYSICAL THERAPY | Age: 67
Setting detail: THERAPIES SERIES
Discharge: HOME | End: 2022-12-05
Payer: MEDICARE

## 2022-12-05 NOTE — FLOWSHEET NOTE
[facility] [address]  [place] [address]  Phone: [phone]   Fax:     [phone]     Physical Therapy  Cancellation/No-show Note  Patient Name:  Darin Suazo  :  1955   Date:  2022  Cancelled visits to date: 1  No-shows to date: 0    Patient status for today's appointment patient:  [x]  Cancelled  []  Rescheduled appointment  []  No-show     Reason given by patient:  [x]  Patient ill  []  Conflicting appointment  []  No transportation    []  Conflict with work  []  No reason given  []  Other:     Comments:      Phone call information:   []  Phone call made today to patient at _ time at number provided:      []  Patient answered, conversation as follows:    []  Patient did not answer, message left as follows:  []  Phone call not made today    Electronically signed by:  Margaret Fierro PTA

## 2022-12-09 ENCOUNTER — HOSPITAL ENCOUNTER (OUTPATIENT)
Dept: PHYSICAL THERAPY | Age: 67
Setting detail: THERAPIES SERIES
Discharge: HOME | End: 2022-12-09
Payer: MEDICARE

## 2022-12-09 PROCEDURE — 97110 THERAPEUTIC EXERCISES: CPT

## 2022-12-09 PROCEDURE — 97112 NEUROMUSCULAR REEDUCATION: CPT

## 2022-12-09 PROCEDURE — 97140 MANUAL THERAPY 1/> REGIONS: CPT

## 2022-12-09 NOTE — FLOWSHEET NOTE
[facility] [address] [place] [address]  Phone: [phone]   Fax:     [phone]    Physical Therapy Treatment Note/ Progress Report:     Date:  2022    Patient Name:  Razia Roy    :  1955  MRN: [id number]    Pertinent Medical History:      Referring Provider:  Michel Bowie PA-C                                  Evaluation Date: 10/17/2022                                               Medical Diagnosis:  Low back pain, unspecified [M54.50]     Treatment Diagnosis:      ICD-10-CM     1. Decreased activities of daily living (ADL)  Z78.9                                      Insurance information: PT Insurance Information: Medicare- start at $1,032    Plan of Care signed: Yes    Date of Patient follow up with Physician:      Progress Report: []  Yes  [x]  No     Date Range for reporting period:  Beginning: 10/17/22  Ending:    Progress report due (10 Rx/or 30 days whichever is less):      Recertification due (POC duration/ or 90 days whichever is less):     Visit # POC/ Insurance Allowable Auth Needed   6 12 []Yes    []No     Pain level:  3-10/10   Functional Scale: FOTO Lumbar = 10/17/22 44; 22 51; 22 57      History of Injury:Patient stated her doctor sent her to PT- \"my hip is totally shot and gave me a shot. \"  Pt stated she was recommended for a total hip replacement but would like to wait through the holidays. Pt was referred to PT for her low back. \"My back is messed up. \" Pt had MRI- \"basically the whole lumbar is messed up, instead of herniated to the side they're front and back. 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 \"  Pt c/o \"this huge knot in my back\" at the L lower lumbar region.     Pain Scale: 3-10/10  Easing factors: rest (\"I have Sundays off\"), ice  Provocative factors: sleeping, walking, squatting, stairs, household management, work     Pt had MRI on 22: 1. There appears to be sacralization of L5.   2. Moderate spinal canal stenosis at L3-L4 and L4-L5.   3. Scattered neural foraminal narrowing as above. 4. Minimal retrolisthesis at L2-L3 with grade 1 anterolisthesis at L4-L5.   5. Question of minimal bone marrow edema associated with L3-L4 facet   arthrosis. SUBJECTIVE:  See eval  10/24/22 pt stated STM helped last session, was able to stand up straight. Pt had 5 days of pain relief until working at [employer] on Saturday. 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 Pt climbed a step ladder a lot today. 11/4/22 back is less stiff  11/11/22 pt stated \"this knot is going away, this is helping tremendously. \"    Pt worked last Saturday night and had new pains, located at the L groin (same hip region that is considered for a ROBERT in the future). Pain went away 2 days later [facility] everything felt much stronger. \"  12/2/22 pt stated her pain is decreasing, feels like hip flexors are tight as she is standing more erect. Pt stated her left leg feels like it is longer than the R leg. Pt has been off from PT for 3 weeks. Pt was able to call and schedule more. Pt stated she is making progress; the knot in her back is decreasing, low back pain is decreasing. 12/9/22 pt stated she had L hip pain following last session for 4-5 days. Pt noted she worked a lot of hours over the weekend, had flu shot and pneumonia shot recently, all of which she felt contributed to pain. Pt stated her lumbar spine is doing excellent, that the knot is decreasing, is able to stand up straight. Pt feels like her back flares up related to hip problems.     OBJECTIVE:   Observation:   10/24/22  Joint mobility: Pain PA L unilateral L3/4 produced at L SIJ              [x]Normal                      []Hypo              []Hyper   Palpation: TTP L3-S1 L unilateral paraspinals/multifidi  Posture: Elevated L innominate   Gait: (include devices/WB status) decreased L step length  12/2/22  Joint mobility: no longer had pain with PA testing lumbar central and unilateral testing              [x]Normal                      []Hypo              []Hyper   Palpation: TTP L5/S1 L unilateral paraspinals/multifidi  Posture: Elevated L innominate continues; Leg length discrepancy in supine  Gait: (include devices/WB status) decreased L step length  Flexibility hip flexors- minimal tightness    Test measurements:    ROM LEFT  10/24/22 RIGHT  10/24/22 11/11/22   Lumbar Side Bend WFL, L hip pain WFL WFL, L hip pain   Duke test  Tight, caused spasm            RESTRICTIONS/PRECAUTIONS: CA, R RC repair    Exercises/Interventions:     Therapeutic Ex (27780)   Min: 15 Repetitions/Resistance Notes/Cues   Nu Step 5'                        Prone ab stab  Hip rotation     Quad/hip flexor stretch  Prone  Standing    1x45\" L/R  1x30\" L/R        LTR stretch 2x30\" L/R    Manual Intervention ([id number]) Min: 15 L5/S1 fused                 STM L piriformis and gmed 15'    NMR re-education (26931)   Min:     Hook lying ab stab  Pelvic tilt  Fall outs  March  Advanced march  T-band hip ER   10x  10x   10x  10x  Green 2x10 Focus ab stab   Bridge with ADD set  Bridge with ABD set 10x  Green band, 10x              Therapeutic Activity (98410) Min:               Modalities  Min:             Other Therapeutic Activities:  Pt was educated on PT POC, Diagnosis, Prognosis, pathomechanics as well as frequency and duration of scheduling future physical therapy appointments. Time was also taken on this day to answer all patient questions and participation in PT. Reviewed appointment policy in detail with patient and patient verbalized understanding. Home Exercise Program:  Access Code: [email or web address]  URL: [employer]/  Date: 10/17/2022  Prepared by: Leobardo Balls     Exercises  Supine Bridge - 1 x daily - 7 x weekly - 10 reps - 5 hold  Supine Lower Trunk Rotation - 1 x daily - 7 x weekly - 1 reps - 60 hold  Supine Single Knee to Chest Stretch - 1 x daily - 7 x weekly - 1 reps - 60 hold  Seated Flexion Stretch - 1 x daily - 7 x weekly - 5 reps - 10 hold  Supine Single Bent Knee Fallout - 1 x daily - 7 x weekly - 10 reps    Therapeutic Exercise and NMR EXR  [] (12633) Provided verbal/tactile cueing for activities related to strengthening, flexibility, endurance, ROM  for improvements in proximal hip and core control with self care, mobility, lifting and ambulation.  [] (42354) Provided verbal/tactile cueing for activities related to improving balance, coordination, kinesthetic sense, posture, motor skill, proprioception  to assist with core control in self care, mobility, lifting, and ambulation.      Therapeutic Activities:    [] (93000 or 83796) Provided verbal/tactile cueing for activities related to improving balance, coordination, kinesthetic sense, posture, motor skill, proprioception and motor activation to allow for proper function  with self care and ADLs  [] (61207) Provided training and instruction to the patient for proper core and proximal hip recruitment and positioning with ambulation re-education     Home Exercise Program:    [] (72756) Reviewed/Progressed HEP activities related to strengthening, flexibility, endurance, ROM of core, proximal hip and LE for functional self-care, mobility, lifting and ambulation   [] (45401) Reviewed/Progressed HEP activities related to improving balance, coordination, kinesthetic sense, posture, motor skill, proprioception of core, proximal hip and LE for self care, mobility, lifting, and ambulation      Manual Treatments:  PROM / STM / Oscillations-Mobs:  G-I, II, III, IV (PA's, Inf., Post.)  [] (19931) Provided manual therapy to mobilize proximal hip and LS spine soft tissue/joints for the purpose of modulating pain, promoting relaxation,  increasing ROM, reducing/eliminating soft tissue swelling/inflammation/restriction, improving soft tissue extensibility and allowing for proper ROM for normal function with self care, mobility, lifting and ambulation. Charges:  Timed Code Treatment Minutes: 45   Total Treatment Minutes: 45     [] EVAL (LOW) 54304 (typically 20 minutes face-to-face)  [] EVAL (MOD) 17781 (typically 30 minutes face-to-face)  [] EVAL (HIGH) 54919 (typically 45 minutes face-to-face)  [] RE-EVAL     [x] FH(25919) x     [] Dry needle 1 or 2 Muscles (92072)  [x] NMR (49352) x     [] Dry needle 3+ Muscles (18336)  [x] Manual (91779) x    [] Ultrasound (66605) x  [] TA (75143) x     [] Mech Traction (31275)  [] ES(attended) (51195)     [] ES (un) (33841):   [] Vasopump (44582) [] Ionto (37236)   [] Other:    GOALS:  Patient stated goal: decrease pain  [] Progressing: [] Met: [] Not Met: [] Adjusted  Therapist goals for Patient:   Short Term Goals: To be achieved in: 2 weeks  1. Independent in HEP and progression per patient tolerance, in order to prevent re-injury. [] Progressing: [] Met: [] Not Met: [] Adjusted  2. Patient will have a decrease in pain to facilitate improvement in movement, function, and ADLs as indicated by Functional Deficits. [] Progressing: [] Met: [] Not Met: [] Adjusted     Long Term Goals: To be achieved in: 6 weeks  1. FOTO Lumbar will score 58 or greater to assist with reaching prior level of function. [] Progressing: [] Met: [] Not Met: [] Adjusted  2. Patient will demonstrate increased AROM to WNL, good LS mobility, good hip ROM to allow for proper joint functioning as indicated by patients Functional Deficits. [] Progressing: [] Met: [] Not Met: [] Adjusted  3. Patient will demonstrate an increase in Strength to good proximal hip and core activation to allow for proper functional mobility as indicated by patients Functional Deficits. 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    [] Progressing: [] Met: [] Not Met: [] Adjusted  5. Patient will be able to sleep at night without waking due to pain 5 nights/week. [] Progressing: [] Met: [] Not Met: [] Adjusted       ASSESSMENT:  See eval  Pain L side only; will need assessment of L iliopsoas    Treatment/Activity Tolerance:  [x] Patient tolerated treatment well [] Patient limited by fatique  [] Patient limited by pain  [] Patient limited by other medical complications  [] Other:     Overall Progression Towards Functional goals/ Treatment Progress Update:  [] Patient is progressing as expected towards functional goals listed. [] Progression is slowed due to complexities/Impairments listed. [] Progression has been slowed due to co-morbidities. [x] Plan just implemented, too soon to assess goals progression <30days   [] Goals require adjustment due to lack of progress  [] Patient is not progressing as expected and requires additional follow up with physician  [] Other:    Prognosis for POC: [x] Good [] Fair  [] Poor    Patient requires continued skilled intervention: [x] Yes  [] No        PLAN: Consider STM L iliopsoas. [x] Continue per plan of care [] Alter current plan (see comments)  [] Plan of care initiated [] Hold pending MD visit [] Discharge    Electronically signed by: Ozzie Mccarty PT , RAIMUNDO-C, [id number]      Note: If patient does not return for scheduled/recommended follow up visits, this note will serve as a discharge from care along with the most recent update on progress.

## 2022-12-12 ENCOUNTER — HOSPITAL ENCOUNTER (OUTPATIENT)
Dept: PHYSICAL THERAPY | Age: 67
Setting detail: THERAPIES SERIES
Discharge: HOME | End: 2022-12-12
Payer: MEDICARE

## 2022-12-12 PROCEDURE — 97110 THERAPEUTIC EXERCISES: CPT

## 2022-12-12 PROCEDURE — 97140 MANUAL THERAPY 1/> REGIONS: CPT

## 2022-12-12 PROCEDURE — 97112 NEUROMUSCULAR REEDUCATION: CPT

## 2022-12-12 NOTE — FLOWSHEET NOTE
[facility] [address]  Phone: [phone]   Fax:     [phone]    Physical Therapy Treatment Note/ Progress Report:     Date:  2022    Patient Name:  Melissa Gonzáles    :  1955  MRN: [id number]    Pertinent Medical History:      Referring Provider:  Wil Telles PA-C                                  Evaluation Date: 10/17/2022                                               Medical Diagnosis:  Low back pain, unspecified [M54.50]     Treatment Diagnosis:      ICD-10-CM     1. Decreased activities of daily living (ADL)  Z78.9                                      Insurance information: PT Insurance Information: Medicare- start at $1,032    Plan of Care signed: Yes    Date of Patient follow up with Physician:      Progress Report: []  Yes  [x]  No     Date Range for reporting period:  Beginning: 10/17/22  Ending:    Progress report due (10 Rx/or 30 days whichever is less):      Recertification due (POC duration/ or 90 days whichever is less):     Visit # POC/ Insurance Allowable Auth Needed   7 12 []Yes    []No     Pain level:  3-10/10   Functional Scale: FOTO Lumbar = 10/17/22 44; 22 51; 22 57      History of Injury:Patient stated her doctor sent her to PT- \"my hip is totally shot and gave me a shot. \"  Pt stated she was recommended for a total hip replacement but would like to wait through the holidays. Pt was referred to PT for her low back. \"My back is messed up. \" Pt had MRI- \"basically the whole lumbar is messed up, instead of herniated to the side they're front and back. 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 \"  Pt c/o \"this huge knot in my back\" at the L lower lumbar region.     Pain Scale: 3-10/10  Easing factors: rest (\"I have Sundays off\"), ice  Provocative factors: sleeping, walking, squatting, stairs, household management, work     Pt had MRI on 22: 1. There appears to be sacralization of L5.   2. Moderate spinal canal stenosis at L3-L4 and L4-L5.   3. Scattered neural foraminal narrowing as above. 4. Minimal retrolisthesis at L2-L3 with grade 1 anterolisthesis at L4-L5.   5. Question of minimal bone marrow edema associated with L3-L4 facet   arthrosis. SUBJECTIVE:  See eval  10/24/22 pt stated STM helped last session, was able to stand up straight. Pt had 5 days of pain relief until working at [employer] on Saturday. 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 Pt climbed a step ladder a lot today. 11/4/22 back is less stiff  11/11/22 pt stated \"this knot is going away, this is helping tremendously. \"    Pt worked last Saturday night and had new pains, located at the L groin (same hip region that is considered for a ROBERT in the future). Pain went away 2 days later [facility] everything felt much stronger. \"  12/2/22 pt stated her pain is decreasing, feels like hip flexors are tight as she is standing more erect. Pt stated her left leg feels like it is longer than the R leg. Pt has been off from PT for 3 weeks. Pt was able to call and schedule more. Pt stated she is making progress; the knot in her back is decreasing, low back pain is decreasing. 12/9/22 pt stated she had L hip pain following last session for 4-5 days. Pt noted she worked a lot of hours over the weekend, had flu shot and pneumonia shot recently, all of which she felt contributed to pain. Pt stated her lumbar spine is doing excellent, that the knot is decreasing, is able to stand up straight. Pt feels like her back flares up related to hip problems. 12/12/22 pt had a lot of soreness following last session and had pain following work shift Saturday night. Pain was located at the the L SIJ.     OBJECTIVE:   Observation:   10/24/22  Joint mobility: Pain PA L unilateral L3/4 produced at L SIJ              [x]Normal                      []Hypo              []Hyper   Palpation: TTP L3-S1 L unilateral paraspinals/multifidi  Posture: Elevated L innominate   Gait: (include devices/WB status) decreased L step length  12/2/22  Joint mobility: no longer had pain with PA testing lumbar central and unilateral testing              [x]Normal                      []Hypo              []Hyper   Palpation: TTP L5/S1 L unilateral paraspinals/multifidi  Posture: Elevated L innominate continues; Leg length discrepancy in supine  Gait: (include devices/WB status) decreased L step length  Flexibility hip flexors- minimal tightness    Test measurements:    ROM LEFT  10/24/22 RIGHT  10/24/22 11/11/22   Lumbar Side Bend WFL, L hip pain WFL WFL, L hip pain   [facility] test  Tight, caused spasm            RESTRICTIONS/PRECAUTIONS: CA, R RC repair    Exercises/Interventions:     Therapeutic Ex (80046)   Min: 15 Repetitions/Resistance Notes/Cues   Nu Step 5'                        Prone ab stab  Hip rotation     Quad/hip flexor stretch  Prone  Standing    1x45\" L/R  1x30\" L/R        LTR stretch 2x30\" L/R    Manual Intervention ([id number]) Min: 15 L5/S1 fused                 STM L piriformis and gmed 15'    NMR re-education (69367)   Min:     Hook lying ab stab  Pelvic tilt  Fall outs  March  Advanced march  T-band hip ER   10x  10x   10x  10x  Green 2x10 Focus ab stab   Bridge with ADD set  Bridge with ABD set 10x  Green band, 10x              Therapeutic Activity (65852) Min:               Modalities  Min:             Other Therapeutic Activities:  Pt was educated on PT POC, Diagnosis, Prognosis, pathomechanics as well as frequency and duration of scheduling future physical therapy appointments. Time was also taken on this day to answer all patient questions and participation in PT. Reviewed appointment policy in detail with patient and patient verbalized understanding. Home Exercise Program:  Access Code: [email or web address]  URL: [email or web address]  Date: 12/12/2022- revised  Prepared by: Oliver Sauceda Walro    Exercises  Supine Bridge - 1 x daily - 7 x weekly - 10 reps - 5 hold  Supine Lower Trunk Rotation - 1 x daily - 7 x weekly - 1 reps - 60 hold  Supine Single Knee to Chest Stretch - 1 x daily - 7 x weekly - 1 reps - 60 hold  Seated Flexion Stretch - 1 x daily - 7 x weekly - 5 reps - 10 hold  Supine Single Bent Knee Fallout - 1 x daily - 7 x weekly - 10 reps  Supine Bridge with Mini Swiss Ball Between Knees - 1 x daily - 7 x weekly - 10 reps - 5 hold  Supine Bridge with Resistance Band - 1 x daily - 7 x weekly - 10 reps - 5 hold  Hooklying Single Leg Bent Knee Fallouts with Resistance - 1 x daily - 7 x weekly - 10 reps  Hooklying Single Leg March - 1 x daily - 7 x weekly - 10 reps  Hooklying March with Leg Extension - 1 x daily - 7 x weekly - 10 reps      Therapeutic Exercise and NMR EXR  [] (12470) Provided verbal/tactile cueing for activities related to strengthening, flexibility, endurance, ROM  for improvements in proximal hip and core control with self care, mobility, lifting and ambulation.  [] (01053) Provided verbal/tactile cueing for activities related to improving balance, coordination, kinesthetic sense, posture, motor skill, proprioception  to assist with core control in self care, mobility, lifting, and ambulation.      Therapeutic Activities:    [] (15543 or 94531) Provided verbal/tactile cueing for activities related to improving balance, coordination, kinesthetic sense, posture, motor skill, proprioception and motor activation to allow for proper function  with self care and ADLs  [] (05880) Provided training and instruction to the patient for proper core and proximal hip recruitment and positioning with ambulation re-education     Home Exercise Program:    [] (19369) Reviewed/Progressed HEP activities related to strengthening, flexibility, endurance, ROM of core, proximal hip and LE for functional self-care, mobility, lifting and ambulation   [] (39654) Reviewed/Progressed HEP activities related to improving balance, coordination, kinesthetic sense, posture, motor skill, proprioception of core, proximal hip and LE for self care, mobility, lifting, and ambulation      Manual Treatments:  PROM / STM / Oscillations-Mobs:  G-I, II, III, IV (PA's, Inf., Post.)  [] (08474) Provided manual therapy to mobilize proximal hip and LS spine soft tissue/joints for the purpose of modulating pain, promoting relaxation,  increasing ROM, reducing/eliminating soft tissue swelling/inflammation/restriction, improving soft tissue extensibility and allowing for proper ROM for normal function with self care, mobility, lifting and ambulation. Charges:  Timed Code Treatment Minutes: 45   Total Treatment Minutes: 45     [] EVAL (LOW) 84676 (typically 20 minutes face-to-face)  [] EVAL (MOD) 82643 (typically 30 minutes face-to-face)  [] EVAL (HIGH) 06415 (typically 45 minutes face-to-face)  [] RE-EVAL     [x] OZ(73451) x     [] Dry needle 1 or 2 Muscles (26187)  [x] NMR (70524) x     [] Dry needle 3+ Muscles (20388)  [x] Manual (93770) x    [] Ultrasound (22443) x  [] TA (64434) x     [] Mech Traction (35126)  [] ES(attended) (53487)     [] ES (un) (40620):   [] Vasopump (55273) [] Ionto (42574)   [] Other:    GOALS:  Patient stated goal: decrease pain  [] Progressing: [] Met: [] Not Met: [] Adjusted  Therapist goals for Patient:   Short Term Goals: To be achieved in: 2 weeks  1. Independent in HEP and progression per patient tolerance, in order to prevent re-injury. [] Progressing: [] Met: [] Not Met: [] Adjusted  2. Patient will have a decrease in pain to facilitate improvement in movement, function, and ADLs as indicated by Functional Deficits. [] Progressing: [] Met: [] Not Met: [] Adjusted     Long Term Goals: To be achieved in: 6 weeks  1. FOTO Lumbar will score 58 or greater to assist with reaching prior level of function. [] Progressing: [] Met: [] Not Met: [] Adjusted  2.  Patient will demonstrate increased AROM to WNL, good LS mobility, good hip ROM to allow for proper joint functioning as indicated by patients Functional Deficits. [] Progressing: [] Met: [] Not Met: [] Adjusted  3. Patient will demonstrate an increase in Strength to good proximal hip and core activation to allow for proper functional mobility as indicated by patients Functional Deficits. 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 [] Progressing: [] Met: [] Not Met: [] Adjusted  5. Patient will be able to sleep at night without waking due to pain 5 nights/week. [] Progressing: [] Met: [] Not Met: [] Adjusted       ASSESSMENT:  See eval  Pain L side only; will need assessment of L iliopsoas    Treatment/Activity Tolerance:  [x] Patient tolerated treatment well [] Patient limited by fatique  [] Patient limited by pain  [] Patient limited by other medical complications  [] Other:     Overall Progression Towards Functional goals/ Treatment Progress Update:  [] Patient is progressing as expected towards functional goals listed. [] Progression is slowed due to complexities/Impairments listed. [] Progression has been slowed due to co-morbidities. [x] Plan just implemented, too soon to assess goals progression <30days   [] Goals require adjustment due to lack of progress  [] Patient is not progressing as expected and requires additional follow up with physician  [] Other:    Prognosis for POC: [x] Good [] Fair  [] Poor    Patient requires continued skilled intervention: [x] Yes  [] No        PLAN: Consider STM L iliopsoas. Begin prone hip rotation/isometric exercises; consider leg press.   [x] Continue per plan of care [] Alter current plan (see comments)  [] Plan of care initiated [] Hold pending MD visit [] Discharge    Electronically signed by: Mark Ley, PT , OMT-C, [id number]      Note: If patient does not return for scheduled/recommended follow up visits, this note will serve as a discharge from care along with the most recent update on progress.

## 2022-12-16 ENCOUNTER — HOSPITAL ENCOUNTER (OUTPATIENT)
Dept: PHYSICAL THERAPY | Age: 67
Setting detail: THERAPIES SERIES
Discharge: HOME | End: 2022-12-16
Payer: MEDICARE

## 2022-12-16 NOTE — PROGRESS NOTES
[facility]  [facility] [address] [place] [address]  Phone: [phone]   Fax:     [phone]     Physical Therapy  Cancellation/No-show Note  Patient Name:  Joey Wylie  :  1955   Date:  2022  Cancelled visits to date: 1  No-shows to date: 0    Patient status for today's appointment patient:  [x]  Cancelled  []  Rescheduled appointment  []  No-show     Reason given by patient:  []  Patient ill  []  Conflicting appointment  []  No transportation    []  Conflict with work  []  No reason given  [x]  Other:     Comments:  \"something came up\"    Phone call information:   []  Phone call made today to patient at _ time at number provided:      []  Patient answered, conversation as follows:    []  Patient did not answer, message left as follows:  []  Phone call not made today    Electronically signed by:  Jersey Pederson PT

## 2022-12-19 ENCOUNTER — HOSPITAL ENCOUNTER (OUTPATIENT)
Dept: PHYSICAL THERAPY | Age: 67
Setting detail: THERAPIES SERIES
Discharge: HOME | End: 2022-12-19
Payer: MEDICARE

## 2022-12-23 ENCOUNTER — HOSPITAL ENCOUNTER (OUTPATIENT)
Dept: PHYSICAL THERAPY | Age: 67
Setting detail: THERAPIES SERIES
End: 2022-12-23
Payer: MEDICARE

## 2022-12-30 ENCOUNTER — HOSPITAL ENCOUNTER (OUTPATIENT)
Dept: PHYSICAL THERAPY | Age: 67
Setting detail: THERAPIES SERIES
End: 2022-12-30
Payer: MEDICARE

## 2022-12-30 NOTE — PROGRESS NOTES
[facility] [place] [address] [place] [address]  Phone: [phone]   Fax:     [phone]     Physical Therapy  Cancellation/No-show Note  Patient Name:  Altaf Lyon  :  1955   Date:  2022  Cancelled visits to date: 2  No-shows to date: 1    Patient status for today's appointment patient:  []  Cancelled  []  Rescheduled appointment  [x]  No-show     Reason given by patient:  []  Patient ill  []  Conflicting appointment  []  No transportation    []  Conflict with work  []  No reason given  []  Other:     Comments:      Phone call information:   []  Phone call made today to patient at _ time at number provided:      []  Patient answered, conversation as follows:    []  Patient did not answer, message left as follows: PT was unable to leave message as voice mail was not set up to take answers.   []  Phone call not made today    Electronically signed by:  Raúl Liz PT

## 2023-01-16 ENCOUNTER — TELEPHONE (OUTPATIENT)
Dept: ORTHOPEDIC SURGERY | Age: 68
End: 2023-01-16

## 2023-01-23 ENCOUNTER — OFFICE VISIT (OUTPATIENT)
Dept: ORTHOPEDIC SURGERY | Age: 68
End: 2023-01-23
Payer: MEDICARE

## 2023-01-23 VITALS — HEIGHT: 63 IN | BODY MASS INDEX: 29.95 KG/M2 | WEIGHT: 169 LBS | RESPIRATION RATE: 16 BRPM

## 2023-01-23 DIAGNOSIS — M16.12 PRIMARY OSTEOARTHRITIS OF LEFT HIP: Primary | ICD-10-CM

## 2023-01-23 PROCEDURE — G8417 CALC BMI ABV UP PARAM F/U: HCPCS | Performed by: ORTHOPAEDIC SURGERY

## 2023-01-23 PROCEDURE — 1090F PRES/ABSN URINE INCON ASSESS: CPT | Performed by: ORTHOPAEDIC SURGERY

## 2023-01-23 PROCEDURE — G8399 PT W/DXA RESULTS DOCUMENT: HCPCS | Performed by: ORTHOPAEDIC SURGERY

## 2023-01-23 PROCEDURE — 4004F PT TOBACCO SCREEN RCVD TLK: CPT | Performed by: ORTHOPAEDIC SURGERY

## 2023-01-23 PROCEDURE — 1123F ACP DISCUSS/DSCN MKR DOCD: CPT | Performed by: ORTHOPAEDIC SURGERY

## 2023-01-23 PROCEDURE — G8484 FLU IMMUNIZE NO ADMIN: HCPCS | Performed by: ORTHOPAEDIC SURGERY

## 2023-01-23 PROCEDURE — 99213 OFFICE O/P EST LOW 20 MIN: CPT | Performed by: ORTHOPAEDIC SURGERY

## 2023-01-23 PROCEDURE — 3017F COLORECTAL CA SCREEN DOC REV: CPT | Performed by: ORTHOPAEDIC SURGERY

## 2023-01-23 PROCEDURE — G8427 DOCREV CUR MEDS BY ELIG CLIN: HCPCS | Performed by: ORTHOPAEDIC SURGERY

## 2023-01-25 NOTE — PROGRESS NOTES
[facility]  Office Visit    Chief Complaint: Left hip pain    HPI:  Ayesha Johnston is a 67 y.o. who is here in follow-up of left hip pain.  She underwent intra-articular left hip steroid injection in September 1, 2022 which significantly relieved her symptoms.  However, her symptoms have gradually returned and she now reports groin pain and walks on a daily basis.  There is no history of injury or surgery.  She is also seeing [place] for her spine.  She has been in physical therapy which is helped her spine and her hip.  She also occasionally takes Percocet for pain.  She is interested in a repeat left hip steroid injection possible left total hip arthroplasty later this year.      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    CLL (chronic lymphocytic leukemia) ([facility])       ROS:  Constitutional: denies fever, chills, weight loss  MSK: denies pain in other joints, muscle aches  Neurological: denies numbness, tingling, weakness    Exam:  Resp. rate 16, height 5' 3\" (1.6 m), weight 169 lb (76.7 kg)    Appearance: sitting in exam room chair, appears to be in no acute distress, awake and alert  Resp: unlabored breathing on room air  Skin: warm, dry and intact with out erythema or significant increased temperature  Neuro: grossly intact both lower extremities. Intact sensation to light touch. Motor exam 4+ to 5/5 in all major motor groups.  LLE: Examination demonstrates pain with logroll and Stinchfield.  There is brisk capillary refill.  Strength is 5/5 in hamstrings, quads, hip flexors.     Imaging:  Prior left hip radiographs reviewed today.  She has bone-on-bone arthritis of the left hip joint with minimal remaining joint space.  There are periarticular osteophytes.    Assessment:  Left hip  osteoarthritis    Plan:  We discussed the diagnosis and treatment options.  She had sustained relief of symptoms with the left hip steroid injection nearly 5 months ago.  She is interested in repeat injection and total hip arthroplasty later in the year.  She will be seen in the near future for a left hip steroid injection follow-up later for further surgical discussion.    Total time spent on today's encounter was at least 23 minutes. This time included reviewing prior notes, radiographs, and lab results when available, reviewing history obtained by medical assistant, performing history and physical exam, reviewing tests/radiographs with the patient, counseling the patient, ordering medications or tests, documentation in the electronic health record, and coordination of care.    This dictation was done with [employer] dictation and may contain mechanical errors related to translation.

## 2023-01-26 ENCOUNTER — HOSPITAL ENCOUNTER (OUTPATIENT)
Dept: GENERAL RADIOLOGY | Age: 68
Discharge: HOME | End: 2023-01-26
Payer: MEDICARE

## 2023-01-26 ENCOUNTER — OFFICE VISIT (OUTPATIENT)
Dept: ORTHOPEDIC SURGERY | Age: 68
End: 2023-01-26

## 2023-01-26 DIAGNOSIS — M16.12 PRIMARY OSTEOARTHRITIS OF LEFT HIP: ICD-10-CM

## 2023-01-26 DIAGNOSIS — M16.12 PRIMARY OSTEOARTHRITIS OF LEFT HIP: Primary | ICD-10-CM

## 2023-01-26 PROCEDURE — 77002 NEEDLE LOCALIZATION BY XRAY: CPT

## 2023-01-26 PROCEDURE — 20610 DRAIN/INJ JOINT/BURSA W/O US: CPT

## 2023-01-26 PROCEDURE — 6360000002 HC RX W HCPCS

## 2023-01-26 PROCEDURE — 2500000003 HC RX 250 WO HCPCS

## 2023-05-08 PROBLEM — F11.20 OPIOID DEPENDENCE WITH CURRENT USE (HCC): Status: ACTIVE | Noted: 2023-05-08

## 2023-06-02 ENCOUNTER — OFFICE VISIT (OUTPATIENT)
Dept: ORTHOPEDIC SURGERY | Age: 68
End: 2023-06-02

## 2023-06-02 VITALS — BODY MASS INDEX: 27.82 KG/M2 | HEIGHT: 63 IN | WEIGHT: 157 LBS

## 2023-06-02 DIAGNOSIS — M16.12 PRIMARY OSTEOARTHRITIS OF LEFT HIP: Primary | ICD-10-CM

## 2023-06-02 NOTE — PROGRESS NOTES
Fatigue (Stem Cell/Bone Marrow Transplant)  Energy Level Supports Daily Activity  3/29/2019 2155 - No Change by Elda Shetty RN  This rn took over care for 4 hrs. 190-2330  S-pt slept most of the bryce. Denies nausea or pain . Declined food items--sipping on soda. Able to tolerate oral [id number]. Lungs clear. Iv flush at 150 hrly until 6 hrs post tx-complete approx 2300. Bp normotensive- urine tiffanie  B-pt post tx-   A-pt stable post tx  R-hydration until 2300 then tko in case  pt will need medication for nausea later.       Intact sensation to light touch. Motor exam 4+ to 5/5 in all major motor groups. LLE: Examination demonstrates pain with logroll and Stinchfield. There is brisk capillary refill. Strength is 5/5 in hamstrings, quads, hip flexors. Imaging:  AP pelvis, AP and frog-leg lateral radiographs were performed and interpreted today. She has bone-on-bone arthritis of the left hip joint with minimal remaining joint space. There are periarticular osteophytes. Prior lumbar spine radiographs demonstrate L5/S1 anterolisthesis. Assessment:  Left hip osteoarthritis  Lumbar spine degenerative disc disease    Plan:  We discussed the diagnosis and treatment options. We further discussed left total hip arthroplasty. The operative procedure, alternatives, and risks were discussed in detail with the patient. The risks include but are not limited to: Infection, vessel injury, nerve injury, DVT, pulmonary embolism, implant loosening, need for revision surgery, leg length discrepancy, dislocation, lateral femoral cutaneous nerve palsy, intraoperative fracture. Despite these risks the patient would like to proceed. All questions have been answered and no guarantees were made. I discussed with the patient the diagnosis in detail and answered all questions. The patient verbalized understanding of the plan as it has been described above and is in agreement. Plan for anterior left hip arthroplasty. We did discuss the left hip replacement may not improve all her pain as some of it is likely coming from her lumbar spine issues. Total time spent on today's encounter was at least 23 minutes.  This time included reviewing prior notes, radiographs, and lab results when available, reviewing history obtained by medical assistant, performing history and physical exam, reviewing tests/radiographs with the patient, counseling the patient, ordering medications or tests, documentation in the electronic health record, and

## 2023-06-07 ENCOUNTER — TELEPHONE (OUTPATIENT)
Dept: ORTHOPEDIC SURGERY | Age: 68
End: 2023-06-07

## 2023-06-12 ENCOUNTER — TELEPHONE (OUTPATIENT)
Dept: ORTHOPEDIC SURGERY | Age: 68
End: 2023-06-12

## 2023-06-26 ENCOUNTER — TELEPHONE (OUTPATIENT)
Dept: ORTHOPEDIC SURGERY | Age: 68
End: 2023-06-26

## 2023-06-30 ENCOUNTER — TELEPHONE (OUTPATIENT)
Dept: ORTHOPEDIC SURGERY | Age: 68
End: 2023-06-30

## 2023-07-24 ENCOUNTER — HOSPITAL ENCOUNTER (OUTPATIENT)
Dept: PREADMISSION TESTING | Age: 68
Discharge: HOME | End: 2023-07-28
Payer: MEDICARE

## 2023-07-24 DIAGNOSIS — E78.00 HIGH CHOLESTEROL: ICD-10-CM

## 2023-07-24 DIAGNOSIS — M16.12 PRIMARY OSTEOARTHRITIS OF LEFT HIP: ICD-10-CM

## 2023-07-24 LAB
25(OH)D3 SERPL-MCNC: 64.7 NG/ML
ABO + RH BLD: NORMAL
ANION GAP SERPL CALCULATED.3IONS-SCNC: 15 MMOL/L (ref 3–16)
APTT BLD: 28.7 SEC (ref 22.7–35.9)
BACTERIA URNS QL MICRO: ABNORMAL /HPF
BILIRUB UR QL STRIP.AUTO: NEGATIVE
BLD GP AB SCN SERPL QL: NORMAL
BUN SERPL-MCNC: 15 MG/DL (ref 7–20)
CALCIUM OXALATE CRYSTALS: PRESENT
CALCIUM SERPL-MCNC: 10.2 MG/DL (ref 8.3–10.6)
CHLORIDE SERPL-SCNC: 104 MMOL/L (ref 99–110)
CLARITY UR: ABNORMAL
CO2 SERPL-SCNC: 20 MMOL/L (ref 21–32)
COLOR UR: YELLOW
CREAT SERPL-MCNC: 0.8 MG/DL (ref 0.6–1.2)
EPI CELLS #/AREA URNS AUTO: 4 /HPF (ref 0–5)
EST. AVERAGE GLUCOSE BLD GHB EST-MCNC: 125.5 MG/DL
GFR SERPLBLD CREATININE-BSD FMLA CKD-EPI: >60 ML/MIN/{1.73_M2}
GLUCOSE SERPL-MCNC: 141 MG/DL (ref 70–99)
GLUCOSE UR STRIP.AUTO-MCNC: NEGATIVE MG/DL
HBA1C MFR BLD: 6 %
HGB UR QL STRIP.AUTO: NEGATIVE
HYALINE CASTS #/AREA URNS AUTO: 0 /LPF (ref 0–8)
INR PPP: 0.98 (ref 0.84–1.16)
KETONES UR STRIP.AUTO-MCNC: NEGATIVE MG/DL
LEUKOCYTE ESTERASE UR QL STRIP.AUTO: NEGATIVE
NITRITE UR QL STRIP.AUTO: NEGATIVE
PH UR STRIP.AUTO: 5.5 [PH] (ref 5–8)
POTASSIUM SERPL-SCNC: 4.3 MMOL/L (ref 3.5–5.1)
PREALB SERPL-MCNC: 29.8 MG/DL (ref 20–40)
PROT UR STRIP.AUTO-MCNC: NEGATIVE MG/DL
PROTHROMBIN TIME: 13 SEC (ref 11.5–14.8)
RBC CLUMPS #/AREA URNS AUTO: 5 /HPF (ref 0–4)
SODIUM SERPL-SCNC: 139 MMOL/L (ref 136–145)
SP GR UR STRIP.AUTO: 1.02 (ref 1–1.03)
UA COMPLETE W REFLEX CULTURE PNL UR: ABNORMAL
UA DIPSTICK W REFLEX MICRO PNL UR: YES
URN SPEC COLLECT METH UR: ABNORMAL
UROBILINOGEN UR STRIP-ACNC: 0.2 E.U./DL
WBC #/AREA URNS AUTO: 4 /HPF (ref 0–5)

## 2023-07-24 PROCEDURE — 85730 THROMBOPLASTIN TIME PARTIAL: CPT

## 2023-07-24 PROCEDURE — 81001 URINALYSIS AUTO W/SCOPE: CPT

## 2023-07-24 PROCEDURE — 82306 VITAMIN D 25 HYDROXY: CPT

## 2023-07-24 PROCEDURE — 86901 BLOOD TYPING SEROLOGIC RH(D): CPT

## 2023-07-24 PROCEDURE — 83036 HEMOGLOBIN GLYCOSYLATED A1C: CPT

## 2023-07-24 PROCEDURE — 86900 BLOOD TYPING SEROLOGIC ABO: CPT

## 2023-07-24 PROCEDURE — 84134 ASSAY OF PREALBUMIN: CPT

## 2023-07-24 PROCEDURE — 87641 MR-STAPH DNA AMP PROBE: CPT

## 2023-07-24 PROCEDURE — 82040 ASSAY OF SERUM ALBUMIN: CPT

## 2023-07-24 PROCEDURE — 93005 ELECTROCARDIOGRAM TRACING: CPT

## 2023-07-24 PROCEDURE — 85025 COMPLETE CBC W/AUTO DIFF WBC: CPT

## 2023-07-24 PROCEDURE — 80048 BASIC METABOLIC PNL TOTAL CA: CPT

## 2023-07-24 PROCEDURE — 86850 RBC ANTIBODY SCREEN: CPT

## 2023-07-24 PROCEDURE — 85610 PROTHROMBIN TIME: CPT

## 2023-07-24 NOTE — PROGRESS NOTES
Patient attended JET class on 7/24/2023 . Patient verified surgery for Total Hip replacement. Patient  received patient information and educational JET folder including the following handouts: jet powerpoint, covid-19 restrictions, ERAS, incentive spirometry including purpose and how to perform, case management contact information, hand hygiene, preventing constipation, home health care agency list, skilled nursing facility list, pre-operative showering techniques and the use of anti-septic 3 days before surgery. Interviews completed by PT, OT, and Nurse Navigator. Labs and Tests completed as ordered/necessary. Anti-septic bottle given to patient to take home. Patient  states no further questions or concerns. Patient provided orthopedic office, case management, and nurse navigator contact information. Date Of Surgery: 8/2/2023  Surgeon: Dr Deandre Youssef  Per Patient Will see/Saw Primary care provider on 7/5/2023. Will see/Saw Specialist Hematology/Oncology on  7/17/2023 . HISTORY OF JOINT REPLACEMENT(S): No history of joint replacement    DC Plan: Skilled nursing facility    HOME ASSISTANCE - WHO WILL BE STAYING WITH YOU AT HOME FOR FIRST SEVERAL DAYS? No one    DC TRANSPORTATION:  states she will try to secure a ride with friend/family memeber    STEPS INTO HOME: 4    STEPS TO BATHROOM/BEDROOM: one level, Able to live on the main level. DME NEEDS:  Needs a rolling walker, agreeable to using [employer]. LENGTH OF STAY HAS BEEN DISCUSSED WITH THE PATIENT, APPROPRIATE TO HIS/ HER PROCEDURE. PATIENT HAS BEEN INFORMED THAT THEY WILL BE DISCHARGED WHEN THE PHYSICIAN DEEMS THEM MEDICALLY READY. MOST PATIENTS CAN EXPECT TO BE IN THE HOSPITAL ONE NIGHT AS AN OBSERVATION ONLY, OR 1-2 DAYS AS AN ADMISSION FOR THOSE WITH MEDICAL HEALTH ISSUES/COMPLICATIONS. CHOICES FOR HHC, DME VENDORS AND SKILLED/ REHAB FACILITIES PROVIDED TO PATIENT DURING THIS INTERVIEW.     HOME CARE CHOICE(S): not applicable, prefers

## 2023-07-25 LAB
ALBUMIN SERPL-MCNC: 4.6 G/DL (ref 3.4–5)
BASOPHILS # BLD: 0 K/UL (ref 0–0.2)
BASOPHILS NFR BLD: 0 %
DEPRECATED RDW RBC AUTO: 13.1 % (ref 12.4–15.4)
EKG ATRIAL RATE: 87 BPM
EKG DIAGNOSIS: NORMAL
EKG P AXIS: 42 DEGREES
EKG P-R INTERVAL: 128 MS
EKG Q-T INTERVAL: 380 MS
EKG QRS DURATION: 98 MS
EKG QTC CALCULATION (BAZETT): 457 MS
EKG R AXIS: 10 DEGREES
EKG T AXIS: 24 DEGREES
EKG VENTRICULAR RATE: 87 BPM
EOSINOPHIL # BLD: 0 K/UL (ref 0–0.6)
EOSINOPHIL NFR BLD: 0 %
HCT VFR BLD AUTO: 38.4 % (ref 36–48)
HGB BLD-MCNC: 13 G/DL (ref 12–16)
LYMPHOCYTES # BLD: 30.5 K/UL (ref 1–5.1)
LYMPHOCYTES NFR BLD: 82 %
MCH RBC QN AUTO: 32.9 PG (ref 26–34)
MCHC RBC AUTO-ENTMCNC: 33.8 G/DL (ref 31–36)
MCV RBC AUTO: 97.6 FL (ref 80–100)
MONOCYTES # BLD: 0.4 K/UL (ref 0–1.3)
MONOCYTES NFR BLD: 1 %
MRSA DNA SPEC QL NAA+PROBE: NORMAL
NEUTROPHILS # BLD: 4.2 K/UL (ref 1.7–7.7)
NEUTROPHILS NFR BLD: 12 %
PATH INTERP BLD-IMP: NO
PLATELET # BLD AUTO: 273 K/UL (ref 135–450)
PLATELET BLD QL SMEAR: ADEQUATE
PMV BLD AUTO: 8.4 FL (ref 5–10.5)
RBC # BLD AUTO: 3.93 M/UL (ref 4–5.2)
SLIDE REVIEW: ABNORMAL
SMUDGE CELLS BLD QL SMEAR: PRESENT
VARIANT LYMPHS NFR BLD MANUAL: 5 % (ref 0–6)
WBC # BLD AUTO: 35.1 K/UL (ref 4–11)

## 2023-07-25 PROCEDURE — 93010 ELECTROCARDIOGRAM REPORT: CPT | Performed by: INTERNAL MEDICINE

## 2023-07-25 NOTE — PROGRESS NOTES
Follow Up Prior to Surgery    DOS: 23  :1955        Other Clearances:  Pt states she saw [facility] for clear. I asked Reynaldo Dale to look into this and retrieve office notes clearing pt for surgery    Update: clear in epic under media.

## 2023-07-25 NOTE — PROGRESS NOTES
[facility] Pre-Admission Testing Electronic Communication Worksheet for OR/ENDO Procedures        Patient: Marina Morales    DOS: 8/1/23    [facility]    Surgery Time:    Meds to Bed:  [x] YES    []  NO    Transportation Confirmed: [x] YES    []  NO    History and Physical:  [x] YES    []  NO  [] N/A  If yes, please list doctor or Urgent Care and date of H&P:     Additional Clearance(Cardiac, Pulmonary, etc):  [] YES    [x]  NO- Kecia Daugherty to check with [facility]    Pre-Admission Testing Visit:  [x] YES    []  NO If no, do labs/testing need to be done DOS?   [] YES    []  NO    Medication Reconciliation Complete:  [x] YES    []  NO        Additional Notes:                Interview Complete: [x] YES    []  NO          Tatiana Patterson RN  9:35 AM

## 2023-07-25 NOTE — PROGRESS NOTES
Notification sent to Dr Thien Barragan and medical assistant [place] regarding abnormal preoperative labs and pertinent medical history. Oncology clearance on 7/17/2023 noted in care everywhere.

## 2023-07-25 NOTE — PROGRESS NOTES
[facility] Pre-Admission Testing Electronic Communication Worksheet for OR/ENDO Procedures        Patient: Sam Liriano    DOS: 8/1/23    Arrival Time:  per Maurice Jones    Surgery Time:    Meds to Bed:  [x] YES    []  NO    Transportation Confirmed: [x] YES    []  NO    History and Physical:  [x] YES    []  NO  [] N/A  If yes, please list doctor or Urgent Care and date of H&P:     Additional Clearance(Cardiac, Pulmonary, etc):  [x] YES    []  NO    Pre-Admission Testing Visit:  [x] YES  - PER JET CLASS  []  NO If no, do labs/testing need to be done DOS?   [] YES    []  NO    Medication Reconciliation Complete:  [x] YES    []  NO        Additional Notes:                Interview Complete: [x] YES    []  NO          Rosalind Lovett RN  8:45 AM

## 2023-07-25 NOTE — PROGRESS NOTES
plan to stay at the hospital until the child is discharged. Please do not bring other children with you. For your comfort, please wear simple loose fitting clothing to the hospital.  Please do not bring valuables. Do not wear any make-up or nail polish on your fingers or toes. For your safety, please do not wear any jewelry or body piercing's on the day of surgery. All jewelry must be removed. If you have dentures, they will be removed before going to operating room. For your convenience, we will provide you with a container. If you wear contact lenses or glasses, they will be removed, please bring a case for them. If you have a living will and a durable power of  for healthcare, please bring in a copy. As part of our patient safety program to minimize surgical site infections, we ask you to do the following:    Please notify your surgeon if you develop any illness between         now and the day of your surgery. This includes a cough, cold, fever, sore throat, nausea,         or vomiting, and diarrhea, etc.   Please notify your surgeon if you experience dizziness, shortness         of breath or blurred vision between now and the time of your surgery. Do not shave your operative site 96 hours prior to surgery. For face and neck surgery, men may use an electric razor 48 hours   prior to surgery. You may shower the night before surgery or the morning of   your surgery with an antibacterial soap. You will need to bring a photo ID and insurance card     If you use a C-pap or Bi-pap machine, please bring your machine with you to the hospital     Our goal is to provide you with excellent care, therefore, visitors will be limited to so that we may focus on providing this care for you. Please contact your surgeon office, if you have any further questions.                  [facility] phone number:  [address] [facility] fax number:  [phone]    Please note these

## 2023-07-25 NOTE — PROGRESS NOTES
Albumin not completed as ordered. Notified Tin[place]u Square, she states this lab can be added on to lab work completed on 7/24/2023. Notification sent to Meenu Bansal at ortho office.

## 2023-07-25 NOTE — DISCHARGE INSTRUCTIONS
Wear nereyda hoses (compression stockings) for 2 weeks and to only remove when showering or at bedtime. Please wear Teds to follow up appointment with orthopedic surgeon. If you use a bi-pap/cpap for sleep apnea, please wear when sleeping while taking narcotics. Use your Incentive Spirometer 4 times a day (10 breaths) for 1 week after surgery to prevent pneumonia. Use ice packs as needed for swelling and pain. DO NOT apply ice directly to your skin. Use a clean towel or pillow case as a barrier between your skin and the ice pack. When to clean your hands: For patients  In the hospital or in your home, you can come in contact with many harmful germs. To help prevent infection, wash your hands with soap and water often, especially:  Before and After touching or changing a dressing or bandage  After using the bathroom  Before and after eating  After coughing or sneezing  After using a tissue  After touching any object or surface that may be contaminated  After touching an animal during a pet therapy session (hospital)  After touching an animal, cleaning up after a pet, or preparing food for pets (home)    Please contact [address] [place] [address] or the Orthopedic office with any questions or concerns after your discharge. If you have any issues or concerns after hours please call the Orthopedic Office to reach the Surgeon on call first at  [phone]. If you need a pain medication refill please contact your surgeon's office.      [facility] Orthoped[facility]:    Monday-Friday 8am- 5pm      [phone]  Chavo Oliva Nurse Navigator: Monday-Wednesday 8am- 5pm, Thursday 8am-3pm  [phone]    After Hours Clinic Walk in [facility]  [address] [facility], [address] [facility]    Monday-Friday 5pm-9pm  &  Saturday 9am-1pm          [facility] is participating in [address] [facility]placement ([facility]) [address] [facility] -

## 2023-07-25 NOTE — PROGRESS NOTES
C-diff Questionnaire:     * Admitted with diarrhea? [] YES    [x]  NO     *Prior history of C-Diff. In last 3 months? [] YES    [x]  NO     *Antibiotic use in the past 6-8 weeks? [x]  NO    []  YES      If yes, which: REASON_________________     *Prior hospitalization or nursing home in the last month? []  YES    [x]  NO     SAFETY FIRST. .call before you fall    [address] time____per KING________        Surgery time____________    Do not eat or drink anything after 12:00 midnight prior to your surgery. This includes water chewing gum, mints and ice chips- the Day of Surgery. You may brush your teeth and gargle the morning of your surgery, but do not swallow the water     Please see your family doctor/pediatrician for a history and physical and/or questions concerning medications. Bring any test results/reports from your physicians office. If you are under the care of a heart doctor or specialist doctor, please be aware that you may be asked to them for clearance    You may be asked to stop blood thinners such as Coumadin, Plavix, Fragmin, Lovenox, etc., or any anti-inflammatories such as:  Aspirin, Ibuprofen, Advil, Naproxen prior to your surgery. We also ask that you stop any OTC medications such as fish oil, vitamin E, glucosamine, garlic, Multivitamins, COQ 10, etc.    We ask that you do not smoke 24 hours prior to surgery  We ask that you do not  drink any alcoholic beverages 24 hours prior to surgery     You must make arrangements for a responsible adult to take you home after your surgery. For your safety you will not be allowed to leave alone or drive yourself home. Your surgery will be cancelled if you do not have a ride home. Also for your safety, it is strongly suggested that someone stay with you the first 24 hours after your surgery.      A parent or legal guardian must accompany a child scheduled for surgery and are generalized instructions for all surgical cases, you may be provided with more specific instructions according to your surgery.

## 2023-07-26 NOTE — CARE COORDINATION
Received call from patient -she has reviewed SNF list   She expressed interest in going to [facility] facility at discharge  Referral made per [facility]   Call placed to [facility] # [phone] to check status of referral-- will await return call.   Electronically signed by Yung Boyle on 7/26/2023 at 4:27 PM  #[phone]

## 2023-07-27 ENCOUNTER — OFFICE VISIT (OUTPATIENT)
Dept: ORTHOPEDIC SURGERY | Age: 68
End: 2023-07-27
Payer: MEDICARE

## 2023-07-27 ENCOUNTER — TELEPHONE (OUTPATIENT)
Dept: ORTHOPEDICS UNIT | Age: 68
End: 2023-07-27

## 2023-07-27 VITALS — WEIGHT: 160 LBS | HEIGHT: 63 IN | BODY MASS INDEX: 28.35 KG/M2

## 2023-07-27 DIAGNOSIS — M16.12 PRIMARY OSTEOARTHRITIS OF LEFT HIP: Primary | ICD-10-CM

## 2023-07-27 PROCEDURE — 3017F COLORECTAL CA SCREEN DOC REV: CPT | Performed by: ORTHOPAEDIC SURGERY

## 2023-07-27 PROCEDURE — 99214 OFFICE O/P EST MOD 30 MIN: CPT | Performed by: ORTHOPAEDIC SURGERY

## 2023-07-27 PROCEDURE — 1090F PRES/ABSN URINE INCON ASSESS: CPT | Performed by: ORTHOPAEDIC SURGERY

## 2023-07-27 PROCEDURE — G8427 DOCREV CUR MEDS BY ELIG CLIN: HCPCS | Performed by: ORTHOPAEDIC SURGERY

## 2023-07-27 PROCEDURE — 4004F PT TOBACCO SCREEN RCVD TLK: CPT | Performed by: ORTHOPAEDIC SURGERY

## 2023-07-27 PROCEDURE — G8399 PT W/DXA RESULTS DOCUMENT: HCPCS | Performed by: ORTHOPAEDIC SURGERY

## 2023-07-27 PROCEDURE — G8417 CALC BMI ABV UP PARAM F/U: HCPCS | Performed by: ORTHOPAEDIC SURGERY

## 2023-07-27 PROCEDURE — 1124F ACP DISCUSS-NO DSCNMKR DOCD: CPT | Performed by: ORTHOPAEDIC SURGERY

## 2023-07-27 NOTE — PROGRESS NOTES
[address] [facility]  Office Visit    Chief Complaint: Left hip pain    HPI:  Charan Longoria is a 78 y.o. who is here in follow-up of left hip pain. She is scheduled for left total hip arthroplasty next week. For review, she underwent intra-articular left hip steroid injection in January 2023 which significantly relieved her symptoms. However, her symptoms gradually returned and she now reports groin pain and walks on a daily basis. There is no history of injury or surgery. She is also seeing [place] for her spine. She reports pain in her left buttock. She has been in physical therapy which is helped her spine and her hip. She also occasionally takes Percocet for pain. The patient has difficulty putting on socks and shoes, difficulty getting out of a car, difficulty with ambulation and doing activities of daily living including pain at night. Pain at rest is 7 and with activities 9-10/10. She denies diabetes, history of blood clots, blood thinners, heart or lung issues. She still occasionally smokes a few cigarettes. She has CLL. She has help at home. She is taking naproxen, which helps with pain. She walks without assistive device. 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  rate 16, height 5' 3\" (1.6 m), weight 169 lb (76.7 kg)    Appearance: sitting in exam room chair, appears to be in no acute distress, awake and alert  Resp: unlabored breathing on room air  Skin: warm, dry and intact with out erythema or significant

## 2023-07-27 NOTE — TELEPHONE ENCOUNTER
Patient called with questions regarding length of time for surgery. Discussed with patient and no further questions at this time.  Electronically signed by Nancy Edwards RN on 7/27/2023 at 11:50 AM

## 2023-07-31 ENCOUNTER — ANESTHESIA EVENT (OUTPATIENT)
Dept: OPERATING ROOM | Age: 68
End: 2023-07-31
Payer: MEDICARE

## 2023-08-01 ENCOUNTER — APPOINTMENT (OUTPATIENT)
Dept: GENERAL RADIOLOGY | Age: 68
End: 2023-08-01
Attending: ORTHOPAEDIC SURGERY
Payer: MEDICARE

## 2023-08-01 ENCOUNTER — ANESTHESIA (OUTPATIENT)
Dept: OPERATING ROOM | Age: 68
End: 2023-08-01
Payer: MEDICARE

## 2023-08-01 ENCOUNTER — HOSPITAL ENCOUNTER (OUTPATIENT)
Age: 68
Setting detail: OBSERVATION
Discharge: NURSING, REHAB OR LONG-TERM CARE | End: 2023-08-02
Attending: ORTHOPAEDIC SURGERY | Admitting: ORTHOPAEDIC SURGERY
Payer: MEDICARE

## 2023-08-01 ENCOUNTER — CARE COORDINATION (OUTPATIENT)
Dept: CARE COORDINATION | Facility: CLINIC | Age: 68
End: 2023-08-01

## 2023-08-01 ENCOUNTER — CLINICAL DOCUMENTATION (OUTPATIENT)
Dept: CARE COORDINATION | Facility: CLINIC | Age: 68
End: 2023-08-01

## 2023-08-01 DIAGNOSIS — M16.12 PRIMARY OSTEOARTHRITIS OF LEFT HIP: Primary | ICD-10-CM

## 2023-08-01 LAB
ABO + RH BLD: NORMAL
BLD GP AB SCN SERPL QL: NORMAL
GLUCOSE BLD-MCNC: 146 MG/DL (ref 70–99)
GLUCOSE BLD-MCNC: 146 MG/DL (ref 70–99)
GLUCOSE BLD-MCNC: 165 MG/DL (ref 70–99)
GLUCOSE BLD-MCNC: 168 MG/DL (ref 70–99)
GLUCOSE BLD-MCNC: 203 MG/DL (ref 70–99)
PERFORMED ON: ABNORMAL

## 2023-08-01 PROCEDURE — 97530 THERAPEUTIC ACTIVITIES: CPT

## 2023-08-01 PROCEDURE — C9399 UNCLASSIFIED DRUGS OR BIOLOG: HCPCS

## 2023-08-01 PROCEDURE — 6370000000 HC RX 637 (ALT 250 FOR IP): Performed by: ORTHOPAEDIC SURGERY

## 2023-08-01 PROCEDURE — A4216 STERILE WATER/SALINE, 10 ML: HCPCS | Performed by: ORTHOPAEDIC SURGERY

## 2023-08-01 PROCEDURE — 7100000000 HC PACU RECOVERY - FIRST 15 MIN: Performed by: ORTHOPAEDIC SURGERY

## 2023-08-01 PROCEDURE — 2580000003 HC RX 258: Performed by: ORTHOPAEDIC SURGERY

## 2023-08-01 PROCEDURE — 6360000002 HC RX W HCPCS: Performed by: ANESTHESIOLOGY

## 2023-08-01 PROCEDURE — 2500000003 HC RX 250 WO HCPCS

## 2023-08-01 PROCEDURE — 6360000002 HC RX W HCPCS: Performed by: ORTHOPAEDIC SURGERY

## 2023-08-01 PROCEDURE — 3600000015 HC SURGERY LEVEL 5 ADDTL 15MIN: Performed by: ORTHOPAEDIC SURGERY

## 2023-08-01 PROCEDURE — 7100000001 HC PACU RECOVERY - ADDTL 15 MIN: Performed by: ORTHOPAEDIC SURGERY

## 2023-08-01 PROCEDURE — 27130 TOTAL HIP ARTHROPLASTY: CPT | Performed by: PHYSICIAN ASSISTANT

## 2023-08-01 PROCEDURE — G0378 HOSPITAL OBSERVATION PER HR: HCPCS

## 2023-08-01 PROCEDURE — 2709999900 HC NON-CHARGEABLE SUPPLY: Performed by: ORTHOPAEDIC SURGERY

## 2023-08-01 PROCEDURE — 27130 TOTAL HIP ARTHROPLASTY: CPT | Performed by: ORTHOPAEDIC SURGERY

## 2023-08-01 PROCEDURE — 97161 PT EVAL LOW COMPLEX 20 MIN: CPT

## 2023-08-01 PROCEDURE — 3600000005 HC SURGERY LEVEL 5 BASE: Performed by: ORTHOPAEDIC SURGERY

## 2023-08-01 PROCEDURE — 2580000003 HC RX 258: Performed by: ANESTHESIOLOGY

## 2023-08-01 PROCEDURE — 86901 BLOOD TYPING SEROLOGIC RH(D): CPT

## 2023-08-01 PROCEDURE — 97166 OT EVAL MOD COMPLEX 45 MIN: CPT

## 2023-08-01 PROCEDURE — C9290 INJ, BUPIVACAINE LIPOSOME: HCPCS | Performed by: ORTHOPAEDIC SURGERY

## 2023-08-01 PROCEDURE — 86900 BLOOD TYPING SEROLOGIC ABO: CPT

## 2023-08-01 PROCEDURE — 73501 X-RAY EXAM HIP UNI 1 VIEW: CPT

## 2023-08-01 PROCEDURE — C1776 JOINT DEVICE (IMPLANTABLE): HCPCS | Performed by: ORTHOPAEDIC SURGERY

## 2023-08-01 PROCEDURE — 64447 NJX AA&/STRD FEMORAL NRV IMG: CPT | Performed by: ANESTHESIOLOGY

## 2023-08-01 PROCEDURE — A4217 STERILE WATER/SALINE, 500 ML: HCPCS | Performed by: ORTHOPAEDIC SURGERY

## 2023-08-01 PROCEDURE — 3700000001 HC ADD 15 MINUTES (ANESTHESIA): Performed by: ORTHOPAEDIC SURGERY

## 2023-08-01 PROCEDURE — 86850 RBC ANTIBODY SCREEN: CPT

## 2023-08-01 PROCEDURE — 3700000000 HC ANESTHESIA ATTENDED CARE: Performed by: ORTHOPAEDIC SURGERY

## 2023-08-01 PROCEDURE — 6360000002 HC RX W HCPCS

## 2023-08-01 PROCEDURE — 2720000010 HC SURG SUPPLY STERILE: Performed by: ORTHOPAEDIC SURGERY

## 2023-08-01 PROCEDURE — 94150 VITAL CAPACITY TEST: CPT

## 2023-08-01 DEVICE — ACTIS DUOFIX HIP PROSTHESIS (FEMORAL STEM 12/14 TAPER CEMENTLESS SIZE 3 HIGH COLLAR)  CE
Type: IMPLANTABLE DEVICE | Site: HIP | Status: FUNCTIONAL
Brand: ACTIS

## 2023-08-01 DEVICE — LINER ACET OD52MM ID36MM +4MM OFFSET HIP POLYETH MTL ON: Type: IMPLANTABLE DEVICE | Site: HIP | Status: FUNCTIONAL

## 2023-08-01 DEVICE — BIOLOX DELTA CERAMIC FEMORAL HEAD +1.5 36MM DIA 12/14 TAPER
Type: IMPLANTABLE DEVICE | Site: HIP | Status: FUNCTIONAL
Brand: BIOLOX DELTA

## 2023-08-01 DEVICE — PINNACLE GRIPTION ACETABULAR SHELL SECTOR 52MM OD
Type: IMPLANTABLE DEVICE | Site: HIP | Status: FUNCTIONAL
Brand: PINNACLE GRIPTION

## 2023-08-01 DEVICE — PINNACLE CANCELLOUS BONE SCREW 6.5MM X 30MM
Type: IMPLANTABLE DEVICE | Site: HIP | Status: FUNCTIONAL
Brand: PINNACLE

## 2023-08-01 RX ORDER — LORAZEPAM 2 MG/ML
0.25 INJECTION INTRAMUSCULAR
Status: COMPLETED | OUTPATIENT
Start: 2023-08-01 | End: 2023-08-01

## 2023-08-01 RX ORDER — FENTANYL CITRATE 50 UG/ML
INJECTION, SOLUTION INTRAMUSCULAR; INTRAVENOUS PRN
Status: DISCONTINUED | OUTPATIENT
Start: 2023-08-01 | End: 2023-08-01 | Stop reason: SDUPTHER

## 2023-08-01 RX ORDER — SUCCINYLCHOLINE/SOD CL,ISO/PF 200MG/10ML
SYRINGE (ML) INTRAVENOUS PRN
Status: DISCONTINUED | OUTPATIENT
Start: 2023-08-01 | End: 2023-08-01 | Stop reason: SDUPTHER

## 2023-08-01 RX ORDER — DEXTROSE MONOHYDRATE 100 MG/ML
INJECTION, SOLUTION INTRAVENOUS CONTINUOUS PRN
Status: DISCONTINUED | OUTPATIENT
Start: 2023-08-01 | End: 2023-08-02 | Stop reason: HOSPADM

## 2023-08-01 RX ORDER — MAGNESIUM HYDROXIDE 1200 MG/15ML
LIQUID ORAL CONTINUOUS PRN
Status: COMPLETED | OUTPATIENT
Start: 2023-08-01 | End: 2023-08-01

## 2023-08-01 RX ORDER — SODIUM CHLORIDE 0.9 % (FLUSH) 0.9 %
5-40 SYRINGE (ML) INJECTION PRN
Status: DISCONTINUED | OUTPATIENT
Start: 2023-08-01 | End: 2023-08-01 | Stop reason: HOSPADM

## 2023-08-01 RX ORDER — SODIUM CHLORIDE 450 MG/100ML
INJECTION, SOLUTION INTRAVENOUS CONTINUOUS
Status: DISCONTINUED | OUTPATIENT
Start: 2023-08-01 | End: 2023-08-02 | Stop reason: HOSPADM

## 2023-08-01 RX ORDER — FENTANYL CITRATE 50 UG/ML
25 INJECTION, SOLUTION INTRAMUSCULAR; INTRAVENOUS EVERY 5 MIN PRN
Status: DISCONTINUED | OUTPATIENT
Start: 2023-08-01 | End: 2023-08-01 | Stop reason: HOSPADM

## 2023-08-01 RX ORDER — ASPIRIN 81 MG/1
81 TABLET ORAL 2 TIMES DAILY
Qty: 60 TABLET | Refills: 0 | Status: SHIPPED | OUTPATIENT
Start: 2023-08-01 | End: 2023-08-31

## 2023-08-01 RX ORDER — OXYCODONE HYDROCHLORIDE 10 MG/1
10 TABLET ORAL EVERY 4 HOURS PRN
Status: DISCONTINUED | OUTPATIENT
Start: 2023-08-01 | End: 2023-08-02 | Stop reason: HOSPADM

## 2023-08-01 RX ORDER — SODIUM CHLORIDE 9 MG/ML
INJECTION, SOLUTION INTRAVENOUS PRN
Status: DISCONTINUED | OUTPATIENT
Start: 2023-08-01 | End: 2023-08-01 | Stop reason: HOSPADM

## 2023-08-01 RX ORDER — PROPOFOL 10 MG/ML
INJECTION, EMULSION INTRAVENOUS PRN
Status: DISCONTINUED | OUTPATIENT
Start: 2023-08-01 | End: 2023-08-01 | Stop reason: SDUPTHER

## 2023-08-01 RX ORDER — CEFAZOLIN SODIUM 1 G/3ML
2000 INJECTION, POWDER, FOR SOLUTION INTRAMUSCULAR; INTRAVENOUS ONCE
Status: DISCONTINUED | OUTPATIENT
Start: 2023-08-01 | End: 2023-08-01

## 2023-08-01 RX ORDER — OXYCODONE HYDROCHLORIDE 10 MG/1
10 TABLET ORAL ONCE
Status: COMPLETED | OUTPATIENT
Start: 2023-08-01 | End: 2023-08-01

## 2023-08-01 RX ORDER — INSULIN GLARGINE 100 [IU]/ML
0.25 INJECTION, SOLUTION SUBCUTANEOUS NIGHTLY
Status: DISCONTINUED | OUTPATIENT
Start: 2023-08-01 | End: 2023-08-02 | Stop reason: HOSPADM

## 2023-08-01 RX ORDER — OXYCODONE HYDROCHLORIDE 5 MG/1
5 TABLET ORAL EVERY 4 HOURS PRN
Status: DISCONTINUED | OUTPATIENT
Start: 2023-08-01 | End: 2023-08-02 | Stop reason: HOSPADM

## 2023-08-01 RX ORDER — PREGABALIN 75 MG/1
75 CAPSULE ORAL 2 TIMES DAILY
Qty: 28 CAPSULE | Refills: 0 | Status: SHIPPED | OUTPATIENT
Start: 2023-08-01 | End: 2023-08-15

## 2023-08-01 RX ORDER — NAPROXEN 500 MG/1
500 TABLET ORAL 2 TIMES DAILY WITH MEALS
Qty: 60 TABLET | Refills: 0
Start: 2023-08-01

## 2023-08-01 RX ORDER — LIDOCAINE HYDROCHLORIDE 20 MG/ML
INJECTION, SOLUTION EPIDURAL; INFILTRATION; INTRACAUDAL; PERINEURAL PRN
Status: DISCONTINUED | OUTPATIENT
Start: 2023-08-01 | End: 2023-08-01 | Stop reason: SDUPTHER

## 2023-08-01 RX ORDER — ACETAMINOPHEN 325 MG/1
650 TABLET ORAL EVERY 6 HOURS SCHEDULED
Status: DISCONTINUED | OUTPATIENT
Start: 2023-08-01 | End: 2023-08-02 | Stop reason: HOSPADM

## 2023-08-01 RX ORDER — PREGABALIN 75 MG/1
75 CAPSULE ORAL 2 TIMES DAILY
Status: DISCONTINUED | OUTPATIENT
Start: 2023-08-01 | End: 2023-08-02 | Stop reason: HOSPADM

## 2023-08-01 RX ORDER — ATORVASTATIN CALCIUM 40 MG/1
40 TABLET, FILM COATED ORAL DAILY
Status: DISCONTINUED | OUTPATIENT
Start: 2023-08-02 | End: 2023-08-02 | Stop reason: HOSPADM

## 2023-08-01 RX ORDER — ONDANSETRON 2 MG/ML
INJECTION INTRAMUSCULAR; INTRAVENOUS PRN
Status: DISCONTINUED | OUTPATIENT
Start: 2023-08-01 | End: 2023-08-01 | Stop reason: SDUPTHER

## 2023-08-01 RX ORDER — DEXAMETHASONE SODIUM PHOSPHATE 4 MG/ML
INJECTION, SOLUTION INTRA-ARTICULAR; INTRALESIONAL; INTRAMUSCULAR; INTRAVENOUS; SOFT TISSUE PRN
Status: DISCONTINUED | OUTPATIENT
Start: 2023-08-01 | End: 2023-08-01 | Stop reason: SDUPTHER

## 2023-08-01 RX ORDER — MELOXICAM 7.5 MG/1
7.5 TABLET ORAL DAILY
Qty: 5 TABLET | Refills: 0 | Status: SHIPPED | OUTPATIENT
Start: 2023-08-02 | End: 2023-08-07

## 2023-08-01 RX ORDER — SODIUM CHLORIDE 9 MG/ML
INJECTION, SOLUTION INTRAVENOUS PRN
Status: DISCONTINUED | OUTPATIENT
Start: 2023-08-01 | End: 2023-08-02 | Stop reason: HOSPADM

## 2023-08-01 RX ORDER — ONDANSETRON 4 MG/1
4 TABLET, ORALLY DISINTEGRATING ORAL EVERY 8 HOURS PRN
Status: DISCONTINUED | OUTPATIENT
Start: 2023-08-01 | End: 2023-08-02 | Stop reason: HOSPADM

## 2023-08-01 RX ORDER — ONDANSETRON 2 MG/ML
4 INJECTION INTRAMUSCULAR; INTRAVENOUS EVERY 6 HOURS PRN
Status: DISCONTINUED | OUTPATIENT
Start: 2023-08-01 | End: 2023-08-02 | Stop reason: HOSPADM

## 2023-08-01 RX ORDER — PHENYLEPHRINE HCL IN 0.9% NACL 1 MG/10 ML
SYRINGE (ML) INTRAVENOUS PRN
Status: DISCONTINUED | OUTPATIENT
Start: 2023-08-01 | End: 2023-08-01 | Stop reason: SDUPTHER

## 2023-08-01 RX ORDER — SODIUM CHLORIDE 0.9 % (FLUSH) 0.9 %
5-40 SYRINGE (ML) INJECTION EVERY 12 HOURS SCHEDULED
Status: DISCONTINUED | OUTPATIENT
Start: 2023-08-01 | End: 2023-08-02 | Stop reason: HOSPADM

## 2023-08-01 RX ORDER — ROPIVACAINE HYDROCHLORIDE 5 MG/ML
INJECTION, SOLUTION EPIDURAL; INFILTRATION; PERINEURAL
Status: COMPLETED | OUTPATIENT
Start: 2023-08-01 | End: 2023-08-01

## 2023-08-01 RX ORDER — MORPHINE SULFATE 4 MG/ML
4 INJECTION, SOLUTION INTRAMUSCULAR; INTRAVENOUS
Status: DISCONTINUED | OUTPATIENT
Start: 2023-08-01 | End: 2023-08-02 | Stop reason: HOSPADM

## 2023-08-01 RX ORDER — SODIUM CHLORIDE 0.9 % (FLUSH) 0.9 %
5-40 SYRINGE (ML) INJECTION EVERY 12 HOURS SCHEDULED
Status: DISCONTINUED | OUTPATIENT
Start: 2023-08-01 | End: 2023-08-01 | Stop reason: HOSPADM

## 2023-08-01 RX ORDER — ASPIRIN 81 MG/1
81 TABLET ORAL 2 TIMES DAILY
Status: DISCONTINUED | OUTPATIENT
Start: 2023-08-01 | End: 2023-08-02 | Stop reason: HOSPADM

## 2023-08-01 RX ORDER — INSULIN LISPRO 100 [IU]/ML
0-4 INJECTION, SOLUTION INTRAVENOUS; SUBCUTANEOUS NIGHTLY
Status: DISCONTINUED | OUTPATIENT
Start: 2023-08-01 | End: 2023-08-02 | Stop reason: HOSPADM

## 2023-08-01 RX ORDER — KETOROLAC TROMETHAMINE 15 MG/ML
15 INJECTION, SOLUTION INTRAMUSCULAR; INTRAVENOUS
Status: ACTIVE | OUTPATIENT
Start: 2023-08-01 | End: 2023-08-02

## 2023-08-01 RX ORDER — SODIUM CHLORIDE 0.9 % (FLUSH) 0.9 %
5-40 SYRINGE (ML) INJECTION PRN
Status: DISCONTINUED | OUTPATIENT
Start: 2023-08-01 | End: 2023-08-02 | Stop reason: HOSPADM

## 2023-08-01 RX ORDER — CALCIUM CARBONATE 500 MG/1
500 TABLET, CHEWABLE ORAL 3 TIMES DAILY PRN
Status: DISCONTINUED | OUTPATIENT
Start: 2023-08-01 | End: 2023-08-02 | Stop reason: HOSPADM

## 2023-08-01 RX ORDER — MIDAZOLAM HYDROCHLORIDE 1 MG/ML
INJECTION INTRAMUSCULAR; INTRAVENOUS PRN
Status: DISCONTINUED | OUTPATIENT
Start: 2023-08-01 | End: 2023-08-01 | Stop reason: SDUPTHER

## 2023-08-01 RX ORDER — LORAZEPAM 2 MG/ML
0.25 INJECTION INTRAMUSCULAR
Status: ACTIVE | OUTPATIENT
Start: 2023-08-01 | End: 2023-08-02

## 2023-08-01 RX ORDER — ONDANSETRON 2 MG/ML
4 INJECTION INTRAMUSCULAR; INTRAVENOUS
Status: COMPLETED | OUTPATIENT
Start: 2023-08-01 | End: 2023-08-01

## 2023-08-01 RX ORDER — INSULIN LISPRO 100 [IU]/ML
0-4 INJECTION, SOLUTION INTRAVENOUS; SUBCUTANEOUS
Status: DISCONTINUED | OUTPATIENT
Start: 2023-08-01 | End: 2023-08-02 | Stop reason: HOSPADM

## 2023-08-01 RX ORDER — TRANEXAMIC ACID 650 MG/1
1950 TABLET ORAL ONCE
Status: COMPLETED | OUTPATIENT
Start: 2023-08-01 | End: 2023-08-01

## 2023-08-01 RX ORDER — ROCURONIUM BROMIDE 10 MG/ML
INJECTION, SOLUTION INTRAVENOUS PRN
Status: DISCONTINUED | OUTPATIENT
Start: 2023-08-01 | End: 2023-08-01 | Stop reason: SDUPTHER

## 2023-08-01 RX ORDER — OXYCODONE HYDROCHLORIDE 5 MG/1
5-10 TABLET ORAL EVERY 6 HOURS PRN
Qty: 40 TABLET | Refills: 0 | Status: SHIPPED | OUTPATIENT
Start: 2023-08-01 | End: 2023-08-06

## 2023-08-01 RX ORDER — MELOXICAM 7.5 MG/1
7.5 TABLET ORAL ONCE
Status: COMPLETED | OUTPATIENT
Start: 2023-08-01 | End: 2023-08-01

## 2023-08-01 RX ORDER — MORPHINE SULFATE 2 MG/ML
2 INJECTION, SOLUTION INTRAMUSCULAR; INTRAVENOUS
Status: DISCONTINUED | OUTPATIENT
Start: 2023-08-01 | End: 2023-08-02 | Stop reason: HOSPADM

## 2023-08-01 RX ORDER — MELOXICAM 7.5 MG/1
7.5 TABLET ORAL DAILY
Status: DISCONTINUED | OUTPATIENT
Start: 2023-08-02 | End: 2023-08-02 | Stop reason: HOSPADM

## 2023-08-01 RX ORDER — INSULIN LISPRO 100 [IU]/ML
0.08 INJECTION, SOLUTION INTRAVENOUS; SUBCUTANEOUS
Status: DISCONTINUED | OUTPATIENT
Start: 2023-08-01 | End: 2023-08-02 | Stop reason: HOSPADM

## 2023-08-01 RX ADMIN — HYDROMORPHONE HYDROCHLORIDE 0.5 MG: 1 INJECTION, SOLUTION INTRAMUSCULAR; INTRAVENOUS; SUBCUTANEOUS at 10:43

## 2023-08-01 RX ADMIN — INSULIN LISPRO 6 UNITS: 100 INJECTION, SOLUTION INTRAVENOUS; SUBCUTANEOUS at 18:49

## 2023-08-01 RX ADMIN — ONDANSETRON 4 MG: 2 INJECTION INTRAMUSCULAR; INTRAVENOUS at 08:58

## 2023-08-01 RX ADMIN — SODIUM CHLORIDE 2000 MG: 900 INJECTION INTRAVENOUS at 17:12

## 2023-08-01 RX ADMIN — Medication 100 MCG: at 10:11

## 2023-08-01 RX ADMIN — ROCURONIUM BROMIDE 5 MG: 10 INJECTION, SOLUTION INTRAVENOUS at 08:52

## 2023-08-01 RX ADMIN — FENTANYL CITRATE 50 MCG: 50 INJECTION INTRAMUSCULAR; INTRAVENOUS at 08:52

## 2023-08-01 RX ADMIN — ACETAMINOPHEN 650 MG: 325 TABLET ORAL at 14:42

## 2023-08-01 RX ADMIN — Medication 200 MCG: at 09:09

## 2023-08-01 RX ADMIN — Medication 100 MCG: at 09:06

## 2023-08-01 RX ADMIN — SODIUM CHLORIDE, PRESERVATIVE FREE 10 ML: 5 INJECTION INTRAVENOUS at 21:13

## 2023-08-01 RX ADMIN — LIDOCAINE HYDROCHLORIDE 80 MG: 20 INJECTION, SOLUTION EPIDURAL; INFILTRATION; INTRACAUDAL; PERINEURAL at 08:52

## 2023-08-01 RX ADMIN — SODIUM CHLORIDE: 4.5 INJECTION, SOLUTION INTRAVENOUS at 14:52

## 2023-08-01 RX ADMIN — LORAZEPAM 0.25 MG: 2 INJECTION INTRAMUSCULAR; INTRAVENOUS at 10:53

## 2023-08-01 RX ADMIN — HYDROMORPHONE HYDROCHLORIDE 0.5 MG: 1 INJECTION, SOLUTION INTRAMUSCULAR; INTRAVENOUS; SUBCUTANEOUS at 10:35

## 2023-08-01 RX ADMIN — Medication 100 MCG: at 10:07

## 2023-08-01 RX ADMIN — MELOXICAM 7.5 MG: 7.5 TABLET ORAL at 07:00

## 2023-08-01 RX ADMIN — DEXAMETHASONE SODIUM PHOSPHATE 8 MG: 4 INJECTION, SOLUTION INTRAMUSCULAR; INTRAVENOUS at 08:58

## 2023-08-01 RX ADMIN — OXYCODONE HYDROCHLORIDE 5 MG: 5 TABLET ORAL at 17:10

## 2023-08-01 RX ADMIN — ACETAMINOPHEN 650 MG: 325 TABLET ORAL at 18:49

## 2023-08-01 RX ADMIN — HYDROMORPHONE HYDROCHLORIDE 0.5 MG: 1 INJECTION, SOLUTION INTRAMUSCULAR; INTRAVENOUS; SUBCUTANEOUS at 11:12

## 2023-08-01 RX ADMIN — Medication 200 MCG: at 10:09

## 2023-08-01 RX ADMIN — PROPOFOL 150 MG: 10 INJECTION, EMULSION INTRAVENOUS at 08:52

## 2023-08-01 RX ADMIN — MIDAZOLAM 1 MG: 1 INJECTION INTRAMUSCULAR; INTRAVENOUS at 08:01

## 2023-08-01 RX ADMIN — OXYCODONE HYDROCHLORIDE 10 MG: 10 TABLET ORAL at 07:00

## 2023-08-01 RX ADMIN — SODIUM CHLORIDE: 9 INJECTION, SOLUTION INTRAVENOUS at 08:48

## 2023-08-01 RX ADMIN — PREGABALIN 75 MG: 75 CAPSULE ORAL at 21:04

## 2023-08-01 RX ADMIN — CEFAZOLIN 2000 MG: 2 INJECTION, POWDER, FOR SOLUTION INTRAMUSCULAR; INTRAVENOUS at 08:58

## 2023-08-01 RX ADMIN — ROCURONIUM BROMIDE 45 MG: 10 INJECTION, SOLUTION INTRAVENOUS at 08:58

## 2023-08-01 RX ADMIN — SODIUM CHLORIDE: 9 INJECTION, SOLUTION INTRAVENOUS at 07:00

## 2023-08-01 RX ADMIN — TRANEXAMIC ACID 1950 MG: 650 TABLET ORAL at 07:00

## 2023-08-01 RX ADMIN — INSULIN LISPRO 6 UNITS: 100 INJECTION, SOLUTION INTRAVENOUS; SUBCUTANEOUS at 14:42

## 2023-08-01 RX ADMIN — FENTANYL CITRATE 50 MCG: 50 INJECTION INTRAMUSCULAR; INTRAVENOUS at 08:01

## 2023-08-01 RX ADMIN — HYDROMORPHONE HYDROCHLORIDE 0.5 MG: 1 INJECTION, SOLUTION INTRAMUSCULAR; INTRAVENOUS; SUBCUTANEOUS at 10:52

## 2023-08-01 RX ADMIN — HYDROMORPHONE HYDROCHLORIDE 0.5 MG: 1 INJECTION, SOLUTION INTRAMUSCULAR; INTRAVENOUS; SUBCUTANEOUS at 09:48

## 2023-08-01 RX ADMIN — ONDANSETRON 4 MG: 2 INJECTION INTRAMUSCULAR; INTRAVENOUS at 10:54

## 2023-08-01 RX ADMIN — INSULIN GLARGINE 18 UNITS: 100 INJECTION, SOLUTION SUBCUTANEOUS at 21:04

## 2023-08-01 RX ADMIN — ASPIRIN 81 MG: 81 TABLET, COATED ORAL at 21:04

## 2023-08-01 RX ADMIN — Medication 100 MG: at 08:52

## 2023-08-01 RX ADMIN — SUGAMMADEX 200 MG: 100 INJECTION, SOLUTION INTRAVENOUS at 10:10

## 2023-08-01 RX ADMIN — OXYCODONE HYDROCHLORIDE 10 MG: 10 TABLET ORAL at 21:04

## 2023-08-01 RX ADMIN — ROPIVACAINE HYDROCHLORIDE 30 ML: 5 INJECTION, SOLUTION EPIDURAL; INFILTRATION; PERINEURAL at 08:01

## 2023-08-01 RX ADMIN — MIDAZOLAM 1 MG: 1 INJECTION INTRAMUSCULAR; INTRAVENOUS at 08:48

## 2023-08-01 RX ADMIN — Medication 100 MCG: at 08:58

## 2023-08-01 ASSESSMENT — PAIN DESCRIPTION - DESCRIPTORS
DESCRIPTORS: STABBING
DESCRIPTORS: DULL;DISCOMFORT
DESCRIPTORS: ACHING;SHARP
DESCRIPTORS: STABBING
DESCRIPTORS: STABBING
DESCRIPTORS: ACHING
DESCRIPTORS: STABBING;ACHING
DESCRIPTORS: STABBING

## 2023-08-01 ASSESSMENT — PAIN DESCRIPTION - ORIENTATION
ORIENTATION: LEFT

## 2023-08-01 ASSESSMENT — PAIN - FUNCTIONAL ASSESSMENT
PAIN_FUNCTIONAL_ASSESSMENT: PREVENTS OR INTERFERES SOME ACTIVE ACTIVITIES AND ADLS
PAIN_FUNCTIONAL_ASSESSMENT: ACTIVITIES ARE NOT PREVENTED
PAIN_FUNCTIONAL_ASSESSMENT: 0-10
PAIN_FUNCTIONAL_ASSESSMENT: PREVENTS OR INTERFERES SOME ACTIVE ACTIVITIES AND ADLS

## 2023-08-01 ASSESSMENT — PAIN SCALES - GENERAL
PAINLEVEL_OUTOF10: 3
PAINLEVEL_OUTOF10: 9
PAINLEVEL_OUTOF10: 9
PAINLEVEL_OUTOF10: 8
PAINLEVEL_OUTOF10: 3
PAINLEVEL_OUTOF10: 6
PAINLEVEL_OUTOF10: 0
PAINLEVEL_OUTOF10: 7
PAINLEVEL_OUTOF10: 7
PAINLEVEL_OUTOF10: 9
PAINLEVEL_OUTOF10: 6

## 2023-08-01 ASSESSMENT — PAIN DESCRIPTION - LOCATION
LOCATION: HIP
LOCATION: LEG;BACK
LOCATION: HIP

## 2023-08-01 ASSESSMENT — PAIN DESCRIPTION - PAIN TYPE
TYPE: SURGICAL PAIN
TYPE: ACUTE PAIN

## 2023-08-01 ASSESSMENT — PAIN DESCRIPTION - FREQUENCY
FREQUENCY: INTERMITTENT
FREQUENCY: CONTINUOUS
FREQUENCY: INTERMITTENT

## 2023-08-01 ASSESSMENT — PAIN DESCRIPTION - ONSET
ONSET: ON-GOING
ONSET: GRADUAL
ONSET: ON-GOING

## 2023-08-01 ASSESSMENT — PAIN SCALES - WONG BAKER: WONGBAKER_NUMERICALRESPONSE: 0

## 2023-08-01 NOTE — H&P
Update History & Physical    The patient's History and Physical of July 5, 2023 was reviewed with the patient and I examined the patient. There was no change. The surgical site was confirmed by the patient and me. Plan: The risks, benefits, expected outcome, and alternative to the recommended procedure have been discussed with the patient. Patient understands and wants to proceed with the procedure.      Electronically signed by David Hernandez MD on 8/1/2023 at 8:40 AM

## 2023-08-01 NOTE — CARE COORDINATION
Call placed to Radha brady/ [facility] # [phone] to check status of referral  Will await return call.   Electronically signed by George Bonilla on 8/1/2023 at 10:18 AM'  #[phone]

## 2023-08-01 NOTE — PROGRESS NOTES
Pt arrived to unit oriented to room 3102, fall precautions in place, bed alarm on. Will continue to monitor.    Electronically signed by Sam Albarran RN on 8/1/2023 at 11:59 AM

## 2023-08-01 NOTE — PROGRESS NOTES
Occupational Therapy  Facility/Department: [address] ORTHOPEDICS  Occupational Therapy Initial Assessment    Name: Silvia Aguirre  : 1955  MRN: [id number]  Date of Service: 2023    Discharge Recommendations:  3-5 sessions per week      Silvia Aguirre scored a 17/24 on the AM-PAC ADL Inpatient form. Current research shows that an AM-PAC score of 17 or less is typically not associated with a discharge to the patient's home setting. Based on the patient's AM-PAC score and their current ADL deficits, it is recommended that the patient have 3-5 sessions per week of Occupational Therapy at d/c to increase the patient's independence. Please see assessment section for further patient specific details. If patient discharges prior to next session this note will serve as a discharge summary. Please see below for the latest assessment towards goals. Patient Diagnosis(es): There were no encounter diagnoses. Past Medical History:  has a past medical history of Arthritis, Cancer ([address]), Chronic back pain, Hyperlipidemia, Osteoarthritis, and Prolonged emergence from general anesthesia. Past Surgical History:  has a past surgical history that includes hernia repair; Hysterectomy; Cervix surgery; Shoulder arthroscopy (Right, 2021); and Total hip arthroplasty (Left, 2023). Treatment Diagnosis: decreased IND ADL/fxl mobility      Assessment   Performance deficits / Impairments: Decreased functional mobility ; Decreased safe awareness;Decreased balance;Decreased ADL status; Decreased cognition;Decreased endurance;Decreased high-level IADLs;Decreased strength;Decreased ROM  Assessment: 77 yo female admitted for L ROBERT. PMH: chronic back pain, OA R shoulder, CLL, opiod dependence, OA R knee. PTA: living alone and IND in all ADL/fxl mobility. Pt. impulsive throughout session with consistent cues to slow speed and for safety, no evidence of learning. Unable to follow hip precautions.  Required SBA bed mobility, CGA fxl tx with RW, CGA with fxl mobility with RW household distance, and Max A LB dressing. Anticipate SBA grooming, SBA UB dressing/bathing, Mod A LB dressing/bathing with AE, and SBA toileting based on cognition, hip precautions, and PLOF. Continue acute OT to address above deficits. Rec slow/mod pace skilled OT based on need for Mod I in all ADL d/t no support at home, fall risk, no carryover for hip precautions, and decreased IND ADL/fxl mobility  Treatment Diagnosis: decreased IND ADL/fxl mobility  Prognosis: Fair  Decision Making: Medium Complexity  REQUIRES OT FOLLOW-UP: Yes  Activity Tolerance  Activity Tolerance: Patient Tolerated treatment well  Activity Tolerance Comments: tolerated activity well with limited increased pain, but impulsive throughout with cues to slow down and be safe        Plan   Occupational Therapy Plan  Times Per Week: 2-3 sessions  Current Treatment Recommendations: Strengthening, ROM, Balance training, Functional mobility training, Pain management, Safety education & training, Patient/Caregiver education & training, Modalities, Positioning, Equipment evaluation, education, & procurement, Self-Care / ADL, Home management training     Restrictions  Restrictions/Precautions  Restrictions/Precautions: Fall Risk, Weight Bearing  Lower Extremity Weight Bearing Restrictions  Left Lower Extremity Weight Bearing: Weight Bearing As Tolerated  Position Activity Restriction  Hip Precautions: No hip external rotation, No hip extension, No hip flexion > 90 degrees  Other position/activity restrictions: No SLR. Subjective   General  Chart Reviewed: Yes  Patient assessed for rehabilitation services?: Yes  Additional Pertinent Hx: 79 yo female admitted for L ROBERT.  PMH: chronic back pain, OA R shoulder, CLL, opiod dependence, OA R knee  Family / Caregiver Present: No  Referring Practitioner: Unique Burrell MD  Diagnosis: L ROBERT  Subjective  Subjective: Pt. resting in bed upon arrival, agreebale to OT/PT

## 2023-08-01 NOTE — PROGRESS NOTES
4 Eyes Skin Assessment     NAME:  Jacy Brown  YOB: 1955  MEDICAL RECORD NUMBER:  [id number]    The patient is being assessed for  Admission    I agree that at least one RN has performed a thorough Head to Toe Skin Assessment on the patient. ALL assessment sites listed below have been assessed. Areas assessed by both nurses:    Head, Face, Ears, Shoulders, Back, Chest, Arms, Elbows, Hands, Sacrum. Buttock, Coccyx, Ischium, Legs. Feet and Heels, and Under Medical Devices         Does the Patient have a Wound?  No noted wound(s)       Nasim Prevention initiated by RN: No  Wound Care Orders initiated by RN: No    Pressure Injury (Stage 3,4, Unstageable, DTI, NWPT, and Complex wounds) if present, place Wound referral order by RN under : No    New Ostomies, if present place, Ostomy referral order under : No     Nurse 1 eSignature: Electronically signed by Zain Green RN on 8/1/23 at 6:58 PM EDT    **SHARE this note so that the co-signing nurse can place an eSignature**    Nurse 2 eSignature: {Esignature:[id number]}

## 2023-08-01 NOTE — ANESTHESIA POSTPROCEDURE EVALUATION
Department of Anesthesiology  Postprocedure Note    Patient:  Jacy Brown  MRN: [id number]  YOB: 1955  Date of evaluation: 8/1/2023      Procedure Summary     Date: 08/01/23 Room / Location: [address] [place] [address]    Anesthesia Start: 0848 Anesthesia Stop: 8660    Procedure: LEFT ANTERIOR TOTAL HIP REPLACEMENT WITH C-ARM (Left: Hip) Diagnosis:       Arthritis of left hip      (Arthritis of left hip [M16.12])    Surgeons: Stalin Alcantar MD Responsible Provider: Carmen Francois MD    Anesthesia Type: General, Regional ASA Status: 2          Anesthesia Type: General, Regional    Jorge Phase I: Jorge Score: 9    Jorge Phase II:        Anesthesia Post Evaluation    Level of consciousness: awake and alert  Airway patency: patent  Nausea & Vomiting: no nausea and no vomiting  Complications: no  Cardiovascular status: hemodynamically stable  Respiratory status: acceptable  Hydration status: stable  Pain management: adequate

## 2023-08-01 NOTE — CARE COORDINATION
08/01/23 1458   IMM Letter   Observation Status Letter date given: 08/01/23   Observation Status Letter time given: 1445   Observation Status Letter given to Patient/Family/Significant other/Guardian/POA/by: ADRIANNE letter explained to piero and copy provided per HANNAH Triana RN

## 2023-08-01 NOTE — PROGRESS NOTES
Met with patient at bedside, patient is alert and orientedx4. discussed role of nurse navigator. Reviewed reasons to call with questions or concerns, importance of TEDS, Incentive spirometer, pain medication, and physical and occupational therapy. 2/4 bed rails up, bed in lowest position, fall precautions in place, call light within reach. Pulses present bilaterally +2 pedal, no odor noted at surgical dressing left hip. dry Dressing intact with small spot of serosanguinous drainage noted. Ice in place. Nnamdi and scds on BLEs. Neurovascular checks performed and moderate dorsi and plantar flexions noted bilaterally, toes appear appropriate to ethnicity in color, cool to touch, patient reports numbness to LLE    DC Plan: SNF [facility]. Patient is trying to see if a friend/family can transport.   DME needs:n/a    Sana Hutton  Orthopedic Nurse Navigator  Phone number: [phone]    Future Appointments   Date Time Provider [address] [place] [address]   8/17/2023 11:00 AM Lauro Dumont MD [facility]   8/21/2023 10:00 AM MD ELIAS Cortez [facility]

## 2023-08-01 NOTE — PROGRESS NOTES
Patient A&O x4, sitting up in chair, on room air. Gets up with walker x1. IV fluid infusing in right AC. Complains of pain in L hip, Prn pain medications given. Call light within reach, able to make needs known, chair alarm on, fall precautions in place. Denies further needs at this time. Will continue to monitor.   Electronically signed by Nain Dao RN on 8/1/2023 at 6:06 PM

## 2023-08-01 NOTE — PROGRESS NOTES
Physical Therapy  Facility/Department: [address] ORTHOPEDICS  Physical Therapy Initial Assessment  This note serves as patient discharge summary if pt discharges prior to next PT visit        Name: Stephanie London  : 1955  MRN: [id number]  Date of Service: 2023    Discharge Recommendations:  Patient would benefit from continued therapy after discharge (3-5)   Stephanie London scored a 16/24 on the AM-PAC short mobility form. Current research shows that an AM-PAC score of 17 or less is typically not associated with a discharge to the patient's home setting. Based on the patient's AM-PAC score and their current functional mobility deficits, it is recommended that the patient have 3-5 sessions per week of Physical Therapy at d/c to increase the patient's independence. Please see assessment section for further patient specific details. Patient Diagnosis(es): The encounter diagnosis was Primary osteoarthritis of left hip. Past Medical History:  has a past medical history of Arthritis, Cancer ([address]), Chronic back pain, Hyperlipidemia, Osteoarthritis, and Prolonged emergence from general anesthesia. Past Surgical History:  has a past surgical history that includes hernia repair; Hysterectomy; Cervix surgery; Shoulder arthroscopy (Right, 2021); and Total hip arthroplasty (Left, 2023). Assessment   Body Structures, Functions, Activity Limitations Requiring Skilled Therapeutic Intervention: Decreased functional mobility ; Increased pain;Decreased safe awareness  Assessment: Prior to eective L THR via Dr. Cordova How 23, patient reportedly lived alone and reports independence in all functional mobility without device. Status 23:  Bed Mobility SBA. Transfers and wh walker gait 35' x 2 with CGA and continual cues for correct and safe technique. Patient educated in hip positional precautions via verbal and demonstration, but does not integrate this into practice. Little evidence of new learning.

## 2023-08-01 NOTE — ANESTHESIA PROCEDURE NOTES
Peripheral Block    Patient location during procedure: pre-op  Reason for block: post-op pain management and at surgeon's request  Start time: 8/1/2023 8:01 AM  End time: 8/1/2023 8:05 AM  Staffing  Performed: anesthesiologist   Anesthesiologist: Sri Burgess MD  Preanesthetic Checklist  Completed: patient identified, IV checked, site marked, risks and benefits discussed, surgical/procedural consents, equipment checked, pre-op evaluation, timeout performed, anesthesia consent given, oxygen available, monitors applied/VS acknowledged, fire risk safety assessment completed and verbalized and blood product R/B/A discussed and consented  Peripheral Block   Patient position: supine  Prep: DuraPrep  Provider prep: mask and sterile gloves  Patient monitoring: continuous pulse ox, IV access, oxygen and responsive to questions  Block type: PENG  Laterality: left  Injection technique: single-shot  Guidance: ultrasound guided    Needle   Needle type: insulated echogenic nerve stimulator needle   Needle gauge: 20 G  Needle localization: ultrasound guidance  Needle insertion depth: 5 cm  Needle length: 8 cm  Assessment   Injection assessment: negative aspiration for heme, no paresthesia on injection, local visualized surrounding nerve on ultrasound and no intravascular symptoms  Paresthesia pain: none  Slow fractionated injection: yes  Hemodynamics: stable  Real-time US image taken/store: yes  Outcomes: uncomplicated and patient tolerated procedure well    Medications Administered  ropivacaine (NAROPIN) injection 0.5% - Perineural   30 mL - 8/1/2023 8:01:00 AM

## 2023-08-01 NOTE — PROGRESS NOTES
Patient dozing off and on, arouses easily to name. Left hip pain improved to 6 of 10 and tolerable. Resp easy unlabored on 2L NC with SaO2 92%. Left hip surgical dressing dry and intact with ice pack on. IV patent to right AC. Moving all extremities to command. VSS.

## 2023-08-01 NOTE — PROGRESS NOTES
0-4 Units, 0-4 Units, SubCUTAneous, TID WC  insulin lispro (HUMALOG) injection vial 0-4 Units, 0-4 Units, SubCUTAneous, Nightly  glucose chewable tablet 16 g, 4 tablet, Oral, PRN  dextrose bolus 10% 125 mL, 125 mL, IntraVENous, PRN **OR** dextrose bolus 10% 250 mL, 250 mL, IntraVENous, PRN  glucagon (rDNA) injection 1 mg, 1 mg, SubCUTAneous, PRN  dextrose 10 % infusion, , IntraVENous, Continuous PRN  0.45 % sodium chloride infusion, , IntraVENous, Continuous  sodium chloride flush 0.9 % injection 5-40 mL, 5-40 mL, IntraVENous, 2 times per day  sodium chloride flush 0.9 % injection 5-40 mL, 5-40 mL, IntraVENous, PRN  0.9 % sodium chloride infusion, , IntraVENous, PRN  ceFAZolin (ANCEF) 2,000 mg in sodium chloride 0.9 % 50 mL IVPB (mini-bag), 2,000 mg, IntraVENous, Q8H  acetaminophen (TYLENOL) tablet 650 mg, 650 mg, Oral, 4 times per day  ketorolac (TORADOL) injection 15 mg, 15 mg, IntraVENous, Once PRN  oxyCODONE (ROXICODONE) immediate release tablet 5 mg, 5 mg, Oral, Q4H PRN **OR** oxyCODONE HCl (OXY-IR) immediate release tablet 10 mg, 10 mg, Oral, Q4H PRN  morphine (PF) injection 2 mg, 2 mg, IntraVENous, Q3H PRN **OR** morphine (PF) injection 4 mg, 4 mg, IntraVENous, Q3H PRN  ondansetron (ZOFRAN-ODT) disintegrating tablet 4 mg, 4 mg, Oral, Q8H PRN **OR** ondansetron (ZOFRAN) injection 4 mg, 4 mg, IntraVENous, Q6H PRN  aspirin EC tablet 81 mg, 81 mg, Oral, BID  calcium carbonate (TUMS) chewable tablet 500 mg, 500 mg, Oral, TID PRN  pregabalin (LYRICA) capsule 75 mg, 75 mg, Oral, BID  [START ON 8/2/2023] meloxicam (MOBIC) tablet 7.5 mg, 7.5 mg, Oral, Daily  LORazepam (ATIVAN) injection 0.26 mg, 0.26 mg, IntraVENous, Once PRN    ASSESSMENT AND PLAN    Post left ROBERT, stable exam  DVT prophylaxis ordered, ASA 81mg twice at day for 30 days for DVT prophylaxis   PT OT for ADL's and ambulation as tolerated, WBAT, Anterior hip precautions  SS for DC planning, ECF planned.    IV or PO pain med as ordered       Ladena Six

## 2023-08-01 NOTE — CARE COORDINATION
08/01/23 1533   Service Assessment   Patient Orientation Alert and Oriented;Person;Place;Situation   Cognition Alert   History Provided By Patient   Primary Caregiver Self   Accompanied By/Relationship no one   [address] is: Legal Next of Kin   PCP Verified by CM Yes   Last Visit to PCP Within last 3 months   Prior Functional Level Independent in ADLs/IADLs   Current Functional Level Assistance with the following:;Mobility   Can patient return to prior living arrangement No   Ability to make needs known: Good   Family able to assist with home care needs: No   Would you like for me to discuss the discharge plan with any other family members/significant others, and if so, who? No   Financial Resources [place] None   CM/SW Referral Other (see comment)  (s/p surgery- dc planning)   Discharge Planning   Type of Residence House   Living Arrangements Alone   Current Services Prior To Admission None   Potential [address]   DME Ordered? No   Potential Assistance Purchasing Medications No   Type of Home Care Services None   Patient expects to be discharged to: [facility] Discharge   Transition of Care Consult (CM Consult) SNF   Services At/After Discharge [facility] (SNF)   Mode of Transport at Discharge Other (see comment)  (car)   Condition of Participation: Discharge Planning   The Plan for Transition of Care is related to the following treatment goals: skilled facilty   The Patient and/or Patient Representative was provided with a Choice of Provider? Patient   The Patient and/Or Patient Representative agree with the Discharge Plan? Yes   Freedom of Choice list was provided with basic dialogue that supports the patient's individualized plan of care/goals, treatment preferences, and shares the quality data associated with the providers?   Yes     Case Management

## 2023-08-01 NOTE — PLAN OF CARE
Problem: Discharge Planning  Goal: Discharge to home or other facility with appropriate resources  Outcome: Progressing     Problem: Chronic Conditions and Co-morbidities  Goal: Patient's chronic conditions and co-morbidity symptoms are monitored and maintained or improved  Outcome: Progressing  Flowsheets (Taken 8/1/2023 1324)  Care Plan - Patient's Chronic Conditions and Co-Morbidity Symptoms are Monitored and Maintained or Improved:   Monitor and assess patient's chronic conditions and comorbid symptoms for stability, deterioration, or improvement   Collaborate with multidisciplinary team to address chronic and comorbid conditions and prevent exacerbation or deterioration     Problem: Neurosensory - Adult  Goal: Achieves stable or improved neurological status  Outcome: Progressing     Problem: Skin/Tissue Integrity - Adult  Goal: Skin integrity remains intact  Outcome: Progressing     Problem: Skin/Tissue Integrity - Adult  Goal: Incisions, wounds, or drain sites healing without S/S of infection  Outcome: Progressing     Problem: Musculoskeletal - Adult  Goal: Return mobility to safest level of function  Outcome: Progressing     Problem: Musculoskeletal - Adult  Goal: Maintain proper alignment of affected body part  Outcome: Progressing     Problem: Musculoskeletal - Adult  Goal: Return ADL status to a safe level of function  Outcome: Progressing     Problem: Infection - Adult  Goal: Absence of infection at discharge  Outcome: Progressing     Problem: Infection - Adult  Goal: Absence of infection during hospitalization  Outcome: Progressing     Problem: Metabolic/Fluid and Electrolytes - Adult  Goal: Glucose maintained within prescribed range  Outcome: Progressing     Problem: Pain  Goal: Verbalizes/displays adequate comfort level or baseline comfort level  Outcome: Progressing     Problem: Safety - Adult  Goal: Free from fall injury  Outcome: Progressing     Problem: ABCDS Injury Assessment  Goal: Absence of

## 2023-08-01 NOTE — ANESTHESIA PRE PROCEDURE
Results   Component Value Date/Time    WBC 35.1 07/24/2023 11:40 AM    RBC 3.93 07/24/2023 11:40 AM    HGB 13.0 07/24/2023 11:40 AM    HCT 38.4 07/24/2023 11:40 AM    MCV 97.6 07/24/2023 11:40 AM    RDW 13.1 07/24/2023 11:40 AM     07/24/2023 11:40 AM     CMP    Lab Results   Component Value Date/Time     07/24/2023 11:40 AM    K 4.3 07/24/2023 11:40 AM     07/24/2023 11:40 AM    CO2 20 07/24/2023 11:40 AM    BUN 15 07/24/2023 11:40 AM    CREATININE 0.8 07/24/2023 11:40 AM    GFRAA >60 07/08/2022 12:07 PM    AGRATIO 2.5 11/07/2022 05:15 PM    LABGLOM >60 07/24/2023 11:40 AM    GLUCOSE 141 07/24/2023 11:40 AM    PROT 6.9 11/07/2022 05:15 PM    CALCIUM 10.2 07/24/2023 11:40 AM    BILITOT 0.2 11/07/2022 05:15 PM    ALKPHOS 98 11/07/2022 05:15 PM    AST 17 11/07/2022 05:15 PM    ALT 15 11/07/2022 05:15 PM     BMP    Lab Results   Component Value Date/Time     07/24/2023 11:40 AM    K 4.3 07/24/2023 11:40 AM     07/24/2023 11:40 AM    CO2 20 07/24/2023 11:40 AM    BUN 15 07/24/2023 11:40 AM    CREATININE 0.8 07/24/2023 11:40 AM    CALCIUM 10.2 07/24/2023 11:40 AM    GFRAA >60 07/08/2022 12:07 PM    LABGLOM >60 07/24/2023 11:40 AM    GLUCOSE 141 07/24/2023 11:40 AM     POCGlucose  No results for input(s): GLUCOSE in the last 72 hours.    Coags    Lab Results   Component Value Date/Time    PROTIME 13.0 07/24/2023 11:40 AM    INR 0.98 07/24/2023 11:40 AM    APTT 28.7 07/24/2023 11:40 AM     HCG (If Applicable) No results found for: PREGTESTUR, PREGSERUM, HCG, HCGQUANT   ABGs No results found for: PHART, PO2ART, ZSD8HOI, CHD9VBY, BEART, N8JEWDXF   Type & Screen (If Applicable)  No results found for: LABABO, LABRH                         BMI: Wt Readings from Last 3 Encounters:       NPO Status:   Date of last liquid consumption: 08/01/23   Time of last liquid consumption: 0030   Date of last solid food consumption: 07/30/23      Time of last solid consumption: 1600       Anesthesia

## 2023-08-01 NOTE — OP NOTE
Patient: Whitley Arambula  YOB: 1955  MRN: [id number]    Date of Procedure: 8/1/2023      Pre-Op Diagnosis: Osteoarthritis, left hip     Post-Op Diagnosis: Same       Procedure Performed: Left anterior total hip arthroplasty     Surgeon: Katie Rosario MD     Physician Assistant: HAIDER Diallo     Anesthesia: General     Estimated Blood Loss: 242 mL      Complications: None    Implants:  Depuy [place] Gription cup, 52 mm  36 mm polyethylene liner, +4 mm offset  Depuy Actis stem, size 3 high offset  36 mm diameter ceramic femoral head, +1.5 mm offset    Indications: This is a 78 y.o. female with osteoarthritis of the hip that continues to be painful despite conservative management. We discussed the diagnosis and treatment options and I recommended total hip arthroplasty. The operative procedure, alternatives, and risks were discussed in detail with the patient. The risks include but are not limited to: Infection, vessel injury, nerve injury, DVT, pulmonary embolism, implant loosening, need for revision surgery, leg length discrepancy, dislocation, lateral femoral cutaneous nerve palsy, intraoperative fracture. Informed consent for surgery was signed by the patient. Details: The patient was seen in the preoperative holding area where the site of surgery was marked and informed consent was confirmed. The patient was brought back to the operating room by OR personnel. Anesthesia was administered. The patient was positioned supine on the [place] table. The left lower extremity was then prepped and draped in a standard and sterile fashion. A final and formal timeout was then performed which confirmed the correct patient, correct position, and correct site of surgery. IV antibiotics were administered within 1 hour of the skin incision. A direct anterior supine approach was utilized. The skin and subcutaneous tissue were dissected down to the body of the tensor fascia perez.  Incision into the fascia of

## 2023-08-01 NOTE — PROGRESS NOTES
To pacu from OR. Pt asleep. Dressing to left hip dry and intact. Ice to left hip. Pedal pulses palpable. IV infusing. Monitor in sinus rhythm.

## 2023-08-01 NOTE — CARE COORDINATION
Post Acute Coordination     For purposes of admission to SNF under the [facility] ACO 3DW, the patient is an attributed [facility] beneficiary, but some eligibility criteria are still pending. Final 3DW verification outcome will be communicated once all criteria has been addressed.       Carole Gramajo MSW, LCSW, [facility]- Team

## 2023-08-02 VITALS
OXYGEN SATURATION: 96 % | BODY MASS INDEX: 27.73 KG/M2 | RESPIRATION RATE: 18 BRPM | HEART RATE: 68 BPM | SYSTOLIC BLOOD PRESSURE: 126 MMHG | TEMPERATURE: 98.3 F | WEIGHT: 156.53 LBS | DIASTOLIC BLOOD PRESSURE: 72 MMHG | HEIGHT: 63 IN

## 2023-08-02 LAB
ANION GAP SERPL CALCULATED.3IONS-SCNC: 11 MMOL/L (ref 3–16)
BUN SERPL-MCNC: 18 MG/DL (ref 7–20)
CALCIUM SERPL-MCNC: 8.7 MG/DL (ref 8.3–10.6)
CHLORIDE SERPL-SCNC: 107 MMOL/L (ref 99–110)
CO2 SERPL-SCNC: 21 MMOL/L (ref 21–32)
CREAT SERPL-MCNC: 0.7 MG/DL (ref 0.6–1.2)
GFR SERPLBLD CREATININE-BSD FMLA CKD-EPI: >60 ML/MIN/{1.73_M2}
GLUCOSE BLD-MCNC: 142 MG/DL (ref 70–99)
GLUCOSE BLD-MCNC: 72 MG/DL (ref 70–99)
GLUCOSE SERPL-MCNC: 99 MG/DL (ref 70–99)
PERFORMED ON: ABNORMAL
PERFORMED ON: NORMAL
POTASSIUM SERPL-SCNC: 4.3 MMOL/L (ref 3.5–5.1)
SODIUM SERPL-SCNC: 139 MMOL/L (ref 136–145)

## 2023-08-02 PROCEDURE — 6370000000 HC RX 637 (ALT 250 FOR IP): Performed by: NURSE PRACTITIONER

## 2023-08-02 PROCEDURE — 96360 HYDRATION IV INFUSION INIT: CPT

## 2023-08-02 PROCEDURE — 97535 SELF CARE MNGMENT TRAINING: CPT

## 2023-08-02 PROCEDURE — 96361 HYDRATE IV INFUSION ADD-ON: CPT

## 2023-08-02 PROCEDURE — G0378 HOSPITAL OBSERVATION PER HR: HCPCS

## 2023-08-02 PROCEDURE — 6360000002 HC RX W HCPCS: Performed by: NURSE PRACTITIONER

## 2023-08-02 PROCEDURE — 36415 COLL VENOUS BLD VENIPUNCTURE: CPT

## 2023-08-02 PROCEDURE — 2580000003 HC RX 258: Performed by: ORTHOPAEDIC SURGERY

## 2023-08-02 PROCEDURE — 80048 BASIC METABOLIC PNL TOTAL CA: CPT

## 2023-08-02 PROCEDURE — 6370000000 HC RX 637 (ALT 250 FOR IP): Performed by: ORTHOPAEDIC SURGERY

## 2023-08-02 PROCEDURE — 97530 THERAPEUTIC ACTIVITIES: CPT

## 2023-08-02 PROCEDURE — 2580000003 HC RX 258: Performed by: NURSE PRACTITIONER

## 2023-08-02 PROCEDURE — 6360000002 HC RX W HCPCS: Performed by: ORTHOPAEDIC SURGERY

## 2023-08-02 RX ORDER — DIPHENHYDRAMINE HCL 25 MG
25 TABLET ORAL EVERY 6 HOURS PRN
Qty: 20 TABLET | Refills: 0 | Status: SHIPPED | OUTPATIENT
Start: 2023-08-02 | End: 2023-09-01

## 2023-08-02 RX ORDER — DIPHENHYDRAMINE HCL 25 MG
25 TABLET ORAL ONCE
Status: COMPLETED | OUTPATIENT
Start: 2023-08-02 | End: 2023-08-02

## 2023-08-02 RX ORDER — CEFUROXIME AXETIL 250 MG/1
250 TABLET ORAL 2 TIMES DAILY
Qty: 14 TABLET | Refills: 0 | Status: SHIPPED | OUTPATIENT
Start: 2023-08-02 | End: 2023-08-09

## 2023-08-02 RX ORDER — DIPHENHYDRAMINE HCL 25 MG
25 TABLET ORAL EVERY 6 HOURS PRN
Status: DISCONTINUED | OUTPATIENT
Start: 2023-08-02 | End: 2023-08-02 | Stop reason: HOSPADM

## 2023-08-02 RX ADMIN — ASPIRIN 81 MG: 81 TABLET, COATED ORAL at 09:45

## 2023-08-02 RX ADMIN — MELOXICAM 7.5 MG: 7.5 TABLET ORAL at 05:41

## 2023-08-02 RX ADMIN — DIPHENHYDRAMINE HCL 25 MG: 25 TABLET ORAL at 09:45

## 2023-08-02 RX ADMIN — INSULIN LISPRO 6 UNITS: 100 INJECTION, SOLUTION INTRAVENOUS; SUBCUTANEOUS at 10:00

## 2023-08-02 RX ADMIN — OXYCODONE HYDROCHLORIDE 10 MG: 10 TABLET ORAL at 09:45

## 2023-08-02 RX ADMIN — SODIUM CHLORIDE 2000 MG: 900 INJECTION INTRAVENOUS at 00:17

## 2023-08-02 RX ADMIN — SODIUM CHLORIDE: 4.5 INJECTION, SOLUTION INTRAVENOUS at 00:15

## 2023-08-02 RX ADMIN — SODIUM CHLORIDE 2000 MG: 900 INJECTION INTRAVENOUS at 10:00

## 2023-08-02 RX ADMIN — SODIUM CHLORIDE, PRESERVATIVE FREE 10 ML: 5 INJECTION INTRAVENOUS at 10:02

## 2023-08-02 RX ADMIN — OXYCODONE HYDROCHLORIDE 5 MG: 5 TABLET ORAL at 13:03

## 2023-08-02 RX ADMIN — ACETAMINOPHEN 650 MG: 325 TABLET ORAL at 00:11

## 2023-08-02 RX ADMIN — OXYCODONE HYDROCHLORIDE 10 MG: 10 TABLET ORAL at 04:23

## 2023-08-02 RX ADMIN — ACETAMINOPHEN 650 MG: 325 TABLET ORAL at 05:39

## 2023-08-02 RX ADMIN — PREGABALIN 75 MG: 75 CAPSULE ORAL at 09:46

## 2023-08-02 RX ADMIN — ACETAMINOPHEN 650 MG: 325 TABLET ORAL at 13:04

## 2023-08-02 RX ADMIN — ATORVASTATIN CALCIUM 40 MG: 40 TABLET, FILM COATED ORAL at 09:45

## 2023-08-02 ASSESSMENT — PAIN SCALES - GENERAL
PAINLEVEL_OUTOF10: 3
PAINLEVEL_OUTOF10: 0
PAINLEVEL_OUTOF10: 7
PAINLEVEL_OUTOF10: 0
PAINLEVEL_OUTOF10: 4
PAINLEVEL_OUTOF10: 7
PAINLEVEL_OUTOF10: 4
PAINLEVEL_OUTOF10: 7
PAINLEVEL_OUTOF10: 3

## 2023-08-02 ASSESSMENT — PAIN - FUNCTIONAL ASSESSMENT
PAIN_FUNCTIONAL_ASSESSMENT: ACTIVITIES ARE NOT PREVENTED
PAIN_FUNCTIONAL_ASSESSMENT: PREVENTS OR INTERFERES SOME ACTIVE ACTIVITIES AND ADLS
PAIN_FUNCTIONAL_ASSESSMENT: ACTIVITIES ARE NOT PREVENTED
PAIN_FUNCTIONAL_ASSESSMENT: ACTIVITIES ARE NOT PREVENTED

## 2023-08-02 ASSESSMENT — PAIN DESCRIPTION - ORIENTATION
ORIENTATION: LEFT

## 2023-08-02 ASSESSMENT — PAIN DESCRIPTION - LOCATION
LOCATION: LEG
LOCATION: HIP

## 2023-08-02 ASSESSMENT — PAIN SCALES - WONG BAKER
WONGBAKER_NUMERICALRESPONSE: 0

## 2023-08-02 ASSESSMENT — PAIN DESCRIPTION - DESCRIPTORS
DESCRIPTORS: ACHING
DESCRIPTORS: ACHING
DESCRIPTORS: THROBBING
DESCRIPTORS: ACHING
DESCRIPTORS: THROBBING

## 2023-08-02 NOTE — PROGRESS NOTES
IVF d/c at this time. No complaints of nausea or vomiting. BP stable. Primary nurse made aware.     Electronically signed by Alina Shannon RN on 8/1/2023 at 8:53 PM Patient reported going to urgent care for an ear infection. After further questioning, patient stated that she has 3 days left of amoxicillin for the ear infection. Patient is due for her cosentyx injection on 10/25. Transferred call to [facility].

## 2023-08-02 NOTE — CARE COORDINATION
DISCHARGE SUMMARY     DATE OF DISCHARGE: 8/2/23    DISCHARGE DESTINATION: The [address]: No    FACILITY    Level of Care: Skilled  [facility] AT [address]  Phone: [phone]  Fax: [phone]    Report Number: [phone]    Fax Number:  [phone]  Precert Obtained: yes  [facility] SN[facility] 3 day waiver approved     Hens Completed: N/A    PASARR: yes    Notified: RN, Family, and Facility/Agency      TRANSPORTATION: Private Car    NEW DME ORDERED: no    Electronically signed by Tete Renae on 8/2/2023 at 10:56 AM  #[phone]

## 2023-08-02 NOTE — PROGRESS NOTES
Report called to [facility] to nurse Sarah paulino on discharge instructions, follow up appointment, and anticoagulant aspirin 81mg to be given twice a day for 30 days total, nurse Paul Pérez verbalized understanding. educated Paul Pérez nurse that patient should wear teds hose for 2 weeks total, on in AM and off at bedtime. no further questions at this time. Updated bedside rn Rosa Course on report being given.     Future Appointments   Date Time Provider [address] [place] [address]   8/17/2023 11:00 AM Kaleigh Rice MD [facility]   8/21/2023 10:00 AM Montana Martin MD [facility]     Electronically signed by Dereck Fraser RN on 8/2/2023 at 1:10 PM

## 2023-08-02 NOTE — CARE COORDINATION
Patient has met all required criteria and is eligible for admission to an approved SNF affiliate under the [facility] ACO 3-day rule waiver. The ACO verification process is complete. *Reminder [facility] are only eligible at approved 3DW SNF partners. Please use your markets 3DW facility patient choice list located in the [address] [place] [address] 3DW Toolkit to determine which facilities are eligible. I have included the link to the toolkit that houses all [address] [place] [address] 3DW resources.       [facility] SNF 3-Day Waiver Toolkit      Notified patient of approval for admit to [facility]  She states her son will transport her today    Benita Whitmorediann brady/ KENA # [phone]   - informed her of approval  Faxed her copy of approval to fax# [phone]    Electronically signed by Ktahleen Lyon on 8/2/2023 at 10:52 AM  #[phone]

## 2023-08-02 NOTE — PROGRESS NOTES
Occupational Therapy  Facility/Department: [address] [place] [address]  Occupational Therapy Treatment Session    Name: Tenisha Almanzar  : 1955  MRN: [id number]  Date of Service: 2023    Discharge Recommendations:  3-5 sessions per week      Tenisha Almanzar scored a 19/24 on the AM-PAC ADL Inpatient form. Current research shows that an AM-PAC score of 17 or less is typically not associated with a discharge to the patient's home setting. Based on the patient's AM-PAC score and their current ADL deficits, it is recommended that the patient have 3-5 sessions per week of Occupational Therapy at d/c to increase the patient's independence. Please see assessment section for further patient specific details. If patient discharges prior to next session this note will serve as a discharge summary. Please see below for the latest assessment towards goals. Patient Diagnosis(es): The encounter diagnosis was Primary osteoarthritis of left hip. Past Medical History:  has a past medical history of Arthritis, Cancer ([address]), Chronic back pain, Hyperlipidemia, Osteoarthritis, and Prolonged emergence from general anesthesia. Past Surgical History:  has a past surgical history that includes hernia repair; Hysterectomy; Cervix surgery; Shoulder arthroscopy (Right, 2021); and Total hip arthroplasty (Left, 2023). Treatment Diagnosis: decreased IND ADL/fxl mobility      Assessment   Performance deficits / Impairments: Decreased functional mobility ; Decreased safe awareness;Decreased balance;Decreased ADL status; Decreased cognition;Decreased endurance;Decreased high-level IADLs;Decreased strength;Decreased ROM  Assessment: 79 yo female admitted for L ROBERT. PMH: chronic back pain, OA R shoulder, CLL, opiod dependence, OA R knee. PTA: living alone and IND in all ADL/fxl mobility. Pt. impulsive throughout session with consistent cues to slow speed and for safety, some evidence of learning. Unable to follow hip precautions. wear/schedule, and importance of ice/elevation/hourly fxl mobility. Increased time for education for clarification and explantations. Education Method: Verbal;Demonstration; Teach Back  Barriers to Learning: Cognition  Education Outcome: Verbalized understanding;Continued education needed                                                                      AM-PAC Score        AM-PAC Inpatient Daily Activity Raw Score: 19 (08/02/23 1227)  AM-PAC Inpatient ADL T-Scale Score : 40.22 (08/02/23 1227)  ADL Inpatient CMS 0-100% Score: 42.8 (08/02/23 1227)  ADL Inpatient CMS G-Code Modifier : CK (08/02/23 1227)       Goals  Short Term Goals  Time Frame for Short Term Goals: prior to d/c - ongoing progressing  Short Term Goal 1: Mod I UB dressing/bathing  Short Term Goal 2: Mod I LB dressing/bathing  Short Term Goal 3: Mod I grooming  Short Term Goal 4: Mod I toileting  Short Term Goal 5: Mod I fxl tx with LRD  Patient Goals   Patient goals : return home after SNF stay       Therapy Time   Individual Concurrent Group Co-treatment   Time In 1020         Time Out 1115         Minutes 55         Timed Code Treatment Minutes: 54 Minutes (35 ADL, 20 TA)       RAMESH Cordoba    I attest that I was present for and made a skilled & mindful clinical judgement during the evaluation and/or treatment of this patient on 8/2/2023    Electronically signed by EDWARD Rivera OTR/L on 8/2/2023 at 12:32 PM

## 2023-08-02 NOTE — PROGRESS NOTES
Huddle performed this morning including Nurse navigator Estevan Chris, Physical therapist Tera Brown, and Occupational therapist Kristofer Santos. Discussed plan of care, discharge plan, and dme needs if applicable for orthopedic total joint patient.

## 2023-08-02 NOTE — CARE COORDINATION
ACO Provider Review and Signoff    For the purposes of admission to the SNF under the [facility] ACO 3-day waiver, I have reviewed the evaluation performed by the provider and agree that the beneficiary requires admission to SNF. Thank you,     Cecilia Campo M.D., M.P.H  [facility] Provider   [facility]   Phone: [phone]  Fax: [phone]     --Danae Chin MD on 8/2/2023 at 10:09 AM    An electronic signature was used to authenticate this note.

## 2023-08-02 NOTE — CARE COORDINATION
Post Acute Coordination      10:12a-  Patient has met all required criteria and is eligible for admission to an approved SNF affiliate under the [facility] ACO 3-day rule waiver. The ACO verification process is complete and case management has been notified of verification outcome. For purposes of admission to SNF under the [facility] ACO 3DW, the patient is an attributed [facility] beneficiary and the chart has been routed for [facility] physician signoff. Final 3DW verification outcome status will be communicated once the [facility] physician review has been completed.       Francheska Lane, MSW, LCSW, LISW  PAC-Team

## 2023-08-02 NOTE — PROGRESS NOTES
Patient lying on bed, A&O x 4 with episodes of pain complaints on her left hip with due PRN medications given. Vital signs were stable. Call light within reach, able to make needs knows, fall precautions in place. Will continue to monitor.  Electronically signed by Dariana Bradley RN on 8/2/2023 at 5:05 AM

## 2023-08-02 NOTE — ACP (ADVANCE CARE PLANNING)
Advance Care Planning     Advance Care Planning Inpatient Note  [facility] Department    Today's Date: 8/2/2023  Unit: [facility] 3W ORTHOPEDICS    Received request from HealthCare Provider. Upon review of chart and communication with care team, patient's decision making abilities are not in question. . Patient was/were present in the room during visit. Goals of ACP Conversation:  Discuss advance care planning documents    Health Care Decision Makers:       Primary Decision Maker: Alec Lavell Child - [phone]    Secondary Decision Maker: Ana Bae - Child - [phone]    Secondary Decision Maker: Kalli Slade Child - [phone]  Summary:  Completed [address]    Advance Care Planning Documents (Patient Wishes):  Healthcare Power of /Advance Directive Appointment of Health Care Agent  Living Will/Advance Directive     Assessment:  Completed HCPOA and LW docs per pt's wishes.      Interventions:  Provided education on documents for clarity and greater understanding  Discussed and provided education on state decision maker hierarchy  Assisted in the completion of documents according to patient's wishes at this time    Care Preferences Communicated:   No    Outcomes/Plan:  ACP Discussion: Completed  Returned original document(s) to patient, as well as copies for distribution to appointed agents  Copy placed in pt's chart    Electronically signed by José Luis Carrasquillo on 8/2/2023 at 9:23 AM

## 2023-08-02 NOTE — PLAN OF CARE
Problem: Discharge Planning  Goal: Discharge to home or other facility with appropriate resources  8/2/2023 1239 by Reid Broussard RN  Outcome: Completed  Flowsheets (Taken 8/2/2023 1231)  Discharge to home or other facility with appropriate resources: Identify barriers to discharge with patient and caregiver  8/2/2023 0040 by Anam Valenzuela RN  Outcome: Progressing     Problem: Chronic Conditions and Co-morbidities  Goal: Patient's chronic conditions and co-morbidity symptoms are monitored and maintained or improved  8/2/2023 1239 by Reid Broussard RN  Outcome: Completed  Flowsheets (Taken 8/2/2023 1231)  Care Plan - Patient's Chronic Conditions and Co-Morbidity Symptoms are Monitored and Maintained or Improved: Monitor and assess patient's chronic conditions and comorbid symptoms for stability, deterioration, or improvement  8/2/2023 0040 by Anam Valenzuela RN  Outcome: Progressing     Problem: Neurosensory - Adult  Goal: Achieves stable or improved neurological status  8/2/2023 1239 by Reid Broussard RN  Outcome: Completed  Flowsheets (Taken 8/2/2023 1231)  Achieves stable or improved neurological status: Assess for and report changes in neurological status  8/2/2023 0040 by Anam Valenzuela RN  Outcome: Progressing     Problem: Skin/Tissue Integrity - Adult  Goal: Skin integrity remains intact  8/2/2023 1239 by Reid Broussard RN  Outcome: Completed  Flowsheets  Taken 8/2/2023 1237  Skin Integrity Remains Intact: Monitor for areas of redness and/or skin breakdown  Taken 8/2/2023 1231  Skin Integrity Remains Intact: Monitor for areas of redness and/or skin breakdown  8/2/2023 0040 by Anam Valenzuela RN  Outcome: Progressing  Goal: Incisions, wounds, or drain sites healing without S/S of infection  8/2/2023 1239 by Reid Broussard RN  Outcome: Completed  Flowsheets  Taken 8/2/2023 1237  Incisions, Wounds, or Drain Sites Healing Without Sign and Symptoms of Infection: ADMISSION and DAILY: Assess and Problem: Metabolic/Fluid and Electrolytes - Adult  Goal: Glucose maintained within prescribed range  8/2/2023 1239 by Oniel Pearce RN  Outcome: Completed  Flowsheets (Taken 8/2/2023 1231)  Glucose maintained within prescribed range: Monitor blood glucose as ordered  8/2/2023 0040 by Michelle Morfin RN  Outcome: Progressing     Problem: Pain  Goal: Verbalizes/displays adequate comfort level or baseline comfort level  8/2/2023 1239 by Oniel Pearce RN  Outcome: Completed  8/2/2023 0040 by Michelle Morfin RN  Outcome: Progressing     Problem: Safety - Adult  Goal: Free from fall injury  8/2/2023 1239 by Oniel Pearce RN  Outcome: Completed  Flowsheets (Taken 8/2/2023 1237)  Free From Fall Injury: Instruct family/caregiver on patient safety  8/2/2023 0040 by Michelle Morfin RN  Outcome: Progressing     Problem: ABCDS Injury Assessment  Goal: Absence of physical injury  8/2/2023 1239 by Oniel Pearce RN  Outcome: Completed  Flowsheets (Taken 8/2/2023 1237)  Absence of Physical Injury: Implement safety measures based on patient assessment  8/2/2023 0040 by Michelle Morfin RN  Outcome: Progressing     Problem: Skin/Tissue Integrity  Goal: Absence of new skin breakdown  Description: 1. Monitor for areas of redness and/or skin breakdown  2. Assess vascular access sites hourly  3. Every 4-6 hours minimum:  Change oxygen saturation probe site  4. Every 4-6 hours:  If on nasal continuous positive airway pressure, respiratory therapy assess nares and determine need for appliance change or resting period.   Outcome: Completed

## 2023-08-02 NOTE — PROGRESS NOTES
Clinical Pharmacy Note  Medication Counseling    Reviewed new medications started during hospital admission: aspirin, cefuroxime, diphenhydramine, meloxicam, oxycodone, and pregabalin. Indications and side effects were emphasized during counseling. All medication-related questions addressed. Patient verbalized understanding of education. Should the patient express any additional questions or concerns regarding their medications, please do not hesitate to contact the pharmacy department.

## 2023-08-02 NOTE — PROGRESS NOTES
Patient's dressing on left hip was removed, slight swelling, redness and ecchymosis noted on surroundings of incision. Transparent dressing is visible, dry and intact. No drainage noted. Pain score on the site was 3/10 per Pt. Applied cold compress. No other noted complaints from Pt. Will continue monitor Pt.  Electronically signed by Aloysius Simmonds, RN on 8/2/2023 at 6:01 AM

## 2023-08-02 NOTE — PLAN OF CARE
from fall injury  8/2/2023 0040 by Paty Moreno RN  Outcome: Progressing     Problem: ABCDS Injury Assessment  Goal: Absence of physical injury  8/2/2023 0040 by Paty Moreno RN  Outcome: Progressing

## 2023-08-02 NOTE — PROGRESS NOTES
Data- Discharge order received, patient verbalized agreement to discharge, disposition to [address] . Action- discharge instructions prepared and given to patient, patient verbalized understanding. Medication information packet given related to NEW and/or CHANGED prescriptions emphasizing name/purpose/side effects, patient verbalized understanding. Discharge instruction summary: Diet- General, Activity- Full weight bearing, Primary Care Physician as follows: Brendan Kelly -[phone]. Follow up appointment with orthopedic office noted below, immunizations reviewed and discussed with patient, Prescriptions signed and in transfer packet. Inpatient surgical procedure precautions reviewed: Anterior Hip Precautions. Educated patient on using incentive spirometer post-discharge per surgeon's instructions. Neurovascular checks performed and moderate dorsi and plantar flexions noted bilaterally, toes appear appropriate to ethnicity in color, Warm to touch, patient denies numbness or tingling in extremities. Left Hip Incision site prineo glue dressing assessed and is clean,dry, and intact, no signs of redness, drainage, or odor noted. patient's bedside RN Karen Alaniz notified of patient completing discharge instructions. Nurse Navigator and Orthopedic Office contact information on discharge instructions and provided to patient. Response- Patient belongings gathered. Prescriptions signed and in transfer packet. Disposition is [address] . Patient to be transported by son and transfer packet provided to patient and instructed patient to hand to nurse at facility.     Future Appointments   Date Time Provider [address] [place] [address]   8/17/2023 11:00 AM Stalin Alcantar MD [facility]   8/21/2023 10:00 AM MD ELIAS Humphrey MED [facility]     Electronically signed by Etienne Singer RN on 8/2/2023 at 1:03 PM

## 2023-08-03 ENCOUNTER — CARE COORDINATION (OUTPATIENT)
Dept: CARE COORDINATION | Age: 68
End: 2023-08-03

## 2023-08-03 NOTE — CARE COORDINATION
Pos  Next IDT Planned Review: 8/8/23           Next IDT Planned Review: 8/8/2023    Future Appointments   Date Time Provider [address]   8/17/2023 11:00 AM Stalin Alcantar MD [facility]   8/21/2023 10:00 AM Brendan Kelly MD [facility]       SN Notes:   Diet: - Oral Diet:  General  Wounds: none  Medications:  Other: med rec complete   Other:    Post-acute CC Notes: [facility] access for admission     Rosevelt Carrel, RN

## 2023-08-08 ENCOUNTER — CARE COORDINATION (OUTPATIENT)
Dept: CARE COORDINATION | Age: 68
End: 2023-08-08

## 2023-08-08 NOTE — CARE COORDINATION
Planning   Goals of Care: return home   Palliative/Hospice Referral indicated: Neg   Anticipated date for discharge: 8/12/23   Discharge Planning / Barriers: LCD 8/11, DC 8/12. Plan is home with outpatient therapy which has been ordered. Pt has walk in shower at home   Care Progression on Track?: Pos           Next IDT Planned Review: n/a     Future Appointments   Date Time Provider [address]   8/10/2023  3:30 PM Franklin Acosta MD [facility]   8/21/2023 10:00 AM Lora Espinoza MD [facility]       SN Notes:   Diet: - Oral Diet:  General  Wounds: none  Medications:  Other: facility  Other:    Post-acute CC Notes: IDT call and Farzana Glass RN

## 2023-08-10 ENCOUNTER — OFFICE VISIT (OUTPATIENT)
Dept: ORTHOPEDIC SURGERY | Age: 68
End: 2023-08-10

## 2023-08-10 VITALS — WEIGHT: 160 LBS | BODY MASS INDEX: 28.35 KG/M2 | HEIGHT: 63 IN

## 2023-08-10 DIAGNOSIS — Z96.642 STATUS POST TOTAL REPLACEMENT OF LEFT HIP: Primary | ICD-10-CM

## 2023-08-10 PROCEDURE — 99024 POSTOP FOLLOW-UP VISIT: CPT | Performed by: ORTHOPAEDIC SURGERY

## 2023-08-10 NOTE — PROGRESS NOTES
[address] [facility]  Office Visit    Chief Complaint: Follow-up s/p left total hip arthroplasty    HPI:  Tanvir Casillas is a 78 y.o. who is here in follow-up of left total hip arthroplasty performed on August 1, 2023. She is currently staying at a skilled nursing facility. She is walking with use of walker. She is occasionally taking narcotic pain medication. She has occasional burning pain on the lateral side of the thigh. She reports that she is doing well overall and is going home in 2 days. 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    CLL (chronic lymphocytic leukemia) ([facility])    Opioid dependence with current use ([facility])    Primary osteoarthritis of left hip       ROS:  Constitutional: denies fever, chills, weight loss  MSK: denies pain in other joints, muscle aches  Neurological: denies numbness, tingling, weakness    Exam:  Appearance: sitting in exam room chair, appears to be in no acute distress, awake and alert  Resp: unlabored breathing on room air  Skin: warm, dry and intact with out erythema or significant increased temperature  Neuro: grossly intact both lower extremities. Intact sensation to light touch. Motor exam 4+ to 5/5 in all major motor groups. Left hip: Incision is healing as expected. Examination demonstrates negative logroll and negative Stinchfield. There is brisk capillary refill. Strength is 5/5 in hamstrings, quads, hip flexors. Imaging:  3 views of the left hip were performed and reviewed today. Significant for total hip arthroplasty prosthesis in place with no signs of osteolysis, loosening, fracture, or dislocation. Assessment:  S/p left total hip arthroplasty    Plan:  She is recovering well postoperatively.   She will transition outpatient physical

## 2023-08-11 NOTE — DISCHARGE SUMMARY
Physician Discharge Summary     Patient ID:  Whitley Arambula  [id number]  77 y.o.  1955    Admit date: 8/1/2023    Discharge date and time: 8/2/2023  1:22 PM     Admitting Physician: Katie Rosario MD     Discharge Physician: Tressa Antonio    Admission Diagnoses: Arthritis of left hip [M16.12]  Primary osteoarthritis of left hip [M16.12]    Discharge Diagnoses: left hip OA    Admission Condition: good    Discharged Condition: good    Indication for Admission: Failed conservative treatment as outpatient for joint pain including PT and pain meds. This patient was then electively scheduled for total joint replacement surgery    Surgical procedure: left ROBERT    Consults: PT OT SS    This patient had no postoperative complications. They has PT and OT for ADL's . IV and PO pain med for pain control and was eventually DC in stable condition    Treatments: analgesia,  therapies: PT OT,  and surgery      Disposition: home    Patient Instructions:   [unfilled]  Activity: activity as tolerated  Diet: regular diet  Wound Care: keep wound clean and dry    Follow-up with HANNAH Blount in 2 weeks.     Signed:  ADWOA Leigh CNP  8/11/2023  12:34 PM

## 2023-08-14 ENCOUNTER — CARE COORDINATION (OUTPATIENT)
Dept: CARE COORDINATION | Age: 68
End: 2023-08-14

## 2023-08-14 ENCOUNTER — TELEPHONE (OUTPATIENT)
Dept: ORTHOPEDIC SURGERY | Age: 68
End: 2023-08-14

## 2023-08-14 NOTE — CARE COORDINATION
[address] Update Call    2023    Patient: Franklin Herrera Patient : 1955   MRN: <[id number]>  Reason for Admission: L anterior ROBERT Gaye Monique)    Discharge Date: 23 RARS: No data recorded     First attempt at [facility] call to pt. Pt phone contact- VM not set up- unable to leave message.  Attempted pt daughter Handy Mendez (okay HIPAA)- call kept ringing- unable to leave message  Will attempt again tomorrow     Care Transitions Post Acute Facility Update    Care Transitions Interventions      Post Acute Facility Update   Post Acute Facility: [facility]    ELOS: 9 days      Nursing       Rehab/Functional       SW/Discharge Planning

## 2023-08-14 NOTE — TELEPHONE ENCOUNTER
General Question     Subject: AFTER SX QUESTIONS  Patient and /or Facility Request: Stephanie London \"Lisa\"  Contact Number: [phone]    PT CALLING IN REGARDING NEEDING AN ORDER FOR OUTPATIENT THERAPY SENT TO THE [facility]. PT ALSO IS ASKING HOW LONG DOES SHE HAVE TO WEAR COMPRESSION SOCKS. PLEASE CALL BACK PT AT THE ABOVE NUMBER LISTED.

## 2023-08-15 ENCOUNTER — CARE COORDINATION (OUTPATIENT)
Dept: CARE COORDINATION | Age: 68
End: 2023-08-15

## 2023-08-15 NOTE — CARE COORDINATION
[address] Update Call    8/15/2023    Patient: Iraida Mcarthur Patient : 1955   MRN: [id number]  Reason for Admission: L anterior ROBERT Daja Sharp)     Discharge Date: 23 RARS: No data recorded     Second and final attempt at final [facility] outreach at home- VM not set up- unable to leave message. Enrollment ended.    Pt is followed by ortho nurse navigator, Sydnee Laura    Needs to be reviewed by the provider   MIKHAIL from [facility]     Method of communication with provider : chart routing             Care Transitions Post Acute Facility Update    Care Transitions Interventions      Post Acute Facility Update   Post Acute Facility: [facility]    ELOS: 9 days      Nursing       Rehab/Functional       SW/Discharge Planning

## 2023-08-16 ENCOUNTER — PATIENT MESSAGE (OUTPATIENT)
Dept: ORTHOPEDIC SURGERY | Age: 68
End: 2023-08-16

## 2023-08-16 DIAGNOSIS — M16.12 PRIMARY OSTEOARTHRITIS OF LEFT HIP: ICD-10-CM

## 2023-08-16 RX ORDER — PREGABALIN 75 MG/1
75 CAPSULE ORAL 2 TIMES DAILY
Qty: 28 CAPSULE | Refills: 0 | Status: SHIPPED | OUTPATIENT
Start: 2023-08-16 | End: 2023-08-30

## 2023-08-16 NOTE — TELEPHONE ENCOUNTER
From: Karlos Krause  To: Dr. Tenorio Holes: 8/16/2023 9:30 AM EDT  Subject: pregabalin 75 MG capsule Commonly known as: DONOVAN Kemp I get a refil for pregabalin 75 MG capsule  Commonly known as: Martha Aguirre. I feel burning and like electric shocks in my thigh.      Thank you ~  Nell Novak  1955

## 2023-08-17 ENCOUNTER — HOSPITAL ENCOUNTER (OUTPATIENT)
Dept: PHYSICAL THERAPY | Age: 68
Setting detail: THERAPIES SERIES
Discharge: HOME | End: 2023-08-17
Payer: MEDICARE

## 2023-08-17 DIAGNOSIS — M25.652 HIP STIFFNESS, LEFT: ICD-10-CM

## 2023-08-17 DIAGNOSIS — R29.898 WEAKNESS OF LEFT HIP: ICD-10-CM

## 2023-08-17 DIAGNOSIS — Z78.9 DECREASED ACTIVITIES OF DAILY LIVING (ADL): ICD-10-CM

## 2023-08-17 DIAGNOSIS — M25.552 LEFT HIP PAIN: Primary | ICD-10-CM

## 2023-08-17 PROCEDURE — 97161 PT EVAL LOW COMPLEX 20 MIN: CPT | Performed by: PHYSICAL THERAPIST

## 2023-08-17 PROCEDURE — 97530 THERAPEUTIC ACTIVITIES: CPT | Performed by: PHYSICAL THERAPIST

## 2023-08-17 PROCEDURE — 97110 THERAPEUTIC EXERCISES: CPT | Performed by: PHYSICAL THERAPIST

## 2023-08-17 NOTE — FLOWSHEET NOTE
[facility] and Therapy, [address] [facility]. [facility], [address] [facility]  Phone: [phone]   Fax: [phone]                                                       Physical Therapy Daily Treatment Note  Date:  2023     Patient Name:  Matty Dhillon    :  1955  MRN: [id number]    Medical Diagnosis:  Status post total replacement of left hip [Z96.642] anterior on 23  Treatment Diagnosis:    ICD-10-CM    1. Left hip pain  M25.552       2. Weakness of left hip  R29.898       3. Hip stiffness, left  M25.652       4. Decreased activities of daily living (ADL)  Z78.9         Insurance/Certification information:  PT Insurance Information: Medicare and [address] [place] [address] Ave   Referring Provider:  Chantelle Rothman MD   Has the plan of care been signed (Y/N):        []  Yes  [x]  No     Date of Patient follow up with Physician: 23      Is this a Progress Report:     []  Yes  [x]  No        If Yes:  Date Range for reporting period:  Beginning- 2023  Ending    Progress report will be due (10 Rx or 30 days whichever is less): 10 visits or 36       Recertification will be due (POC Duration  / 90 days whichever is less): as above        Visit # Insurance Allowable Auth Required   1 Medicare []  Yes [x]  No        Functional Scale: WOMAC- 52%    Date assessed:  2023     Latex Allergy:  [x]NO      []YES  Preferred Language for Healthcare:   [x]English       []other:    Pain level:  1-2/10  on 2023    SUBJECTIVE:  See eval    OBJECTIVE: See eval  Observation:   Test measurements:    Joint mobility: n/t              []Normal                       []Hypo              []Hyper     Palpation: n/t     Functional Mobility/Transfers: Mod I with walker     Posture: WNL     Bandages/Dressings/Incisions: Incisions are clean, dry, and intact without signs of infection.  Negative Homans with non-tender calf and no

## 2023-08-21 DIAGNOSIS — M16.12 PRIMARY OSTEOARTHRITIS OF LEFT HIP: ICD-10-CM

## 2023-08-21 RX ORDER — OXYCODONE HYDROCHLORIDE 5 MG/1
5-10 TABLET ORAL EVERY 6 HOURS PRN
Qty: 40 TABLET | Refills: 0 | Status: SHIPPED | OUTPATIENT
Start: 2023-08-21 | End: 2023-08-26

## 2023-08-21 NOTE — TELEPHONE ENCOUNTER
Prescription Refill     Medication Name:  [place]: Nuvia Joeliz [address] [facility]. [facility] [phone]  Patient Contact Number:  [phone]

## 2023-08-22 ENCOUNTER — HOSPITAL ENCOUNTER (OUTPATIENT)
Dept: PHYSICAL THERAPY | Age: 68
Setting detail: THERAPIES SERIES
Discharge: HOME | End: 2023-08-22
Payer: MEDICARE

## 2023-08-22 PROCEDURE — 97530 THERAPEUTIC ACTIVITIES: CPT | Performed by: PHYSICAL THERAPIST

## 2023-08-22 PROCEDURE — 97112 NEUROMUSCULAR REEDUCATION: CPT | Performed by: PHYSICAL THERAPIST

## 2023-08-22 PROCEDURE — 97110 THERAPEUTIC EXERCISES: CPT | Performed by: PHYSICAL THERAPIST

## 2023-08-22 NOTE — FLOWSHEET NOTE
[address] [facility] [address] and Therapy, [address] [facility]. Laura Cruz, [address]  Phone: [phone]   Fax: [phone]                                                       Physical Therapy Daily Treatment Note  Date:  2023     Patient Name:  Vicki Alvarez    :  1955  MRN: [id number]    Medical Diagnosis:  Status post total replacement of left hip [Z96.642] anterior on 23  Treatment Diagnosis:    ICD-10-CM    1. Left hip pain  M25.552       2. Weakness of left hip  R29.898       3. Hip stiffness, left  M25.652       4. Decreased activities of daily living (ADL)  Z78.9         Insurance/Certification information:  PT Insurance Information: Medicare and [address] [place] [address] Ave   Referring Provider:  Bartolome Swift MD   Has the plan of care been signed (Y/N):        [x]  Yes  []  No     Date of Patient follow up with Physician: 23      Is this a Progress Report:     []  Yes  [x]  No        If Yes:  Date Range for reporting period:  Beginning- 2023  Ending    Progress report will be due (10 Rx or 30 days whichever is less): 10 visits or        Recertification will be due (POC Duration  / 90 days whichever is less): as above        Visit # Insurance Allowable Auth Required   2 Medicare []  Yes [x]  No        Functional Scale: WOMAC- 52%    Date assessed:  2023     Latex Allergy:  [x]NO      []YES  Preferred Language for Healthcare:   [x]English       []other:    Pain level:  1-2/10  on 2023    SUBJECTIVE:  pt reports she was really hurting all weekend justa Saturday since going to cane too quickly leaving the rehab unit. She did take one Roxicodone before coming into PT. Only taking these as needed. She does notice with medication she can stand taller and take longer strides. Feels like this medication she can walk better without limping.    She does have pins and needles and zinging pain lateral thigh that

## 2023-08-24 ENCOUNTER — HOSPITAL ENCOUNTER (OUTPATIENT)
Dept: PHYSICAL THERAPY | Age: 68
Setting detail: THERAPIES SERIES
Discharge: HOME | End: 2023-08-24
Payer: MEDICARE

## 2023-08-24 PROCEDURE — 97112 NEUROMUSCULAR REEDUCATION: CPT | Performed by: PHYSICAL THERAPIST

## 2023-08-24 PROCEDURE — 97110 THERAPEUTIC EXERCISES: CPT | Performed by: PHYSICAL THERAPIST

## 2023-08-24 PROCEDURE — 97530 THERAPEUTIC ACTIVITIES: CPT | Performed by: PHYSICAL THERAPIST

## 2023-08-24 NOTE — FLOWSHEET NOTE
reducing/eliminating soft tissue swelling/inflammation/restriction, improving soft tissue extensibility and allowing for proper ROM for normal function with self care, mobility, lifting and ambulation. Modalities:  declined ice   [] GAME READY (VASO)- for significant edema, swelling, pain control. Charges:  Timed Code Treatment Minutes: 46   Total Treatment Minutes: 48     [] EVAL (LOW) 71588   [] EVAL (MOD) 52909   [] EVAL (HIGH) 74547   [] RE-EVAL   [x] OT(20765) x  1   [] IONTO  [x] NMR (09269) x  1   [] VASO  [] Manual (67129) x      [] Other:  [x] TA x1      [] Mech Traction (99215)  [] ES(attended) (96872)      [] ES (un) (68240):       GOALS: Patient stated goal: get back to work and hiking    [] Progressing: [] Met: [] Not Met: [] Adjusted     Therapist goals for Patient:   Short Term Goals: To be achieved in: 2 weeks  1. Independent in HEP and progression per patient tolerance, in order to prevent re-injury. [] Progressing: [] Met: [] Not Met: [] Adjusted   2. Patient will have a decrease in pain to facilitate improvement in movement, function, and ADLs as indicated by Functional Deficits. [] Progressing: [] Met: [] Not Met: [] Adjusted     Long Term Goals: To be achieved in: 8-12 weeks  1. Functional index score of 30% or less for WOMAC to assist with reaching prior level of function. [] Progressing: [] Met: [] Not Met: [] Adjusted  2. Patient will demonstrate increased L hip AROM to 100 flex to allow for proper joint functioning as indicated by patients Functional Deficits. [] Progressing: [] Met: [] Not Met: [] Adjusted  3. Patient will demonstrate an increase in L knee and hip abd strength to within 5 lbs HHD of opposite LE to allow for proper functional mobility as indicated by patients Functional Deficits. [] Progressing: [] Met: [] Not Met: [] Adjusted  4.  Patient will return to full community ambulation including going up and down steps without AD functional activities without

## 2023-08-29 ENCOUNTER — HOSPITAL ENCOUNTER (OUTPATIENT)
Dept: PHYSICAL THERAPY | Age: 68
Setting detail: THERAPIES SERIES
Discharge: HOME | End: 2023-08-29
Payer: MEDICARE

## 2023-08-29 PROCEDURE — 97530 THERAPEUTIC ACTIVITIES: CPT | Performed by: PHYSICAL THERAPIST

## 2023-08-29 PROCEDURE — 97110 THERAPEUTIC EXERCISES: CPT | Performed by: PHYSICAL THERAPIST

## 2023-08-29 PROCEDURE — 97112 NEUROMUSCULAR REEDUCATION: CPT | Performed by: PHYSICAL THERAPIST

## 2023-08-29 NOTE — FLOWSHEET NOTE
visit [] Discharge    Note: If patient does not return for scheduled/ recommended follow up visits, this note will serve as a discharge from care along with most recent update on progress.      Electronically signed by: Maxine Wilhelm PT, DPT

## 2023-08-31 ENCOUNTER — HOSPITAL ENCOUNTER (OUTPATIENT)
Dept: PHYSICAL THERAPY | Age: 68
Setting detail: THERAPIES SERIES
Discharge: HOME | End: 2023-08-31
Payer: MEDICARE

## 2023-08-31 PROCEDURE — 97112 NEUROMUSCULAR REEDUCATION: CPT | Performed by: PHYSICAL THERAPIST

## 2023-08-31 PROCEDURE — 97530 THERAPEUTIC ACTIVITIES: CPT | Performed by: PHYSICAL THERAPIST

## 2023-08-31 PROCEDURE — 97110 THERAPEUTIC EXERCISES: CPT | Performed by: PHYSICAL THERAPIST

## 2023-08-31 NOTE — FLOWSHEET NOTE
[address] [facility] [address] and Therapy, [address] [place], [address]  Phone: [phone]   Fax: [phone]                                                       Physical Therapy Daily Treatment Note  Date:  2023     Patient Name:  Frankie Wang    :  1955  MRN: [id number]    Medical Diagnosis:  Status post total replacement of left hip [Z96.642] anterior on 23  Treatment Diagnosis:    ICD-10-CM    1. Left hip pain  M25.552       2. Weakness of left hip  R29.898       3. Hip stiffness, left  M25.652       4. Decreased activities of daily living (ADL)  Z78.9         Insurance/Certification information:  PT Insurance Information: Medicare and [address] [place] [address] Ave   Referring Provider:  Lauro Dumont MD   Has the plan of care been signed (Y/N):        [x]  Yes  []  No     Date of Patient follow up with Physician: 23      Is this a Progress Report:     []  Yes  [x]  No        If Yes:  Date Range for reporting period:  Beginning- 2023  Ending    Progress report will be due (10 Rx or 30 days whichever is less): 10 visits or 3/37/48       Recertification will be due (POC Duration  / 90 days whichever is less): as above        Visit # Insurance Allowable Auth Required   5 Medicare []  Yes [x]  No        Functional Scale: WOMAC- 52%    Date assessed:  2023     Latex Allergy:  [x]NO      []YES  Preferred Language for Healthcare:   [x]English       []other:    Pain level:  0/10 at rest; 3-4/10 WBing  on 2023    SUBJECTIVE:   pt felt fine and not as sore after last session. She has been sore in her distal quad though and numb still lateral thigh. She does report pain in groin and around to glute if she fully extends her L knee when walking due to her LLD. OBJECTIVE:   Observation: : L PSIS and ASIS and iliac crest higher than R standing and L LE longer supine and long sitting.    Test measurements:

## 2023-09-05 ENCOUNTER — HOSPITAL ENCOUNTER (OUTPATIENT)
Dept: PHYSICAL THERAPY | Age: 68
Setting detail: THERAPIES SERIES
Discharge: HOME | End: 2023-09-05
Payer: MEDICARE

## 2023-09-05 PROCEDURE — 97112 NEUROMUSCULAR REEDUCATION: CPT | Performed by: PHYSICAL THERAPIST

## 2023-09-05 PROCEDURE — 97530 THERAPEUTIC ACTIVITIES: CPT | Performed by: PHYSICAL THERAPIST

## 2023-09-05 PROCEDURE — 97110 THERAPEUTIC EXERCISES: CPT | Performed by: PHYSICAL THERAPIST

## 2023-09-05 NOTE — FLOWSHEET NOTE
[address] [employer] and Therapy, [address] [place], [address]  Phone: [phone]   Fax: [phone]                                                       Physical Therapy Daily Treatment Note  Date:  2023     Patient Name:  Frankie Wang    :  1955  MRN: [id number]    Medical Diagnosis:  Status post total replacement of left hip [Z96.642] anterior on 23  Treatment Diagnosis:    ICD-10-CM    1. Left hip pain  M25.552       2. Weakness of left hip  R29.898       3. Hip stiffness, left  M25.652       4. Decreased activities of daily living (ADL)  Z78.9         Insurance/Certification information:  PT Insurance Information: Medicare and [address] [place] [address] Ave   Referring Provider:  Lauro Dumont MD   Has the plan of care been signed (Y/N):        [x]  Yes  []  No     Date of Patient follow up with Physician: 23      Is this a Progress Report:     []  Yes  [x]  No        If Yes:  Date Range for reporting period:  Beginning- 2023  Ending    Progress report will be due (10 Rx or 30 days whichever is less): 10 visits or        Recertification will be due (POC Duration  / 90 days whichever is less): as above        Visit # Insurance Allowable Auth Required   6 Medicare []  Yes [x]  No        Functional Scale: WOMAC- 52%    Date assessed:  2023     Latex Allergy:  [x]NO      []YES  Preferred Language for Healthcare:   [x]English       []other:    Pain level:  0/10 at rest; 3-4/10 WBing  on 2023    SUBJECTIVE:   Pt reports she has not used an AD since last visit. At end of night her lateral L thigh burns and feels like deep to bone. She is still taking Lyrica and anti-inflammatory for her arthritis everywhere. OBJECTIVE:   Observation: : L PSIS and ASIS and iliac crest higher than R standing and L LE longer supine and long sitting.    Test measurements:  eval:  Joint mobility: n/t

## 2023-09-06 ENCOUNTER — OFFICE VISIT (OUTPATIENT)
Dept: ORTHOPEDIC SURGERY | Age: 68
End: 2023-09-06

## 2023-09-06 VITALS — WEIGHT: 153 LBS | HEIGHT: 63 IN | BODY MASS INDEX: 27.11 KG/M2

## 2023-09-06 DIAGNOSIS — M76.821 POSTERIOR TIBIAL TENDON DYSFUNCTION (PTTD) OF RIGHT LOWER EXTREMITY: ICD-10-CM

## 2023-09-06 DIAGNOSIS — M79.671 RIGHT FOOT PAIN: ICD-10-CM

## 2023-09-06 DIAGNOSIS — M25.571 RIGHT ANKLE PAIN, UNSPECIFIED CHRONICITY: Primary | ICD-10-CM

## 2023-09-06 DIAGNOSIS — M21.41 ACQUIRED PES PLANUS, RIGHT: ICD-10-CM

## 2023-09-06 RX ORDER — MELOXICAM 7.5 MG/1
7.5 TABLET ORAL DAILY
Qty: 30 TABLET | Refills: 0 | Status: SHIPPED | OUTPATIENT
Start: 2023-09-06 | End: 2023-10-06

## 2023-09-07 ENCOUNTER — HOSPITAL ENCOUNTER (OUTPATIENT)
Dept: PHYSICAL THERAPY | Age: 68
Setting detail: THERAPIES SERIES
Discharge: HOME | End: 2023-09-07
Payer: MEDICARE

## 2023-09-07 PROCEDURE — 97110 THERAPEUTIC EXERCISES: CPT | Performed by: PHYSICAL THERAPIST

## 2023-09-07 PROCEDURE — 97112 NEUROMUSCULAR REEDUCATION: CPT | Performed by: PHYSICAL THERAPIST

## 2023-09-07 PROCEDURE — 97530 THERAPEUTIC ACTIVITIES: CPT | Performed by: PHYSICAL THERAPIST

## 2023-09-07 NOTE — PLAN OF CARE
[]Hypo              []Hyper     Palpation: n/t     Functional Mobility/Transfers: Indep     Posture: L PSIS and ASIS and iliac crest higher than R standing and L LE longer supine and long sitting. Bandages/Dressings/Incisions:pt reports no concerns about incision, healing well with no signs of infection. Mild-mod tightness superior incision     Gait: (include devices/WB status)  Pt ambulating without AD with decreased L knee extn likely due to LLD   able to go up and down steps step over pattern without handrail in session  Single leg stance: R:     L: 30 secs     Squats:x5 with mild shift to R which may be due to LLD  30 sec sit to stand:15       Quad recruitment: fair           Flexibility L R Comment   Hamstring good   90/90 position   Gastroc good       ITB         Quad  good, 0 in good  Heel to buttock distance                             ROM-9/7 PROM AROM Overpressure Comment     L R L R L R     Flexion- hip     130           Extension      WNL WNL           hip abduction  45                                       Strength- 9/7 L R Comment   Quad  39  36.2     Hamstring  35.1  42.3     Gastroc 4       Hip flexor 4  4     Hip ABD  16.1  17     Hip extension  4+ 4+                   Orthopedic Special Tests:  Special Test Results/Comment   Meniscal Click     Crepitus     Flexion Test     Valgus Laxity     Varus Laxity     Lachmans     Drop Back     Homans -                 RESTRICTIONS/PRECAUTIONS: R shoulder RCR with collagen patch 11/2021; L5-S1 fusion and other herniated discs through spine. Cancer- CLL-  Chronic lymphocytic leukemia -blood being monitored every 6 months. Appt to see Dr. Vila Life August 31st for her R ankle for ankle pain and swelling with most pain medial foot.      Exercises/Interventions:     Exercise/Equipment Resistance Repetitions Other comments   bike      Nu step 5', L 5 resistance     Stretching      Hamstring 3 x 30\"     Hip Flexor      ITB- Rope      Groin      Quad

## 2023-09-11 ENCOUNTER — OFFICE VISIT (OUTPATIENT)
Dept: ORTHOPEDIC SURGERY | Age: 68
End: 2023-09-11

## 2023-09-11 VITALS — WEIGHT: 154 LBS | BODY MASS INDEX: 27.29 KG/M2 | HEIGHT: 63 IN

## 2023-09-11 DIAGNOSIS — Z96.642 HISTORY OF TOTAL HIP ARTHROPLASTY, LEFT: Primary | ICD-10-CM

## 2023-09-11 PROCEDURE — 99024 POSTOP FOLLOW-UP VISIT: CPT | Performed by: ORTHOPAEDIC SURGERY

## 2023-09-11 RX ORDER — HYDROCODONE BITARTRATE AND ACETAMINOPHEN 5; 325 MG/1; MG/1
1 TABLET ORAL EVERY 6 HOURS PRN
Qty: 28 TABLET | Refills: 0 | Status: SHIPPED | OUTPATIENT
Start: 2023-09-11 | End: 2023-09-18

## 2023-09-11 NOTE — PROGRESS NOTES
[address] [facility]  Office Visit    Chief Complaint: Follow-up s/p left total hip arthroplasty    HPI:  Stephanie London is a 78 y.o. who is here in follow-up of left total hip arthroplasty performed on August 1, 2023. She walks without assistive device. She still has pain at night and when she does physical therapy. She remains active in physical therapy. She rates pain as 5/10. Feels longer on the left leg but is also undergoing treatment for pes planus on the right side. She has recently seen Dr. Estiven Maharaj for this and is being treated with a brace and this has helped so far. She is taking meloxicam to help with pain. Oxycodone has not been helpful. 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    CLL (chronic lymphocytic leukemia) ([facility])    Opioid dependence with current use ([facility])    Primary osteoarthritis of left hip       ROS:  Constitutional: denies fever, chills, weight loss  MSK: denies pain in other joints, muscle aches  Neurological: denies numbness, tingling, weakness    Exam:  Appearance: sitting in exam room chair, appears to be in no acute distress, awake and alert  Resp: unlabored breathing on room air  Skin: warm, dry and intact with out erythema or significant increased temperature  Neuro: grossly intact both lower extremities. Intact sensation to light touch. Motor exam 4+ to 5/5 in all major motor groups. Left hip: Examination demonstrates negative logroll and negative Stinchfield. There is brisk capillary refill. Strength is 5/5 in hamstrings, quads, hip flexors. Imaging:  None    Assessment:  S/p left total hip arthroplasty    Plan:  She is recovering well postoperatively. She will continue working with physical therapy.   [place] [facility]ons was prescribed today and she will wean off

## 2023-09-26 ENCOUNTER — PATIENT MESSAGE (OUTPATIENT)
Dept: ORTHOPEDIC SURGERY | Age: 68
End: 2023-09-26

## 2023-09-26 DIAGNOSIS — Z96.642 HISTORY OF TOTAL HIP ARTHROPLASTY, LEFT: ICD-10-CM

## 2023-09-26 RX ORDER — HYDROCODONE BITARTRATE AND ACETAMINOPHEN 5; 325 MG/1; MG/1
1 TABLET ORAL EVERY 6 HOURS PRN
Qty: 28 TABLET | Refills: 0 | Status: SHIPPED | OUTPATIENT
Start: 2023-09-26 | End: 2023-10-03

## 2023-09-26 NOTE — TELEPHONE ENCOUNTER
From: Monique Flores  To: Dr. Wilkes Plant: 9/26/2023 1:34 PM EDT  Subject: refill for hydrocodone 5-325    I would like a refill for the above mentioned medication.  [place] in [place] [phone]    Thank you  Monique Flores

## 2023-10-11 ENCOUNTER — HOSPITAL ENCOUNTER (OUTPATIENT)
Dept: GENERAL RADIOLOGY | Age: 68
Discharge: HOME | End: 2023-10-11
Attending: INTERNAL MEDICINE
Payer: MEDICARE

## 2023-10-11 ENCOUNTER — HOSPITAL ENCOUNTER (OUTPATIENT)
Age: 68
Discharge: HOME | End: 2023-10-11
Payer: MEDICARE

## 2023-10-11 DIAGNOSIS — J20.9 ACUTE BRONCHITIS, UNSPECIFIED ORGANISM: ICD-10-CM

## 2023-10-11 PROCEDURE — 71046 X-RAY EXAM CHEST 2 VIEWS: CPT

## 2023-10-26 ENCOUNTER — TELEPHONE (OUTPATIENT)
Dept: ORTHOPEDIC SURGERY | Age: 68
End: 2023-10-26

## 2023-10-26 NOTE — TELEPHONE ENCOUNTER
Called patient to reschedule her appointment Friday for another day. Voicemail box was not set up.     Upon return phone call., Leo reschedule for next week

## 2023-10-27 ENCOUNTER — OFFICE VISIT (OUTPATIENT)
Dept: ORTHOPEDIC SURGERY | Age: 68
End: 2023-10-27

## 2023-10-27 VITALS — WEIGHT: 145.06 LBS | HEIGHT: 63 IN | BODY MASS INDEX: 25.7 KG/M2

## 2023-10-27 DIAGNOSIS — M21.41 ACQUIRED PES PLANUS, RIGHT: ICD-10-CM

## 2023-10-27 DIAGNOSIS — M76.821 POSTERIOR TIBIAL TENDON DYSFUNCTION (PTTD) OF RIGHT LOWER EXTREMITY: Primary | ICD-10-CM

## 2023-10-28 NOTE — PROGRESS NOTES
CHIEF COMPLAINT:   1-Right posterior-medial ankle pain/posterior tibial tendenosis. 2-Right pes planus    HISTORY:  Ms. Matt Alas 80 y.o.  female presents today for f/u evaluation of right posterior-medial ankle pain which started to gradually worsen since she had her left total hip by Dr. Pasquale Ingram on 8/1/2023. Since then, she feels like her left leg is shorter than the right and is causing her right ankle pain to worsen. She is complaining of achy  pain. She rates her pain 1/10 VAS when walking. Pain is increase with standing and walking and shoe wear. Pain is sharp with first few steps, dull achy pain by the end of the day. The pain radiates to medial leg, and no numbness and tingling sensation. No other complaint. She is well-known to me for right lateral ankle pain, peroneal tendinosis in September 2021 and denies any lateral ankle pain today. She is a smoker.     Past Medical History:   Diagnosis Date    Arthritis     Cancer ([address]) 11/09/2021    CLL    Chronic back pain     Hyperlipidemia     Osteoarthritis     Prolonged emergence from general anesthesia     x 1       Past Surgical History:   Procedure Laterality Date    CERVIX SURGERY      HERNIA REPAIR      HYSTERECTOMY (CERVIX STATUS UNKNOWN)      SHOULDER ARTHROSCOPY Right 11/16/2021    RIGHT SHOULDER EXAM UNDER ANESTHESIA, DIAGNOSTIC ARTHROSCOPY, ARTHROSCOPIC EXTENSIVE DEBRIDEMENT, LYSIS OF ADHESIONS, ARTHROSCOPIC ROTATOR CUFF REPAIR WITH COLLAGEN PATCH AUGMENTATION, performed by Mary Zavala MD at [address] [place] [address] Left 8/1/2023    LEFT ANTERIOR TOTAL HIP REPLACEMENT WITH C-ARM performed by Tal Palacios MD at [facility] History     Socioeconomic History    Marital status: Single     Spouse name: Not on file    Number of children: Not on file    Years of education: Not on file    Highest education level: Not on file   Occupational History    Occupation: cook   Tobacco Use    Smoking status: Light Smoker     Types: Cigarettes

## 2023-11-03 ENCOUNTER — OFFICE VISIT (OUTPATIENT)
Dept: ORTHOPEDIC SURGERY | Age: 68
End: 2023-11-03

## 2023-11-03 VITALS — WEIGHT: 154 LBS | BODY MASS INDEX: 27.29 KG/M2 | HEIGHT: 63 IN

## 2023-11-03 DIAGNOSIS — Z96.642 HISTORY OF TOTAL HIP ARTHROPLASTY, LEFT: Primary | ICD-10-CM

## 2023-11-03 NOTE — PROGRESS NOTES
[address]  Office Visit    Chief Complaint: Follow-up s/p left total hip arthroplasty    HPI:  Karlos Krause is a 80 y.o. who is here in follow-up of left total hip arthroplasty performed on August 1, 2023. She walks without assistive device. She reports minimal pain in the left hip. She still feels longer on the left side but this is corrected with a heel lift on the right. She has numbness of the lateral left thigh that is improving as well. She takes Aleve as needed for achy pain when she does have it. She was unable to do a lot of physical therapy due to being sick, but she is now on the mend. 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    CLL (chronic lymphocytic leukemia) ([facility])    Opioid dependence with current use ([facility])    Primary osteoarthritis of left hip       ROS:  Constitutional: denies fever, chills, weight loss  MSK: denies pain in other joints, muscle aches  Neurological: denies numbness, tingling, weakness    Exam:  Appearance: sitting in exam room chair, appears to be in no acute distress, awake and alert  Resp: unlabored breathing on room air  Skin: warm, dry and intact with out erythema or significant increased temperature  Neuro: grossly intact both lower extremities. Intact sensation to light touch. Motor exam 4+ to 5/5 in all major motor groups. Left hip: Examination demonstrates negative logroll and negative Stinchfield. There is brisk capillary refill. Strength is 5/5 in hamstrings, quads, hip flexors. Imaging:  AP pelvis, AP and frog-leg lateral views of the left hip were performed and interpreted today. Significant for total hip arthroplasty prosthesis in place with no signs of osteolysis, loosening, fracture, dislocation.     Assessment:  S/p left

## 2023-11-10 PROBLEM — J44.9 CHRONIC OBSTRUCTIVE PULMONARY DISEASE, UNSPECIFIED (HCC): Status: ACTIVE | Noted: 2023-11-10

## 2023-11-14 ENCOUNTER — OFFICE VISIT (OUTPATIENT)
Dept: SLEEP MEDICINE | Age: 68
End: 2023-11-14
Payer: MEDICARE

## 2023-11-14 ENCOUNTER — OFFICE VISIT (OUTPATIENT)
Dept: PULMONOLOGY | Age: 68
End: 2023-11-14
Payer: MEDICARE

## 2023-11-14 VITALS
SYSTOLIC BLOOD PRESSURE: 100 MMHG | BODY MASS INDEX: 27.29 KG/M2 | TEMPERATURE: 97.4 F | OXYGEN SATURATION: 98 % | HEART RATE: 75 BPM | WEIGHT: 154 LBS | HEIGHT: 63 IN | DIASTOLIC BLOOD PRESSURE: 60 MMHG | RESPIRATION RATE: 18 BRPM

## 2023-11-14 VITALS
WEIGHT: 154.4 LBS | SYSTOLIC BLOOD PRESSURE: 100 MMHG | HEIGHT: 63 IN | BODY MASS INDEX: 27.36 KG/M2 | OXYGEN SATURATION: 98 % | RESPIRATION RATE: 18 BRPM | HEART RATE: 75 BPM | TEMPERATURE: 97.4 F | DIASTOLIC BLOOD PRESSURE: 60 MMHG

## 2023-11-14 DIAGNOSIS — G47.19 EXCESSIVE DAYTIME SLEEPINESS: ICD-10-CM

## 2023-11-14 DIAGNOSIS — G47.33 OVERLAP SYNDROME OF OBSTRUCTIVE SLEEP APNEA AND CHRONIC OBSTRUCTIVE PULMONARY DISEASE (HCC): ICD-10-CM

## 2023-11-14 DIAGNOSIS — J44.9 OVERLAP SYNDROME OF OBSTRUCTIVE SLEEP APNEA AND CHRONIC OBSTRUCTIVE PULMONARY DISEASE (HCC): ICD-10-CM

## 2023-11-14 DIAGNOSIS — R06.2 WHEEZING: ICD-10-CM

## 2023-11-14 DIAGNOSIS — R06.83 SNORING: ICD-10-CM

## 2023-11-14 DIAGNOSIS — J96.11 CHRONIC RESPIRATORY FAILURE WITH HYPOXIA (HCC): ICD-10-CM

## 2023-11-14 DIAGNOSIS — J44.9 CHRONIC OBSTRUCTIVE PULMONARY DISEASE, UNSPECIFIED COPD TYPE (HCC): Primary | ICD-10-CM

## 2023-11-14 DIAGNOSIS — G47.33 OSA (OBSTRUCTIVE SLEEP APNEA): Primary | ICD-10-CM

## 2023-11-14 PROCEDURE — 1090F PRES/ABSN URINE INCON ASSESS: CPT | Performed by: INTERNAL MEDICINE

## 2023-11-14 PROCEDURE — 4004F PT TOBACCO SCREEN RCVD TLK: CPT | Performed by: PSYCHIATRY & NEUROLOGY

## 2023-11-14 PROCEDURE — G8427 DOCREV CUR MEDS BY ELIG CLIN: HCPCS | Performed by: PSYCHIATRY & NEUROLOGY

## 2023-11-14 PROCEDURE — 3017F COLORECTAL CA SCREEN DOC REV: CPT | Performed by: INTERNAL MEDICINE

## 2023-11-14 PROCEDURE — 3023F SPIROM DOC REV: CPT | Performed by: PSYCHIATRY & NEUROLOGY

## 2023-11-14 PROCEDURE — 1123F ACP DISCUSS/DSCN MKR DOCD: CPT | Performed by: PSYCHIATRY & NEUROLOGY

## 2023-11-14 PROCEDURE — 3023F SPIROM DOC REV: CPT | Performed by: INTERNAL MEDICINE

## 2023-11-14 PROCEDURE — G8399 PT W/DXA RESULTS DOCUMENT: HCPCS | Performed by: INTERNAL MEDICINE

## 2023-11-14 PROCEDURE — G8399 PT W/DXA RESULTS DOCUMENT: HCPCS | Performed by: PSYCHIATRY & NEUROLOGY

## 2023-11-14 PROCEDURE — G8417 CALC BMI ABV UP PARAM F/U: HCPCS | Performed by: INTERNAL MEDICINE

## 2023-11-14 PROCEDURE — 99204 OFFICE O/P NEW MOD 45 MIN: CPT | Performed by: PSYCHIATRY & NEUROLOGY

## 2023-11-14 PROCEDURE — G8417 CALC BMI ABV UP PARAM F/U: HCPCS | Performed by: PSYCHIATRY & NEUROLOGY

## 2023-11-14 PROCEDURE — G8484 FLU IMMUNIZE NO ADMIN: HCPCS | Performed by: INTERNAL MEDICINE

## 2023-11-14 PROCEDURE — 1090F PRES/ABSN URINE INCON ASSESS: CPT | Performed by: PSYCHIATRY & NEUROLOGY

## 2023-11-14 PROCEDURE — 1036F TOBACCO NON-USER: CPT | Performed by: INTERNAL MEDICINE

## 2023-11-14 PROCEDURE — G8484 FLU IMMUNIZE NO ADMIN: HCPCS | Performed by: PSYCHIATRY & NEUROLOGY

## 2023-11-14 PROCEDURE — 99204 OFFICE O/P NEW MOD 45 MIN: CPT | Performed by: INTERNAL MEDICINE

## 2023-11-14 PROCEDURE — G8427 DOCREV CUR MEDS BY ELIG CLIN: HCPCS | Performed by: INTERNAL MEDICINE

## 2023-11-14 PROCEDURE — 3017F COLORECTAL CA SCREEN DOC REV: CPT | Performed by: PSYCHIATRY & NEUROLOGY

## 2023-11-14 PROCEDURE — 1123F ACP DISCUSS/DSCN MKR DOCD: CPT | Performed by: INTERNAL MEDICINE

## 2023-11-14 RX ORDER — FLUTICASONE FUROATE, UMECLIDINIUM BROMIDE AND VILANTEROL TRIFENATATE 100; 62.5; 25 UG/1; UG/1; UG/1
1 POWDER RESPIRATORY (INHALATION) DAILY
Qty: 1 EACH | Refills: 0 | Status: SHIPPED | COMMUNITY
Start: 2023-11-14

## 2023-11-14 ASSESSMENT — ENCOUNTER SYMPTOMS
EYES NEGATIVE: 1
COUGH: 1
GASTROINTESTINAL NEGATIVE: 1
ALLERGIC/IMMUNOLOGIC NEGATIVE: 1

## 2023-11-14 ASSESSMENT — SLEEP AND FATIGUE QUESTIONNAIRES
HOW LIKELY ARE YOU TO NOD OFF OR FALL ASLEEP WHILE LYING DOWN TO REST IN THE AFTERNOON WHEN CIRCUMSTANCES PERMIT: 2
HOW LIKELY ARE YOU TO NOD OFF OR FALL ASLEEP IN A CAR, WHILE STOPPED FOR A FEW MINUTES IN TRAFFIC: 0
HOW LIKELY ARE YOU TO NOD OFF OR FALL ASLEEP WHILE SITTING AND READING: 2
HOW LIKELY ARE YOU TO NOD OFF OR FALL ASLEEP WHILE WATCHING TV: 2
ESS TOTAL SCORE: 7
HOW LIKELY ARE YOU TO NOD OFF OR FALL ASLEEP WHILE SITTING INACTIVE IN A PUBLIC PLACE: 0
HOW LIKELY ARE YOU TO NOD OFF OR FALL ASLEEP WHILE SITTING QUIETLY AFTER LUNCH WITHOUT ALCOHOL: 1
HOW LIKELY ARE YOU TO NOD OFF OR FALL ASLEEP WHILE SITTING AND TALKING TO SOMEONE: 0
HOW LIKELY ARE YOU TO NOD OFF OR FALL ASLEEP WHEN YOU ARE A PASSENGER IN A CAR FOR AN HOUR WITHOUT A BREAK: 0
NECK CIRCUMFERENCE (INCHES): 13.5

## 2023-11-14 NOTE — PROGRESS NOTES
a  follow-up gallbladder ultrasound. 6. Colonic diverticulosis. CTPA: No results found for this or any previous visit. CXR PA/LAT: Results for orders placed during the hospital encounter of 10/11/23    XR CHEST (2 VW)    Narrative  EXAMINATION:  TWO XRAY VIEWS OF THE CHEST    10/11/2023 4:14 pm    COMPARISON:  None. HISTORY:  ORDERING SYSTEM PROVIDED HISTORY: Acute bronchitis, unspecified organism  TECHNOLOGIST PROVIDED HISTORY:  Reason for Exam: cough    FINDINGS:  The lungs appear clear. The heart and mediastinal structures are  unremarkable. Bony thorax appears normal. Visualized upper abdomen is  unremarkable. Impression  No abnormalities demonstrated. Pulmonary function testing  None on file        This note was transcribed using [facility]. Please disregard any translational errors.     Dominique Mejia MD  [facility] Pulmonary, Sleep and Critical Care

## 2023-11-14 NOTE — PATIENT INSTRUCTIONS
Orders Placed This Encounter   Procedures    Sleep Study with PAP Titration     Standing Status:   Future     Standing Expiration Date:   11/14/2024     Order Specific Question:   Sleep Study Titration Type     Answer:   CPAP     Order Specific Question:   Location For Sleep Study     Answer:   [place]     Order Specific Question:   Select Sleep Lab Location     Answer:   [facility]    Baseline Diagnostic Sleep Study     Standing Status:   Future     Standing Expiration Date:   11/14/2024     Order Specific Question:   Adult or Pediatric     Answer:   Adult Study (>7 Years)     Order Specific Question:   Location For Sleep Study     Answer:   [place]     Order Specific Question:   Select Sleep Lab Location     Answer:   [facility]

## 2023-11-14 NOTE — PROGRESS NOTES
MD SILVIA Kendrick Board Certified in Sleep Medicine  Certified [facility] Sleep Medicine  Board Certified in Neurology [facility]  [address] [facility] [place] [address] [facility] [place]  [address] [address]           [address]  [address] [facility] [place] [facility]  [address] [address]  [phone]    Subjective:     Patient ID: Keagan Castillo is a 80 y.o. female. Chief Complaint   Patient presents with    Establish Care    Other     Nocturnal hypoxia       HPI:        Keagan Castillo is a 80 y.o. female referred by Dr. Eder Thomason for a sleep evaluation. She complains of snoring, tossing and turning but she denies snorting, choking, periods of not breathing, knees buckling with laughing, completely or partially paralyzed while falling asleep or waking up, take naps during the day, noisy environment, uncomfortable room temperature, uncomfortable bedding. Symptoms began several years ago, unchanged since that time. The patient's family confirmed the snoring without the stopped breathing at night. SLEEP SCHEDULE: Goes to bed around 9 PM in the weekdays and 9 PM in the weekends. It usually takes the patient 2-3 minutes to fall asleep. The patient gets up 2-3 per night to go to the bathroom. The Patient finally gets up at 4 AM during the weekdays and 4 AM in the weekends. patient wakes up with dry mouth and sometimes morning headache. . the headache usually dull headache. The patient has restless sleep with frequent arousals in addition to the Patient has significant daytime sleepiness. The Patient scored Willard Sleepiness Score: 7 on Willard Sleepiness Scale ( more than 10 is indicative of daytime sleepiness)and 18 in fatigue scale ( more than 36 is indicative of daytime fatigue). The patient takes no naps.     Previous evaluation and treatment has

## 2023-11-16 ENCOUNTER — TELEPHONE (OUTPATIENT)
Dept: PULMONOLOGY | Age: 68
End: 2023-11-16

## 2023-11-16 NOTE — TELEPHONE ENCOUNTER
Sumi Gaona with [employer] calls in regarding the chart notes that we sent. Chart notes say that her oxygen level went up to 94% on 2L of oxygen but does not say \"with exertion\". If it's not \"with exertion\" she doesn't qualify for a POC  If it is with exertion, they need an addendum faxed to them with the doctor's signature.   Fax # [phone]

## 2023-11-21 ENCOUNTER — HOSPITAL ENCOUNTER (OUTPATIENT)
Dept: PULMONOLOGY | Age: 68
Discharge: HOME | End: 2023-11-21
Attending: INTERNAL MEDICINE
Payer: MEDICARE

## 2023-11-21 ENCOUNTER — HOSPITAL ENCOUNTER (OUTPATIENT)
Age: 68
Discharge: HOME | End: 2023-11-21
Attending: INTERNAL MEDICINE
Payer: MEDICARE

## 2023-11-21 DIAGNOSIS — E78.00 HIGH CHOLESTEROL: ICD-10-CM

## 2023-11-21 DIAGNOSIS — R73.9 HYPERGLYCEMIA: ICD-10-CM

## 2023-11-21 LAB
ALBUMIN SERPL-MCNC: 4.2 G/DL (ref 3.4–5)
ALBUMIN/GLOB SERPL: 1.5 {RATIO} (ref 1.1–2.2)
ALP SERPL-CCNC: 110 U/L (ref 40–129)
ALT SERPL-CCNC: 9 U/L (ref 10–40)
ANION GAP SERPL CALCULATED.3IONS-SCNC: 9 MMOL/L (ref 3–16)
AST SERPL-CCNC: 16 U/L (ref 15–37)
BASOPHILS # BLD: 0 K/UL (ref 0–0.2)
BASOPHILS NFR BLD: 0 %
BILIRUB SERPL-MCNC: <0.2 MG/DL (ref 0–1)
BUN SERPL-MCNC: 23 MG/DL (ref 7–20)
CALCIUM SERPL-MCNC: 9.6 MG/DL (ref 8.3–10.6)
CHLORIDE SERPL-SCNC: 102 MMOL/L (ref 99–110)
CHOLEST SERPL-MCNC: 255 MG/DL (ref 0–199)
CO2 SERPL-SCNC: 25 MMOL/L (ref 21–32)
CREAT SERPL-MCNC: 1 MG/DL (ref 0.6–1.2)
DEPRECATED RDW RBC AUTO: 14 % (ref 12.4–15.4)
DLCO %PRED: 117 %
DLCO PRED: NORMAL
DLCO/VA %PRED: NORMAL
DLCO/VA PRED: NORMAL
DLCO/VA: NORMAL
DLCO: NORMAL
EOSINOPHIL # BLD: 0.3 K/UL (ref 0–0.6)
EOSINOPHIL NFR BLD: 1 %
ERYTHROCYTE [SEDIMENTATION RATE] IN BLOOD BY WESTERGREN METHOD: 30 MM/HR (ref 0–30)
EST. AVERAGE GLUCOSE BLD GHB EST-MCNC: 137 MG/DL
EXPIRATORY TIME-POST: NORMAL
EXPIRATORY TIME: NORMAL
FEF 25-75% %CHNG: NORMAL
FEF 25-75% %PRED-POST: NORMAL
FEF 25-75% %PRED-PRE: NORMAL
FEF 25-75% PRED: NORMAL
FEF 25-75%-POST: NORMAL
FEF 25-75%-PRE: NORMAL
FEV1 %PRED-POST: 124 %
FEV1 %PRED-PRE: 125 %
FEV1 PRED: NORMAL
FEV1-POST: NORMAL
FEV1-PRE: NORMAL
FEV1/FVC %PRED-POST: NORMAL
FEV1/FVC %PRED-PRE: NORMAL
FEV1/FVC PRED: NORMAL
FEV1/FVC-POST: NORMAL
FEV1/FVC-PRE: NORMAL
FOLATE SERPL-MCNC: 11.25 NG/ML (ref 4.78–24.2)
FVC %PRED-POST: 110 L
FVC %PRED-PRE: 115 %
FVC PRED: NORMAL
FVC-POST: NORMAL
FVC-PRE: NORMAL
GAW %PRED: NORMAL
GAW PRED: NORMAL
GAW: NORMAL
GFR SERPLBLD CREATININE-BSD FMLA CKD-EPI: >60 ML/MIN/{1.73_M2}
GLUCOSE SERPL-MCNC: 104 MG/DL (ref 70–99)
HBA1C MFR BLD: 6.4 %
HCT VFR BLD AUTO: 38.8 % (ref 36–48)
HDLC SERPL-MCNC: 42 MG/DL (ref 40–60)
HGB BLD-MCNC: 12.9 G/DL (ref 12–16)
IC %PRED: NORMAL
IC PRED: NORMAL
IC: NORMAL
LDLC SERPL CALC-MCNC: ABNORMAL MG/DL
LDLC SERPL-MCNC: 169 MG/DL
LYMPHOCYTES # BLD: 23.6 K/UL (ref 1–5.1)
LYMPHOCYTES NFR BLD: 79 %
MCH RBC QN AUTO: 31.5 PG (ref 26–34)
MCHC RBC AUTO-ENTMCNC: 33.2 G/DL (ref 31–36)
MCV RBC AUTO: 94.8 FL (ref 80–100)
MEP: NORMAL
MIP: NORMAL
MONOCYTES # BLD: 0.3 K/UL (ref 0–1.3)
MONOCYTES NFR BLD: 1 %
MVV %PRED-PRE: NORMAL
MVV PRED: NORMAL
MVV-PRE: NORMAL
NEUTROPHILS # BLD: 5.7 K/UL (ref 1.7–7.7)
NEUTROPHILS NFR BLD: 19 %
PATH INTERP BLD-IMP: NO
PEF %PRED-POST: NORMAL
PEF %PRED-PRE: NORMAL
PEF PRED: NORMAL
PEF%CHNG: NORMAL
PEF-POST: NORMAL
PEF-PRE: NORMAL
PLATELET # BLD AUTO: 323 K/UL (ref 135–450)
PMV BLD AUTO: 7.7 FL (ref 5–10.5)
POTASSIUM SERPL-SCNC: 4.2 MMOL/L (ref 3.5–5.1)
PROT SERPL-MCNC: 7 G/DL (ref 6.4–8.2)
RAW %PRED: NORMAL
RAW PRED: NORMAL
RAW: NORMAL
RBC # BLD AUTO: 4.09 M/UL (ref 4–5.2)
RBC MORPH BLD: NORMAL
RV %PRED: NORMAL
RV PRED: NORMAL
RV: NORMAL
SODIUM SERPL-SCNC: 136 MMOL/L (ref 136–145)
SVC %PRED: NORMAL
SVC PRED: NORMAL
SVC: NORMAL
TLC %PRED: 117 %
TLC PRED: NORMAL
TLC: NORMAL
TRIGL SERPL-MCNC: 323 MG/DL (ref 0–150)
TSH SERPL DL<=0.005 MIU/L-ACNC: 1.56 UIU/ML (ref 0.27–4.2)
VA %PRED: NORMAL
VA PRED: NORMAL
VA: NORMAL
VIT B12 SERPL-MCNC: 484 PG/ML (ref 211–911)
VLDLC SERPL CALC-MCNC: ABNORMAL MG/DL
VTG %PRED: NORMAL
VTG PRED: NORMAL
VTG: NORMAL
WBC # BLD AUTO: 29.9 K/UL (ref 4–11)

## 2023-11-21 PROCEDURE — 94726 PLETHYSMOGRAPHY LUNG VOLUMES: CPT | Performed by: INTERNAL MEDICINE

## 2023-11-21 PROCEDURE — 85652 RBC SED RATE AUTOMATED: CPT

## 2023-11-21 PROCEDURE — 80061 LIPID PANEL: CPT

## 2023-11-21 PROCEDURE — 83036 HEMOGLOBIN GLYCOSYLATED A1C: CPT

## 2023-11-21 PROCEDURE — 36415 COLL VENOUS BLD VENIPUNCTURE: CPT

## 2023-11-21 PROCEDURE — 94060 EVALUATION OF WHEEZING: CPT

## 2023-11-21 PROCEDURE — 94729 DIFFUSING CAPACITY: CPT

## 2023-11-21 PROCEDURE — 94640 AIRWAY INHALATION TREATMENT: CPT

## 2023-11-21 PROCEDURE — 94060 EVALUATION OF WHEEZING: CPT | Performed by: INTERNAL MEDICINE

## 2023-11-21 PROCEDURE — 84443 ASSAY THYROID STIM HORMONE: CPT

## 2023-11-21 PROCEDURE — 94726 PLETHYSMOGRAPHY LUNG VOLUMES: CPT

## 2023-11-21 PROCEDURE — 6370000000 HC RX 637 (ALT 250 FOR IP): Performed by: INTERNAL MEDICINE

## 2023-11-21 PROCEDURE — 94729 DIFFUSING CAPACITY: CPT | Performed by: INTERNAL MEDICINE

## 2023-11-21 PROCEDURE — 82746 ASSAY OF FOLIC ACID SERUM: CPT

## 2023-11-21 PROCEDURE — 85025 COMPLETE CBC W/AUTO DIFF WBC: CPT

## 2023-11-21 PROCEDURE — 82607 VITAMIN B-12: CPT

## 2023-11-21 PROCEDURE — 80053 COMPREHEN METABOLIC PANEL: CPT

## 2023-11-21 RX ORDER — ALBUTEROL SULFATE 90 UG/1
4 AEROSOL, METERED RESPIRATORY (INHALATION) ONCE
Status: COMPLETED | OUTPATIENT
Start: 2023-11-21 | End: 2023-11-21

## 2023-11-21 RX ADMIN — ALBUTEROL SULFATE 4 PUFF: 90 AEROSOL, METERED RESPIRATORY (INHALATION) at 10:37

## 2023-11-21 ASSESSMENT — PULMONARY FUNCTION TESTS
FVC_PERCENT_PREDICTED_POST: 110
FVC_PERCENT_PREDICTED_PRE: 115
FEV1_PERCENT_PREDICTED_POST: 124
FEV1_PERCENT_PREDICTED_PRE: 125

## 2023-11-21 NOTE — PROCEDURES
Spirometry was acceptable and reproducible by ATS standards    Spirometry  Spirometry is normal.    Bronchodilator  There is no response to bronchodilator demonstrated. [Increase in FEV1 and/or FVC => 12% of control and => 200 ml]    Lung Volumes  Lung volumes are normal.    Diffusing Capacity  Diffusing capacity is normal.      Overall Interpretation  PFT does not demonstrates obstruction with FEV1 of 125 %  FVC of 115%  without bronchodilator response. Normal lung volume without air trapping or hyperinflation Diffusion capacity is normal  This is consistent with normal PFT.       Pulmonary Function Testing Results:    FEV1 %Pred-Pre   Date Value Ref Range Status   11/21/2023 125 % Final     FEV1 %Pred-Post   Date Value Ref Range Status   11/21/2023 124 % Final     TLC %Pred   Date Value Ref Range Status   11/21/2023 117 % Final     DLCO %Pred   Date Value Ref Range Status   11/21/2023 117 % Final             Obstruction severity and Restriction Severity using FEV1 %  Gold Stage Severity per ERS/ATS FEV1 % per ERS/ATS   GOLD I:  FEV1>80% Mild COPD >70   GOLD II:  FEV1 50-79% Moderate 50-70   GOLD III:  FEV1 30-49% Severe 35-50   GOLD IV:  FEV1 <30 Very Severe  <35       DCLO:  Normal:  %  Mild:  65-70%  Moderate:  41-60%  Severe:  <40%      PFT data will be scanned into the procedure tab in epic under this encounter    Ben Lyons MD  [facility] Pulmonology

## 2023-12-05 ENCOUNTER — HOSPITAL ENCOUNTER (OUTPATIENT)
Dept: SLEEP CENTER | Age: 68
Discharge: HOME | End: 2023-12-05
Payer: MEDICARE

## 2023-12-05 DIAGNOSIS — G47.33 OSA (OBSTRUCTIVE SLEEP APNEA): ICD-10-CM

## 2023-12-05 PROCEDURE — 95810 POLYSOM 6/> YRS 4/> PARAM: CPT

## 2023-12-06 PROBLEM — R09.02 HYPOXEMIA: Status: ACTIVE | Noted: 2023-12-06

## 2023-12-06 PROBLEM — G47.33 OSA (OBSTRUCTIVE SLEEP APNEA): Status: ACTIVE | Noted: 2023-12-06

## 2023-12-06 PROCEDURE — 95810 POLYSOM 6/> YRS 4/> PARAM: CPT | Performed by: PSYCHIATRY & NEUROLOGY

## 2023-12-07 ENCOUNTER — TELEPHONE (OUTPATIENT)
Dept: PULMONOLOGY | Age: 68
End: 2023-12-07

## 2023-12-07 NOTE — TELEPHONE ENCOUNTER
PSG Sleep study showed mild SABRA (on a scale of mild, moderate and severe). AHI was 6.4  per hr. (Average times per hr breathing was obstructed). O2 Desaturations to 84% (lowest o2)   Dr Johnny Domingo: Follow up with the patient's sleep physician to discuss results  A trial of CPAP titration is recommended with supplemental oxygen per protocol if needed. Avoid sedatives, alcohol and caffeinated drinks at bedtime. Avoid driving while sleepy. Voicemail not set up.       Will send Harry's

## 2023-12-26 ENCOUNTER — HOSPITAL ENCOUNTER (OUTPATIENT)
Dept: SLEEP CENTER | Age: 68
Discharge: HOME | End: 2023-12-26
Payer: MEDICARE

## 2023-12-26 DIAGNOSIS — G47.33 OSA (OBSTRUCTIVE SLEEP APNEA): ICD-10-CM

## 2023-12-26 PROCEDURE — 95811 POLYSOM 6/>YRS CPAP 4/> PARM: CPT

## 2023-12-28 PROCEDURE — 95811 POLYSOM 6/>YRS CPAP 4/> PARM: CPT | Performed by: PSYCHIATRY & NEUROLOGY

## 2024-03-07 ENCOUNTER — OFFICE VISIT (OUTPATIENT)
Dept: PULMONOLOGY | Age: 69
End: 2024-03-07
Payer: MEDICARE

## 2024-03-07 VITALS
WEIGHT: 161 LBS | SYSTOLIC BLOOD PRESSURE: 110 MMHG | OXYGEN SATURATION: 95 % | RESPIRATION RATE: 18 BRPM | HEART RATE: 79 BPM | TEMPERATURE: 97.8 F | BODY MASS INDEX: 28.53 KG/M2 | DIASTOLIC BLOOD PRESSURE: 68 MMHG | HEIGHT: 63 IN

## 2024-03-07 DIAGNOSIS — G47.33 OSA (OBSTRUCTIVE SLEEP APNEA): ICD-10-CM

## 2024-03-07 DIAGNOSIS — J96.11 CHRONIC RESPIRATORY FAILURE WITH HYPOXIA (HCC): Primary | ICD-10-CM

## 2024-03-07 PROCEDURE — 1090F PRES/ABSN URINE INCON ASSESS: CPT | Performed by: INTERNAL MEDICINE

## 2024-03-07 PROCEDURE — G8427 DOCREV CUR MEDS BY ELIG CLIN: HCPCS | Performed by: INTERNAL MEDICINE

## 2024-03-07 PROCEDURE — 3017F COLORECTAL CA SCREEN DOC REV: CPT | Performed by: INTERNAL MEDICINE

## 2024-03-07 PROCEDURE — 99213 OFFICE O/P EST LOW 20 MIN: CPT | Performed by: INTERNAL MEDICINE

## 2024-03-07 PROCEDURE — G8399 PT W/DXA RESULTS DOCUMENT: HCPCS | Performed by: INTERNAL MEDICINE

## 2024-03-07 PROCEDURE — G8417 CALC BMI ABV UP PARAM F/U: HCPCS | Performed by: INTERNAL MEDICINE

## 2024-03-07 PROCEDURE — G8484 FLU IMMUNIZE NO ADMIN: HCPCS | Performed by: INTERNAL MEDICINE

## 2024-03-07 PROCEDURE — 1036F TOBACCO NON-USER: CPT | Performed by: INTERNAL MEDICINE

## 2024-03-07 PROCEDURE — 1123F ACP DISCUSS/DSCN MKR DOCD: CPT | Performed by: INTERNAL MEDICINE

## 2024-03-07 NOTE — PATIENT INSTRUCTIONS
4 months follow up   Continue cpap @ current settings  Continue oxygen with cpap machine  Use inhaler as needed.

## 2024-03-07 NOTE — PROGRESS NOTES
insight.    Current Outpatient Medications   Medication Sig Dispense Refill    albuterol sulfate HFA (PROVENTIL;VENTOLIN;PROAIR) 108 (90 Base) MCG/ACT inhaler INHALE 2 PUFFS INTO THE LUNGS FOUR TIMES DAILY AS NEEDED FOR WHEEZING 8.5 g 1    fluticasone-umeclidin-vilant (TRELEGY ELLIPTA) 100-62.5-25 MCG/ACT AEPB inhaler Inhale 1 puff into the lungs daily 1 each 0    budesonide-formoterol (SYMBICORT) 160-4.5 MCG/ACT AERO Inhale 2 puffs into the lungs 2 times daily 10.2 g 5    naproxen (NAPROSYN) 500 MG tablet TAKE 1 TABLET BY MOUTH TWICE DAILY WITH MEALS 60 tablet 0    ipratropium 0.5 mg-albuterol 2.5 mg (DUONEB) 0.5-2.5 (3) MG/3ML SOLN nebulizer solution Inhale 3 mLs into the lungs every 4 hours 360 mL 0    Icosapent Ethyl (VASCEPA) 1 g CAPS capsule Take 2 capsules by mouth 2 times daily 120 capsule 3    atorvastatin (LIPITOR) 40 MG tablet Take 1 tablet by mouth daily 90 tablet 2    aspirin 81 MG EC tablet Take 1 tablet by mouth 2 times daily 60 tablet 0     No current facility-administered medications for this visit.       Data Reviewed:   CBC and Renal reviewed  Last CBC  Lab Results   Component Value Date/Time    WBC 29.9 11/21/2023 11:24 AM    RBC 4.09 11/21/2023 11:24 AM    HGB 12.9 11/21/2023 11:24 AM    MCV 94.8 11/21/2023 11:24 AM     11/21/2023 11:24 AM     Last Renal  Lab Results   Component Value Date/Time     11/21/2023 11:24 AM    K 4.2 11/21/2023 11:24 AM     11/21/2023 11:24 AM    CO2 25 11/21/2023 11:24 AM    CO2 21 08/02/2023 04:38 AM    CO2 20 07/24/2023 11:40 AM    BUN 23 11/21/2023 11:24 AM    CREATININE 1.0 11/21/2023 11:24 AM    GLUCOSE 104 11/21/2023 11:24 AM    CALCIUM 9.6 11/21/2023 11:24 AM       Last ABG  POC Blood Gas: No results found for: \"POCPH\", \"POCPCO2\", \"POCPO2\", \"POCHCO3\", \"NBEA\", \"KHPY2WMR\"  No results for input(s): \"PH\", \"PCO2\", \"PO2\", \"HCO3\", \"BE\", \"O2SAT\" in the last 72 hours.      Radiology Review:  Pertinent images / reports were reviewed as a part of this

## 2024-03-26 ENCOUNTER — TELEPHONE (OUTPATIENT)
Dept: PULMONOLOGY | Age: 69
End: 2024-03-26

## 2024-03-26 NOTE — TELEPHONE ENCOUNTER
[facility] notes from 03/07/24 Please fax to [phone]

## 2024-04-26 ENCOUNTER — TELEPHONE (OUTPATIENT)
Dept: PULMONOLOGY | Age: 69
End: 2024-04-26

## 2024-04-26 NOTE — TELEPHONE ENCOUNTER
Enedelia called in and asked due to insurance the 3/7/2024 ov note need to be amended to say that patient is \"Using and Benefiting\" with oxygen. This need to be stated in the note.

## 2024-06-20 ENCOUNTER — TELEPHONE (OUTPATIENT)
Dept: PULMONOLOGY | Age: 69
End: 2024-06-20

## 2024-06-20 NOTE — TELEPHONE ENCOUNTER
[facility]    Needs OVN from 3-7-24 addendum   To state   \"Pt is using and benefiting from her CPAP machine\"    Fax to [phone]  Aware DR Randhawa out til 7-1-24

## 2024-07-01 NOTE — TELEPHONE ENCOUNTER
Patient has not gotten her supplies still. She is rinsing her filters out with tap water. [facility] said that they do not have an order. This has been going on since march. Can we get an order for supplies sent to [employer] fax [phone] marked urgent.

## 2024-07-08 ENCOUNTER — PATIENT MESSAGE (OUTPATIENT)
Dept: PULMONOLOGY | Age: 69
End: 2024-07-08

## 2024-07-09 NOTE — TELEPHONE ENCOUNTER
From: Ayesha Johnston  To: Dr. Hira Choe  Sent: 7/8/2024 7:39 PM EDT  Subject: My [employer] supplies,    MY SUPPLIES STILL HAVE NOT SHIPPED FROM [employer]. IT HAS BEEN A MONTH I HAVE BEEN WAITING ON SUPPLIES. THEY SAY THEY HAVE NOT RECEIVED THE FAX FROM YOU.    THANK YOU FOR YOUR ATTENTION TO THIS

## 2024-07-23 ENCOUNTER — OFFICE VISIT (OUTPATIENT)
Dept: PULMONOLOGY | Age: 69
End: 2024-07-23
Payer: MEDICARE

## 2024-07-23 VITALS
HEART RATE: 79 BPM | BODY MASS INDEX: 27.5 KG/M2 | WEIGHT: 155.2 LBS | TEMPERATURE: 97.9 F | OXYGEN SATURATION: 95 % | RESPIRATION RATE: 18 BRPM | HEIGHT: 63 IN | SYSTOLIC BLOOD PRESSURE: 112 MMHG | DIASTOLIC BLOOD PRESSURE: 66 MMHG

## 2024-07-23 DIAGNOSIS — G47.33 OSA (OBSTRUCTIVE SLEEP APNEA): ICD-10-CM

## 2024-07-23 DIAGNOSIS — J31.0 CHRONIC RHINITIS: Primary | ICD-10-CM

## 2024-07-23 DIAGNOSIS — J31.0 CHRONIC RHINITIS: ICD-10-CM

## 2024-07-23 DIAGNOSIS — J41.0 SIMPLE CHRONIC BRONCHITIS (HCC): ICD-10-CM

## 2024-07-23 PROCEDURE — 99214 OFFICE O/P EST MOD 30 MIN: CPT | Performed by: INTERNAL MEDICINE

## 2024-07-23 PROCEDURE — G8427 DOCREV CUR MEDS BY ELIG CLIN: HCPCS | Performed by: INTERNAL MEDICINE

## 2024-07-23 PROCEDURE — G8417 CALC BMI ABV UP PARAM F/U: HCPCS | Performed by: INTERNAL MEDICINE

## 2024-07-23 PROCEDURE — 3017F COLORECTAL CA SCREEN DOC REV: CPT | Performed by: INTERNAL MEDICINE

## 2024-07-23 PROCEDURE — 1090F PRES/ABSN URINE INCON ASSESS: CPT | Performed by: INTERNAL MEDICINE

## 2024-07-23 PROCEDURE — 1123F ACP DISCUSS/DSCN MKR DOCD: CPT | Performed by: INTERNAL MEDICINE

## 2024-07-23 PROCEDURE — G8399 PT W/DXA RESULTS DOCUMENT: HCPCS | Performed by: INTERNAL MEDICINE

## 2024-07-23 PROCEDURE — 3023F SPIROM DOC REV: CPT | Performed by: INTERNAL MEDICINE

## 2024-07-23 PROCEDURE — 1036F TOBACCO NON-USER: CPT | Performed by: INTERNAL MEDICINE

## 2024-07-23 RX ORDER — FLUTICASONE PROPIONATE 50 MCG
2 SPRAY, SUSPENSION (ML) NASAL DAILY
Qty: 16 G | Refills: 5 | Status: SHIPPED | OUTPATIENT
Start: 2024-07-23 | End: 2024-07-24

## 2024-07-23 RX ORDER — TIOTROPIUM BROMIDE INHALATION SPRAY 1.56 UG/1
2 SPRAY, METERED RESPIRATORY (INHALATION) DAILY
Qty: 1 EACH | Refills: 0 | Status: SHIPPED | COMMUNITY
Start: 2024-07-23

## 2024-07-24 RX ORDER — FLUTICASONE PROPIONATE 50 MCG
2 SPRAY, SUSPENSION (ML) NASAL DAILY
Qty: 48 G | Refills: 5 | Status: SHIPPED | OUTPATIENT
Start: 2024-07-24

## 2024-08-17 ENCOUNTER — PATIENT MESSAGE (OUTPATIENT)
Dept: PULMONOLOGY | Age: 69
End: 2024-08-17

## 2024-08-19 ENCOUNTER — TELEPHONE (OUTPATIENT)
Dept: PULMONOLOGY | Age: 69
End: 2024-08-19

## 2024-08-19 RX ORDER — TIOTROPIUM BROMIDE INHALATION SPRAY 1.56 UG/1
2 SPRAY, METERED RESPIRATORY (INHALATION) DAILY
Qty: 1 EACH | Refills: 5 | Status: SHIPPED | OUTPATIENT
Start: 2024-08-19

## 2024-09-12 DIAGNOSIS — J41.0 SIMPLE CHRONIC BRONCHITIS (HCC): Primary | ICD-10-CM

## 2024-10-22 ENCOUNTER — HOSPITAL ENCOUNTER (OUTPATIENT)
Dept: MRI IMAGING | Age: 69
Discharge: HOME | End: 2024-10-22
Attending: INTERNAL MEDICINE
Payer: MEDICARE

## 2024-10-22 DIAGNOSIS — M54.9 BACK PAIN, UNSPECIFIED BACK LOCATION, UNSPECIFIED BACK PAIN LATERALITY, UNSPECIFIED CHRONICITY: ICD-10-CM

## 2024-10-22 PROCEDURE — 72148 MRI LUMBAR SPINE W/O DYE: CPT

## 2024-11-01 ENCOUNTER — HOSPITAL ENCOUNTER (OUTPATIENT)
Dept: GENERAL RADIOLOGY | Age: 69
Discharge: HOME | End: 2024-11-01
Payer: MEDICARE

## 2024-11-01 ENCOUNTER — HOSPITAL ENCOUNTER (OUTPATIENT)
Age: 69
Discharge: HOME | End: 2024-11-01
Payer: MEDICARE

## 2024-11-01 DIAGNOSIS — M43.16 SPONDYLOLISTHESIS OF LUMBAR REGION: ICD-10-CM

## 2024-11-01 PROCEDURE — 72110 X-RAY EXAM L-2 SPINE 4/>VWS: CPT

## 2024-12-03 ENCOUNTER — HOSPITAL ENCOUNTER (OUTPATIENT)
Dept: PHYSICAL THERAPY | Age: 69
Setting detail: THERAPIES SERIES
Discharge: HOME | End: 2024-12-03
Payer: MEDICARE

## 2024-12-03 DIAGNOSIS — M54.50 BILATERAL LOW BACK PAIN, UNSPECIFIED CHRONICITY, UNSPECIFIED WHETHER SCIATICA PRESENT: Primary | ICD-10-CM

## 2024-12-03 PROCEDURE — 97110 THERAPEUTIC EXERCISES: CPT | Performed by: PHYSICAL THERAPIST

## 2024-12-03 PROCEDURE — 97112 NEUROMUSCULAR REEDUCATION: CPT | Performed by: PHYSICAL THERAPIST

## 2024-12-03 PROCEDURE — 97161 PT EVAL LOW COMPLEX 20 MIN: CPT | Performed by: PHYSICAL THERAPIST

## 2024-12-03 NOTE — PLAN OF CARE
[facility]- Outpatient Rehabilitation and Therapy [address] [place] [address] office: [phone] fax: [phone]     Physical Therapy Initial Evaluation Certification      Dear Ki Stephen, * ,    We had the pleasure of evaluating the following patient for physical therapy services at [facility].  A summary of our findings can be found in the initial assessment below.  This includes our plan of care.  If you have any questions or concerns regarding these findings, please do not hesitate to contact me at the office phone number listed above.  Thank you for the referral.     Physician Signature:_______________________________Date:__________________  By signing above (or electronic signature), therapist’s plan is approved by physician       Physical Therapy: TREATMENT/PROGRESS NOTE   Patient: Ayesha Johnston (69 y.o. female)   Examination Date: 2024   :  1955 MRN: [id number]   Visit #: 1   Insurance Allowable Auth Needed   Medicare []Yes    [x]No    Insurance: Payor: MEDICARE / Plan: MEDICARE PART A AND B / Product Type: *No Product type* /   Insurance ID: [id number] - (Medicare)  Secondary Insurance (if applicable): [facility]   Treatment Diagnosis:     ICD-10-CM    1. Bilateral low back pain, unspecified chronicity, unspecified whether sciatica present  M54.50          Medical Diagnosis:  Spondylolisthesis, lumbar region [M43.16]   Referring Physician: Ki Stephen,*  PCP: Savannah Louie MD     Plan of care signed (Y/N): sent on 12-3-24    Date of Patient follow up with Physician: DIANE     Plan of Care Report: EVAL today  POC update due: (10 visits /OR AUTH LIMITS, whichever is less)  2025                                             Medical History:  Comorbidities:  Cancer/Tumor  Osteoarthritis  Depression  COVID-19  Relevant Medical History: multiple see enclosed.                                          Precautions/

## 2024-12-10 ENCOUNTER — HOSPITAL ENCOUNTER (OUTPATIENT)
Dept: PHYSICAL THERAPY | Age: 69
Setting detail: THERAPIES SERIES
Discharge: HOME | End: 2024-12-10
Payer: MEDICARE

## 2024-12-10 PROCEDURE — 97112 NEUROMUSCULAR REEDUCATION: CPT | Performed by: PHYSICAL THERAPIST

## 2024-12-10 PROCEDURE — 97110 THERAPEUTIC EXERCISES: CPT | Performed by: PHYSICAL THERAPIST

## 2024-12-10 PROCEDURE — 97140 MANUAL THERAPY 1/> REGIONS: CPT | Performed by: PHYSICAL THERAPIST

## 2024-12-10 NOTE — FLOWSHEET NOTE
No      CHARGE CAPTURE     PT CHARGE GRID   CPT Code (TIMED) minutes # CPT Code (UNTIMED) #     Therex (87218)  15 1  EVAL:LOW (45843 - Typically 20 minutes face-to-face)     Neuromusc. Re-ed (49015) 15 1  Re-Eval (01419)     Manual (05570) 8 1  Estim Unattended (45965)     Ther. Act (25011)    Mech. Traction (66053)     Gait (95237)    Dry Needle 1-2 muscle (84398)     Aquatic Therex (25671)    Dry Needle 3+ muscle (60196)     Iontophoresis (25070)    VASO (13587)     Ultrasound (44928)    Group Therapy (26033)     Estim Attended (55340)    Canalith Repositioning (78496)     Physical Performance Test (12390)    Custom orthotic ()     Other:    Other:    Total Timed Code Tx Minutes 38 3       Total Treatment Minutes 42        Charge Justification:  (80315) THERAPEUTIC EXERCISE - Provided verbal/tactile cueing for activities related to strengthening, flexibility, endurance, ROM performed to prevent loss of range of motion, maintain or improve muscular strength or increase flexibility, following either an injury or surgery.   (51523) HOME EXERCISE PROGRAM - Reviewed/Progressed HEP activities related to strengthening, flexibility, endurance, ROM performed to prevent loss of range of motion, maintain or improve muscular strength or increase flexibility, following either an injury or surgery.  (21683) NEUROMUSCULAR RE-EDUCATION - Therapeutic procedure, 1 or more areas, each 15 minutes; neuromuscular reeducation of movement, balance, coordination, kinesthetic sense, posture, and/or proprioception for sitting and/or standing activities    GOALS     Patient stated goal: decrease pain  [] Progressing: [] Met: [] Not Met: [] Adjusted    Therapist goals for Patient:   Short Term Goals: To be achieved in: 2 weeks  1. Independent in HEP and progression per patient tolerance, in order to prevent re-injury.   [] Progressing: [] Met: [] Not Met: [] Adjusted  2. Patient will have a decrease in pain to <5/10 to facilitate

## 2024-12-12 ENCOUNTER — APPOINTMENT (OUTPATIENT)
Dept: PHYSICAL THERAPY | Age: 69
End: 2024-12-12
Payer: MEDICARE

## 2024-12-17 ENCOUNTER — HOSPITAL ENCOUNTER (OUTPATIENT)
Dept: PHYSICAL THERAPY | Age: 69
Setting detail: THERAPIES SERIES
Discharge: HOME | End: 2024-12-17
Payer: MEDICARE

## 2024-12-17 PROCEDURE — 97110 THERAPEUTIC EXERCISES: CPT | Performed by: PHYSICAL THERAPIST

## 2024-12-17 PROCEDURE — 97112 NEUROMUSCULAR REEDUCATION: CPT | Performed by: PHYSICAL THERAPIST

## 2024-12-17 PROCEDURE — 97140 MANUAL THERAPY 1/> REGIONS: CPT | Performed by: PHYSICAL THERAPIST

## 2024-12-17 NOTE — FLOWSHEET NOTE
[facility]- Outpatient Rehabilitation and Therapy [address] [place] [address] office: [phone] fax: [phone]         Physical Therapy: TREATMENT/PROGRESS NOTE   Patient: Ayesha Johnston (69 y.o. female)   Examination Date: 2024   :  1955 MRN: [id number]   Visit #: 3   Insurance Allowable Auth Needed   Medicare []Yes    [x]No    Insurance: Payor: MEDICARE / Plan: MEDICARE PART A AND B / Product Type: *No Product type* /   Insurance ID: [id number] - (Medicare)  Secondary Insurance (if applicable): [facility]   Treatment Diagnosis:   No diagnosis found.     Medical Diagnosis:  Spondylolisthesis, lumbar region [M43.16]   Referring Physician: Ki Stephen,*  PCP: Savannah Louie MD     Plan of care signed (Y/N): sent on 12-3-24    Date of Patient follow up with Physician: Corky with new MD- PMR.      Plan of Care Report: EVAL today  POC update due: (10 visits /OR AUTH LIMITS, whichever is less)  2025                                             Medical History:  Comorbidities:  Cancer/Tumor  Osteoarthritis  Depression  COVID-19  Relevant Medical History: multiple see enclosed.                                          Precautions/ Contra-indications:           Latex allergy:  NO  Pacemaker:    NO  Contraindications for Manipulation: None  Date of Surgery: n/a  Other:    Red Flags:  None    Suicide Screening:   The patient did not verbalize a primary behavioral concern, suicidal ideation, suicidal intent, or demonstrate suicidal behaviors.    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 Most of her pain is in right SIJ region.        Test used Initial score  12/3/24 2024   Pain Summary VAS 3 to 9 / 10    Functional questionnaire [place] Back Pain Disability Scale 40    Other:              Pain:  Pain

## 2024-12-19 ENCOUNTER — HOSPITAL ENCOUNTER (OUTPATIENT)
Dept: PHYSICAL THERAPY | Age: 69
Setting detail: THERAPIES SERIES
Discharge: HOME | End: 2024-12-19
Payer: MEDICARE

## 2024-12-19 PROCEDURE — 97112 NEUROMUSCULAR REEDUCATION: CPT | Performed by: PHYSICAL THERAPIST

## 2024-12-19 PROCEDURE — 97110 THERAPEUTIC EXERCISES: CPT | Performed by: PHYSICAL THERAPIST

## 2024-12-19 PROCEDURE — 97140 MANUAL THERAPY 1/> REGIONS: CPT | Performed by: PHYSICAL THERAPIST

## 2024-12-19 NOTE — FLOWSHEET NOTE
[facility]- Outpatient Rehabilitation and Therapy [address] [place] [address] office: [phone] fax: [phone]         Physical Therapy: TREATMENT/PROGRESS NOTE   Patient: Ayesha Johnston (69 y.o. female)   Examination Date: 2024   :  1955 MRN: [id number]   Visit #: 4   Insurance Allowable Auth Needed   Medicare []Yes    [x]No    Insurance: Payor: MEDICARE / Plan: MEDICARE PART A AND B / Product Type: *No Product type* /   Insurance ID: [id number] - (Medicare)  Secondary Insurance (if applicable): [facility]   Treatment Diagnosis:   No diagnosis found.     Medical Diagnosis:  Spondylolisthesis, lumbar region [M43.16]   Referring Physician: Ki Stephen,*  PCP: Savannah Louie MD     Plan of care signed (Y/N): sent on 12-3-24    Date of Patient follow up with Physician: Corky henson new MD- PMR.      Plan of Care Report: EVAL today  POC update due: (10 visits /OR AUTH LIMITS, whichever is less)  2025                                             Medical History:  Comorbidities:  Cancer/Tumor  Osteoarthritis  Depression  COVID-19  Relevant Medical History: multiple see enclosed.                                          Precautions/ Contra-indications:           Latex allergy:  NO  Pacemaker:    NO  Contraindications for Manipulation: None  Date of Surgery: n/a  Other:    Red Flags:  None    Suicide Screening:   The patient did not verbalize a primary behavioral concern, suicidal ideation, suicidal intent, or demonstrate suicidal behaviors.    Preferred Language for Healthcare:   [x] English       [] other:    SUBJECTIVE EXAMINATION     Patient stated complaint: The patient noted that she worked all day today; from 8:30 to 2:00. On her feet all day. As a result she is sore now.         Test used Initial score  12/3/24 2024   Pain Summary VAS 3 to 9 / 10    Functional questionnaire [place] Back Pain Disability Scale 40    Other:              Pain:  Pain location:

## 2025-03-06 PROBLEM — J96.11 CHRONIC RESPIRATORY FAILURE WITH HYPOXIA (HCC): Status: ACTIVE | Noted: 2025-03-06

## (undated) DEVICE — BLANKET WRM W40.2XL55.9IN IORT LO BODY + MISTRAL AIR

## (undated) DEVICE — 3M™ STERI-DRAPE™ U-DRAPE 1067 1067 5/BX 4BX/CS/CTN&#X20;: Brand: STERI-DRAPE™

## (undated) DEVICE — GLOVE ORTHO 8   MSG9480

## (undated) DEVICE — SUTURE STRATAFIX SPRL SZ 2 0 L14IN ABSRB UD MH L36MM 1 2 CIR SXMP2B401

## (undated) DEVICE — STOCKINETTE ORTH W9XL36IN COT 2 PLY HLLW FOR HANDLING LMB

## (undated) DEVICE — GARMENT,MEDLINE,DVT,INT,CALF,MED, GEN2: Brand: MEDLINE

## (undated) DEVICE — SURE SET-DOUBLE BASIN-LF: Brand: MEDLINE INDUSTRIES, INC.

## (undated) DEVICE — SYRINGE IRRIG 60ML SFT PLIABLE BLB EZ TO GRP 1 HND USE W/

## (undated) DEVICE — SUTURE FIBERWIRE SZ 2 W/ TAPERED NEEDLE BLUE L38IN NONABSORB BLU L26.5MM 1/2 CIRCLE AR7200

## (undated) DEVICE — CANNULA ARTHSCP L3CM ID8MM DBL DAM 1 PC MOLD LO PROF FLNG

## (undated) DEVICE — SUTURE PDS II SZ 1 L27IN ABSRB VLT CT-1 L36MM 1/2 CIR Z341H

## (undated) DEVICE — ELECTRODE PT RET AD L9FT HI MOIST COND ADH HYDRGEL CORDED

## (undated) DEVICE — MAT FLR W32XL58IN

## (undated) DEVICE — MERCY HEALTH WEST TURNOVER: Brand: MEDLINE INDUSTRIES, INC.

## (undated) DEVICE — TOWEL,STOP FLAG GOLD N-W: Brand: MEDLINE

## (undated) DEVICE — PAD,ABDOMINAL,5"X9",ST,LF,25/BX: Brand: MEDLINE INDUSTRIES, INC.

## (undated) DEVICE — UNDERGLOVE SURG SZ 8 BLU LTX FREE SYN POLYISOPRENE POLYMER

## (undated) DEVICE — BUR SHAVER 5 MMX13 CM 8 FLUT OVL FOR AGGRESSIVE BNE COOLCUT

## (undated) DEVICE — STRAP SFTY W5XL72IN 1 PC

## (undated) DEVICE — SUTURE MCRYL SZ 4-0 L27IN ABSRB UD L19MM PS-2 1/2 CIR PRIM Y426H

## (undated) DEVICE — SUTURE STRATAFIX SPRL SZ 3-0 L12IN ABSRB UD FS-1 L30X30CM SXMP2B410

## (undated) DEVICE — SPONGE GZ W4XL8IN COT WVN 12 PLY

## (undated) DEVICE — BANDAGE COBAN 4 IN COMPR W4INXL5YD FOAM COHESIVE QUIK STK SELF ADH SFT

## (undated) DEVICE — DRAPE 70X60IN SPLIT IMPERV ADHES STRIP

## (undated) DEVICE — C-ARM: Brand: UNBRANDED

## (undated) DEVICE — KIT SHLDR STBL MARCO FOR SPIDER LIMB POS

## (undated) DEVICE — INTENDED TO SUPPORT AND MAINTAIN THE POSITION OF AN ANESTHETIZED PATIENT DURING SURGERY: Brand: ERIN BEACH CHAIR FACE MASK

## (undated) DEVICE — GOWN,REINFRCE,POLY,ECLIPSE,SLV,3XLG: Brand: MEDLINE

## (undated) DEVICE — PROBE ABLAT XL 90DEG ASPIR BPLR RF 1 PC ELECTRD ERGO HNDL

## (undated) DEVICE — SOLUTION IV IRRIG LACTATED RINGERS 3000ML 2B7487

## (undated) DEVICE — PAD DRY FLOOR ABS 32X58IN GRN

## (undated) DEVICE — APPLICATOR MEDICATED 26 CC SOLUTION HI LT ORNG CHLORAPREP

## (undated) DEVICE — BIPOLAR SEALER 23-112-1 AQM 6.0: Brand: AQUAMANTYS ®

## (undated) DEVICE — TUBING PMP L6FT CONT WAVE EXTN

## (undated) DEVICE — KIT POS FOAM HANA TBL

## (undated) DEVICE — 3M™ STERI-DRAPE™ U-DRAPE 1015: Brand: STERI-DRAPE™

## (undated) DEVICE — TUBING, SUCTION, 1/4" X 12', STRAIGHT: Brand: MEDLINE

## (undated) DEVICE — NEEDLE SUT PASS FOR ROT CUF LABRAL REP MULTFI SCORPION

## (undated) DEVICE — SOLUTION PREP PAINT POV IOD FOR SKIN MUCOUS MEM

## (undated) DEVICE — ADHESIVE SKIN CLOSURE WND 8.661X1.5 IN 22 CM LIQUIBAND SECUR

## (undated) DEVICE — PUDDLEVAC FLOOR SUCTION DEVICE: Brand: PUDDLEVAC

## (undated) DEVICE — SUTURE VCRL SZ 3-0 L27IN ABSRB UD L26MM CT-2 1/2 CIR J232H

## (undated) DEVICE — GOWN,SIRUS,POLYRNF,BRTHSLV,XLN/XXL,18/CS: Brand: MEDLINE

## (undated) DEVICE — GOWN,SIRUS,POLYRNF,BRTHSLV,XL,30/CS: Brand: MEDLINE

## (undated) DEVICE — SUTURE ABSORBABLE MONOFILAMENT 1-0 OS8 14 IN STRATAFIX SPRL SXPD2B202

## (undated) DEVICE — HYPODERMIC SAFETY NEEDLE: Brand: MONOJECT

## (undated) DEVICE — BASIC SINGLE BASIN 1-LF: Brand: MEDLINE INDUSTRIES, INC.

## (undated) DEVICE — JEWISH HOSPITAL TURNOVER KIT: Brand: MEDLINE INDUSTRIES, INC.

## (undated) DEVICE — BLADE SHV L13CM DIA5MM EXCALIBUR AGG COOLCUT

## (undated) DEVICE — PACK,SHOULDER II,DRAPE: Brand: MEDLINE

## (undated) DEVICE — GLOVE SURG SZ 85 L12IN FNGR ORTHO 126MIL CRM LTX FREE

## (undated) DEVICE — RETRACTOR SURG WIDE FLAT LO PROF LTWT PHOTONGUIDE

## (undated) DEVICE — SYSTEM SKIN CLSR 22CM DERMBND PRINEO

## (undated) DEVICE — TUBING FLD MGMT Y DBL SPIK DUALWAVE

## (undated) DEVICE — GUARD PROTECTOR EYE ADLT ST

## (undated) DEVICE — GLOVE SURG SZ 8 L12IN FNGR THK79MIL GRN LTX FREE

## (undated) DEVICE — GLOVE SURG SZ 8 L12IN FNGR THK75MIL WHT LTX POLYMER BEAD

## (undated) DEVICE — 3M™ IOBAN™ 2 ANTIMICROBIAL INCISE DRAPE 6640EZ: Brand: IOBAN™ 2

## (undated) DEVICE — 1010 S-DRAPE TOWEL DRAPE 10/BX: Brand: STERI-DRAPE™

## (undated) DEVICE — SOLUTION IV 1000ML 0.9% SOD CHL FOR IRRIG PLAS CONT

## (undated) DEVICE — GLOVE ORANGE PI 8 1/2   MSG9085

## (undated) DEVICE — NEEDLE SPNL L3.5IN PNK HUB S STL REG WALL FIT STYL W/ QNCKE

## (undated) DEVICE — TUBING PMP L16FT MAIN DISP FOR AR-6400 AR-6475

## (undated) DEVICE — SURGICAL SET UP - SURE SET: Brand: MEDLINE INDUSTRIES, INC.

## (undated) DEVICE — PROTECTOR ULN NRV PUR FOAM HK LOOP STRP ANATOMICALLY

## (undated) DEVICE — COVER LT HNDL BLU PLAS

## (undated) DEVICE — 6619 2 PTNT ISO SYS INCISE AREA&LT;(&GT;&&LT;)&GT;P: Brand: STERI-DRAPE™ IOBAN™ 2

## (undated) DEVICE — TOTAL BASIC: Brand: MEDLINE INDUSTRIES, INC.

## (undated) DEVICE — PAD,NON-ADHERENT,3X8,STERILE,LF,1/PK: Brand: MEDLINE

## (undated) DEVICE — CANNULA ARTHSCP L7CM DIA7MM TRNSLUC THRD FLX W/ NO SQUIRT